# Patient Record
Sex: FEMALE | Race: WHITE | Employment: OTHER | URBAN - METROPOLITAN AREA
[De-identification: names, ages, dates, MRNs, and addresses within clinical notes are randomized per-mention and may not be internally consistent; named-entity substitution may affect disease eponyms.]

---

## 2017-04-07 ENCOUNTER — HOSPITAL ENCOUNTER (EMERGENCY)
Age: 67
Discharge: HOME | End: 2017-04-07
Attending: EMERGENCY MEDICINE
Payer: COMMERCIAL

## 2017-04-07 VITALS
TEMPERATURE: 97.2 F | RESPIRATION RATE: 20 BRPM | WEIGHT: 188 LBS | BODY MASS INDEX: 30.22 KG/M2 | HEART RATE: 92 BPM | HEIGHT: 66 IN | OXYGEN SATURATION: 95 %

## 2017-04-07 DIAGNOSIS — Z76.0 ENCOUNTER FOR MEDICATION REFILL: ICD-10-CM

## 2017-04-07 DIAGNOSIS — F41.1 ANXIETY STATE: Primary | ICD-10-CM

## 2017-04-07 PROCEDURE — 6370000000 HC RX 637 (ALT 250 FOR IP): Performed by: EMERGENCY MEDICINE

## 2017-04-07 PROCEDURE — 99283 EMERGENCY DEPT VISIT LOW MDM: CPT

## 2017-04-07 RX ORDER — LORAZEPAM 1 MG/1
1 TABLET ORAL ONCE
Status: COMPLETED | OUTPATIENT
Start: 2017-04-07 | End: 2017-04-07

## 2017-04-07 RX ORDER — PHENYTOIN SODIUM 100 MG/1
200 CAPSULE, EXTENDED RELEASE ORAL 2 TIMES DAILY
Qty: 60 CAPSULE | Refills: 0 | Status: SHIPPED | OUTPATIENT
Start: 2017-04-07 | End: 2017-08-16

## 2017-04-07 RX ADMIN — LORAZEPAM 1 MG: 1 TABLET ORAL at 20:01

## 2017-04-07 ASSESSMENT — ENCOUNTER SYMPTOMS
RHINORRHEA: 0
BACK PAIN: 0
VOMITING: 0
EYE REDNESS: 0
EYE PAIN: 0
ABDOMINAL PAIN: 0
COUGH: 0
SHORTNESS OF BREATH: 0
NAUSEA: 0

## 2017-04-19 ENCOUNTER — HOSPITAL ENCOUNTER (EMERGENCY)
Age: 67
Discharge: HOME | End: 2017-04-19
Attending: EMERGENCY MEDICINE
Payer: COMMERCIAL

## 2017-04-19 ENCOUNTER — APPOINTMENT (OUTPATIENT)
Dept: CT IMAGING | Age: 67
End: 2017-04-19
Payer: COMMERCIAL

## 2017-04-19 VITALS
HEART RATE: 85 BPM | OXYGEN SATURATION: 97 % | HEIGHT: 66 IN | SYSTOLIC BLOOD PRESSURE: 116 MMHG | RESPIRATION RATE: 18 BRPM | WEIGHT: 185 LBS | DIASTOLIC BLOOD PRESSURE: 65 MMHG | BODY MASS INDEX: 29.73 KG/M2 | TEMPERATURE: 97.7 F

## 2017-04-19 DIAGNOSIS — R19.7 DIARRHEA, UNSPECIFIED TYPE: Primary | ICD-10-CM

## 2017-04-19 LAB
ABSOLUTE EOS #: 0.1 K/UL (ref 0–0.4)
ABSOLUTE LYMPH #: 1.8 K/UL (ref 1–4.8)
ABSOLUTE MONO #: 0.7 K/UL (ref 0.1–1.3)
ALBUMIN SERPL-MCNC: 3.8 G/DL (ref 3.5–5.2)
ALBUMIN/GLOBULIN RATIO: ABNORMAL (ref 1–2.5)
ALP BLD-CCNC: 100 U/L (ref 35–104)
ALT SERPL-CCNC: 12 U/L (ref 5–33)
ANION GAP SERPL CALCULATED.3IONS-SCNC: 12 MMOL/L (ref 9–17)
AST SERPL-CCNC: 17 U/L
BASOPHILS # BLD: 0 % (ref 0–2)
BASOPHILS ABSOLUTE: 0 K/UL (ref 0–0.2)
BILIRUB SERPL-MCNC: 0.15 MG/DL (ref 0.3–1.2)
BUN BLDV-MCNC: 7 MG/DL (ref 8–23)
BUN/CREAT BLD: ABNORMAL (ref 9–20)
CALCIUM SERPL-MCNC: 8.5 MG/DL (ref 8.6–10.4)
CHLORIDE BLD-SCNC: 102 MMOL/L (ref 98–107)
CO2: 25 MMOL/L (ref 20–31)
CREAT SERPL-MCNC: 0.75 MG/DL (ref 0.5–0.9)
DIFFERENTIAL TYPE: ABNORMAL
EOSINOPHILS RELATIVE PERCENT: 2 % (ref 0–4)
GFR AFRICAN AMERICAN: >60 ML/MIN
GFR NON-AFRICAN AMERICAN: >60 ML/MIN
GFR SERPL CREATININE-BSD FRML MDRD: ABNORMAL ML/MIN/{1.73_M2}
GFR SERPL CREATININE-BSD FRML MDRD: ABNORMAL ML/MIN/{1.73_M2}
GLUCOSE BLD-MCNC: 95 MG/DL (ref 70–99)
HCT VFR BLD CALC: 37.4 % (ref 36–46)
HEMOGLOBIN: 12.2 G/DL (ref 12–16)
LACTIC ACID: 1.8 MMOL/L (ref 0.5–2.2)
LIPASE: 29 U/L (ref 13–60)
LYMPHOCYTES # BLD: 18 % (ref 24–44)
MCH RBC QN AUTO: 31.3 PG (ref 26–34)
MCHC RBC AUTO-ENTMCNC: 32.7 G/DL (ref 31–37)
MCV RBC AUTO: 95.7 FL (ref 80–100)
MONOCYTES # BLD: 7 % (ref 1–7)
PDW BLD-RTO: 14.6 % (ref 11.5–14.9)
PLATELET # BLD: 261 K/UL (ref 150–450)
PLATELET ESTIMATE: ABNORMAL
PMV BLD AUTO: 7.2 FL (ref 6–12)
POTASSIUM SERPL-SCNC: 4.1 MMOL/L (ref 3.7–5.3)
RBC # BLD: 3.91 M/UL (ref 4–5.2)
RBC # BLD: ABNORMAL 10*6/UL
SEG NEUTROPHILS: 73 % (ref 36–66)
SEGMENTED NEUTROPHILS ABSOLUTE COUNT: 6.9 K/UL (ref 1.3–9.1)
SODIUM BLD-SCNC: 139 MMOL/L (ref 135–144)
TOTAL PROTEIN: 7 G/DL (ref 6.4–8.3)
WBC # BLD: 9.5 K/UL (ref 3.5–11)
WBC # BLD: ABNORMAL 10*3/UL

## 2017-04-19 PROCEDURE — 99284 EMERGENCY DEPT VISIT MOD MDM: CPT

## 2017-04-19 PROCEDURE — 74177 CT ABD & PELVIS W/CONTRAST: CPT

## 2017-04-19 PROCEDURE — 6360000004 HC RX CONTRAST MEDICATION: Performed by: EMERGENCY MEDICINE

## 2017-04-19 PROCEDURE — 36415 COLL VENOUS BLD VENIPUNCTURE: CPT

## 2017-04-19 PROCEDURE — 80053 COMPREHEN METABOLIC PANEL: CPT

## 2017-04-19 PROCEDURE — 83690 ASSAY OF LIPASE: CPT

## 2017-04-19 PROCEDURE — 83605 ASSAY OF LACTIC ACID: CPT

## 2017-04-19 PROCEDURE — 2580000003 HC RX 258: Performed by: EMERGENCY MEDICINE

## 2017-04-19 PROCEDURE — 85025 COMPLETE CBC W/AUTO DIFF WBC: CPT

## 2017-04-19 RX ORDER — SODIUM CHLORIDE 0.9 % (FLUSH) 0.9 %
10 SYRINGE (ML) INJECTION PRN
Status: DISCONTINUED | OUTPATIENT
Start: 2017-04-19 | End: 2017-04-19 | Stop reason: HOSPADM

## 2017-04-19 RX ORDER — 0.9 % SODIUM CHLORIDE 0.9 %
100 INTRAVENOUS SOLUTION INTRAVENOUS ONCE
Status: COMPLETED | OUTPATIENT
Start: 2017-04-19 | End: 2017-04-19

## 2017-04-19 RX ADMIN — Medication 10 ML: at 19:15

## 2017-04-19 RX ADMIN — SODIUM CHLORIDE 100 ML: 9 INJECTION, SOLUTION INTRAVENOUS at 19:15

## 2017-04-19 RX ADMIN — IOVERSOL 130 ML: 741 INJECTION INTRA-ARTERIAL; INTRAVENOUS at 19:15

## 2017-04-19 ASSESSMENT — ENCOUNTER SYMPTOMS
EYE DISCHARGE: 0
RHINORRHEA: 0
TROUBLE SWALLOWING: 0
COUGH: 0
SORE THROAT: 0
BLOOD IN STOOL: 0
VOMITING: 0
SINUS PRESSURE: 0
BACK PAIN: 0
CONSTIPATION: 0
SHORTNESS OF BREATH: 0
COLOR CHANGE: 0
EYE REDNESS: 0
CHEST TIGHTNESS: 0
DIARRHEA: 1
NAUSEA: 0
WHEEZING: 0
FACIAL SWELLING: 0
ABDOMINAL PAIN: 1
EYE PAIN: 0

## 2017-04-19 ASSESSMENT — PAIN SCALES - GENERAL: PAINLEVEL_OUTOF10: 2

## 2017-04-26 ENCOUNTER — OFFICE VISIT (OUTPATIENT)
Dept: GASTROENTEROLOGY | Age: 67
End: 2017-04-26
Payer: COMMERCIAL

## 2017-04-26 VITALS
RESPIRATION RATE: 14 BRPM | DIASTOLIC BLOOD PRESSURE: 67 MMHG | TEMPERATURE: 97 F | BODY MASS INDEX: 28.45 KG/M2 | HEART RATE: 78 BPM | SYSTOLIC BLOOD PRESSURE: 127 MMHG | OXYGEN SATURATION: 97 % | WEIGHT: 177 LBS | HEIGHT: 66 IN

## 2017-04-26 DIAGNOSIS — K62.3 RECTAL PROLAPSE: ICD-10-CM

## 2017-04-26 DIAGNOSIS — R15.9 FECAL INCONTINENCE: Primary | ICD-10-CM

## 2017-04-26 PROCEDURE — 99204 OFFICE O/P NEW MOD 45 MIN: CPT | Performed by: INTERNAL MEDICINE

## 2017-04-26 RX ORDER — TRAMADOL HYDROCHLORIDE 50 MG/1
50 TABLET ORAL EVERY 6 HOURS PRN
COMMUNITY
End: 2017-08-16

## 2017-04-26 ASSESSMENT — ENCOUNTER SYMPTOMS
ALLERGIC/IMMUNOLOGIC NEGATIVE: 1
ABDOMINAL DISTENTION: 1
VOMITING: 0
NAUSEA: 0
ABDOMINAL PAIN: 1
BLOOD IN STOOL: 1
RECTAL PAIN: 0
EYES NEGATIVE: 1
ANAL BLEEDING: 1
CONSTIPATION: 0
DIARRHEA: 1
RESPIRATORY NEGATIVE: 1

## 2017-05-03 ENCOUNTER — HOSPITAL ENCOUNTER (OUTPATIENT)
Age: 67
Setting detail: OUTPATIENT SURGERY
Discharge: HOME | End: 2017-05-03
Attending: INTERNAL MEDICINE | Admitting: INTERNAL MEDICINE
Payer: COMMERCIAL

## 2017-05-03 VITALS
OXYGEN SATURATION: 98 % | DIASTOLIC BLOOD PRESSURE: 68 MMHG | TEMPERATURE: 97.5 F | BODY MASS INDEX: 28.45 KG/M2 | HEART RATE: 58 BPM | RESPIRATION RATE: 16 BRPM | WEIGHT: 177 LBS | SYSTOLIC BLOOD PRESSURE: 146 MMHG | HEIGHT: 66 IN

## 2017-05-03 PROCEDURE — 2580000003 HC RX 258: Performed by: INTERNAL MEDICINE

## 2017-05-03 PROCEDURE — 6360000002 HC RX W HCPCS: Performed by: INTERNAL MEDICINE

## 2017-05-03 PROCEDURE — 99152 MOD SED SAME PHYS/QHP 5/>YRS: CPT | Performed by: INTERNAL MEDICINE

## 2017-05-03 PROCEDURE — 99153 MOD SED SAME PHYS/QHP EA: CPT | Performed by: INTERNAL MEDICINE

## 2017-05-03 PROCEDURE — 7100000011 HC PHASE II RECOVERY - ADDTL 15 MIN: Performed by: INTERNAL MEDICINE

## 2017-05-03 PROCEDURE — 3609010400 HC COLONOSCOPY POLYPECTOMY HOT BIOPSY: Performed by: INTERNAL MEDICINE

## 2017-05-03 PROCEDURE — 7100000010 HC PHASE II RECOVERY - FIRST 15 MIN: Performed by: INTERNAL MEDICINE

## 2017-05-03 PROCEDURE — 88305 TISSUE EXAM BY PATHOLOGIST: CPT

## 2017-05-03 RX ORDER — SODIUM CHLORIDE, SODIUM LACTATE, POTASSIUM CHLORIDE, CALCIUM CHLORIDE 600; 310; 30; 20 MG/100ML; MG/100ML; MG/100ML; MG/100ML
INJECTION, SOLUTION INTRAVENOUS CONTINUOUS
Status: DISCONTINUED | OUTPATIENT
Start: 2017-05-03 | End: 2017-05-03 | Stop reason: HOSPADM

## 2017-05-03 RX ORDER — MIDAZOLAM HYDROCHLORIDE 1 MG/ML
INJECTION INTRAMUSCULAR; INTRAVENOUS PRN
Status: DISCONTINUED | OUTPATIENT
Start: 2017-05-03 | End: 2017-05-03 | Stop reason: HOSPADM

## 2017-05-03 RX ORDER — MEPERIDINE HYDROCHLORIDE 50 MG/ML
INJECTION INTRAMUSCULAR; INTRAVENOUS; SUBCUTANEOUS PRN
Status: DISCONTINUED | OUTPATIENT
Start: 2017-05-03 | End: 2017-05-03 | Stop reason: HOSPADM

## 2017-05-03 RX ADMIN — SODIUM CHLORIDE, POTASSIUM CHLORIDE, SODIUM LACTATE AND CALCIUM CHLORIDE: 600; 310; 30; 20 INJECTION, SOLUTION INTRAVENOUS at 08:26

## 2017-05-03 ASSESSMENT — PAIN SCALES - GENERAL: PAINLEVEL_OUTOF10: 0

## 2017-05-03 ASSESSMENT — PAIN - FUNCTIONAL ASSESSMENT: PAIN_FUNCTIONAL_ASSESSMENT: 0-10

## 2017-05-04 LAB — SURGICAL PATHOLOGY REPORT: NORMAL

## 2017-05-08 DIAGNOSIS — K62.3 RECTAL PROLAPSE: Primary | ICD-10-CM

## 2017-06-07 ENCOUNTER — HOSPITAL ENCOUNTER (OUTPATIENT)
Age: 67
Setting detail: SPECIMEN
Discharge: HOME | End: 2017-06-07
Payer: COMMERCIAL

## 2017-06-07 ENCOUNTER — HOSPITAL ENCOUNTER (EMERGENCY)
Age: 67
Discharge: HOME | End: 2017-06-07
Attending: EMERGENCY MEDICINE
Payer: COMMERCIAL

## 2017-06-07 VITALS
TEMPERATURE: 98 F | SYSTOLIC BLOOD PRESSURE: 118 MMHG | OXYGEN SATURATION: 98 % | RESPIRATION RATE: 18 BRPM | WEIGHT: 177 LBS | DIASTOLIC BLOOD PRESSURE: 78 MMHG | BODY MASS INDEX: 28.45 KG/M2 | HEART RATE: 70 BPM | HEIGHT: 66 IN

## 2017-06-07 DIAGNOSIS — K62.5 RECTAL BLEED: Primary | ICD-10-CM

## 2017-06-07 LAB
ABSOLUTE EOS #: 0.1 K/UL (ref 0–0.4)
ABSOLUTE LYMPH #: 1.4 K/UL (ref 1–4.8)
ABSOLUTE MONO #: 0.4 K/UL (ref 0.1–1.3)
BASOPHILS # BLD: 0 %
BASOPHILS ABSOLUTE: 0 K/UL (ref 0–0.2)
DIFFERENTIAL TYPE: NORMAL
EOSINOPHILS RELATIVE PERCENT: 2 %
HCT VFR BLD CALC: 39.8 % (ref 36–46)
HEMOGLOBIN: 12.7 G/DL (ref 12–16)
LYMPHOCYTES # BLD: 18 %
MCH RBC QN AUTO: 30.4 PG (ref 26–34)
MCHC RBC AUTO-ENTMCNC: 31.9 G/DL (ref 31–37)
MCV RBC AUTO: 95.4 FL (ref 80–100)
MONOCYTES # BLD: 6 %
PDW BLD-RTO: 13.9 % (ref 11.5–14.9)
PLATELET # BLD: 236 K/UL (ref 150–450)
PLATELET ESTIMATE: NORMAL
PMV BLD AUTO: 7.4 FL (ref 6–12)
RBC # BLD: 4.17 M/UL (ref 4–5.2)
RBC # BLD: NORMAL 10*6/UL
SEG NEUTROPHILS: 74 %
SEGMENTED NEUTROPHILS ABSOLUTE COUNT: 6 K/UL (ref 1.3–9.1)
WBC # BLD: 8 K/UL (ref 3.5–11)
WBC # BLD: NORMAL 10*3/UL

## 2017-06-07 PROCEDURE — 36415 COLL VENOUS BLD VENIPUNCTURE: CPT

## 2017-06-07 PROCEDURE — 99284 EMERGENCY DEPT VISIT MOD MDM: CPT

## 2017-06-07 PROCEDURE — 85025 COMPLETE CBC W/AUTO DIFF WBC: CPT

## 2017-06-07 ASSESSMENT — ENCOUNTER SYMPTOMS
EYE PAIN: 0
EYE REDNESS: 0
SHORTNESS OF BREATH: 0
NAUSEA: 0
ANAL BLEEDING: 1
COUGH: 0
VOMITING: 0
RHINORRHEA: 0
BACK PAIN: 0
ABDOMINAL PAIN: 0

## 2017-06-07 ASSESSMENT — PAIN SCALES - GENERAL: PAINLEVEL_OUTOF10: 8

## 2017-06-07 ASSESSMENT — PAIN DESCRIPTION - FREQUENCY: FREQUENCY: INTERMITTENT

## 2017-06-07 ASSESSMENT — PAIN DESCRIPTION - DESCRIPTORS: DESCRIPTORS: ACHING

## 2017-06-07 ASSESSMENT — PAIN DESCRIPTION - PAIN TYPE: TYPE: ACUTE PAIN

## 2017-06-07 ASSESSMENT — PAIN DESCRIPTION - LOCATION: LOCATION: ABDOMEN

## 2017-07-27 DIAGNOSIS — R15.9 FECAL INCONTINENCE: ICD-10-CM

## 2017-07-27 DIAGNOSIS — K62.3 RECTAL PROLAPSE: ICD-10-CM

## 2017-08-16 ENCOUNTER — OFFICE VISIT (OUTPATIENT)
Dept: GASTROENTEROLOGY | Age: 67
End: 2017-08-16
Payer: COMMERCIAL

## 2017-08-16 VITALS
HEART RATE: 78 BPM | WEIGHT: 182 LBS | BODY MASS INDEX: 29.25 KG/M2 | SYSTOLIC BLOOD PRESSURE: 144 MMHG | TEMPERATURE: 97.1 F | OXYGEN SATURATION: 93 % | DIASTOLIC BLOOD PRESSURE: 64 MMHG | RESPIRATION RATE: 14 BRPM | HEIGHT: 66 IN

## 2017-08-16 DIAGNOSIS — K63.5 HYPERPLASTIC POLYP OF INTESTINE: Primary | ICD-10-CM

## 2017-08-16 DIAGNOSIS — R15.9 INCONTINENCE OF FECES, UNSPECIFIED FECAL INCONTINENCE TYPE: ICD-10-CM

## 2017-08-16 DIAGNOSIS — K62.3 RECTAL PROLAPSE: ICD-10-CM

## 2017-08-16 PROCEDURE — 99214 OFFICE O/P EST MOD 30 MIN: CPT | Performed by: INTERNAL MEDICINE

## 2017-08-16 RX ORDER — LEVETIRACETAM 750 MG/1
750 TABLET ORAL 2 TIMES DAILY
COMMUNITY
End: 2021-07-07

## 2017-08-16 ASSESSMENT — ENCOUNTER SYMPTOMS
ABDOMINAL DISTENTION: 0
RESPIRATORY NEGATIVE: 1
BLOOD IN STOOL: 0
ABDOMINAL PAIN: 0
NAUSEA: 0
DIARRHEA: 0
VOMITING: 0
EYES NEGATIVE: 1
ANAL BLEEDING: 0
GASTROINTESTINAL NEGATIVE: 1
RECTAL PAIN: 0
CONSTIPATION: 0
ALLERGIC/IMMUNOLOGIC NEGATIVE: 1

## 2020-08-04 ENCOUNTER — HOSPITAL ENCOUNTER (EMERGENCY)
Age: 70
Discharge: HOME | End: 2020-08-04
Attending: EMERGENCY MEDICINE
Payer: COMMERCIAL

## 2020-08-04 VITALS
HEART RATE: 82 BPM | BODY MASS INDEX: 33.27 KG/M2 | HEIGHT: 66 IN | TEMPERATURE: 97.8 F | SYSTOLIC BLOOD PRESSURE: 172 MMHG | OXYGEN SATURATION: 98 % | RESPIRATION RATE: 18 BRPM | WEIGHT: 207 LBS | DIASTOLIC BLOOD PRESSURE: 102 MMHG

## 2020-08-04 PROCEDURE — 99283 EMERGENCY DEPT VISIT LOW MDM: CPT

## 2020-08-04 ASSESSMENT — ENCOUNTER SYMPTOMS
COUGH: 0
DIARRHEA: 0
ABDOMINAL PAIN: 0
VOMITING: 0
EYE PAIN: 0
SORE THROAT: 0
NAUSEA: 0
SHORTNESS OF BREATH: 0

## 2020-08-04 NOTE — ED PROVIDER NOTES
[facility]     Emergency Department     Faculty Attestation    I performed a history and physical examination of the patient and discussed management with the resident. I have reviewed and agree with the residents findings including all diagnostic interpretations, and treatment plans as written at the time of my review. Any areas of disagreement are noted on the chart. I was personally present for the key portions of any procedures. I have documented in the chart those procedures where I was not present during the key portions. For Physician Assistant/ Nurse Practitioner cases/documentation I have personally evaluated this patient and have completed at least one if not all key elements of the E/M (history, physical exam, and MDM). Additional findings are as noted. This patient was evaluated in the Emergency Department for symptoms described in the history of present illness. The patient was evaluated in the context of the global COVID-19 pandemic, which necessitated consideration that the patient might be at risk for infection with the SARS-CoV-2 virus that causes COVID-19. Institutional protocols and algorithms that pertain to the evaluation of patients at risk for COVID-19 are in a state of rapid change based on information released by regulatory bodies including the CDC and federal and state organizations. These policies and algorithms were followed during the patient's care in the ED. Primary Care Physician: ANDRE Rodriguez - CNP    History: This is a 68 y.o. female who presents to the Emergency Department with complaint of hypertension. Patient was at her neurologist appointment today when the office noted the patient have an elevated blood pressure. She has no history of previous hypertension. She denies any headache visual disturbances chest pain or shortness of breath.   Patient is asymptomatic with her elevated blood pressure. Physical:   height is 5' 6\" (1.676 m) and weight is 207 lb (93.9 kg). Her oral temperature is 97.8 °F (36.6 °C). Her blood pressure is 172/102 (abnormal) and her pulse is 82. Her respiration is 18 and oxygen saturation is 98%. Awake alert nontoxic-appearing no acute distress moving all extremities. Impression: Asymptomatic hypertension    Plan: I will attempt to discuss with the primary care physician if they wish to initiate any blood pressure medications at that time or await for follow-up to begin hypertensive management. (Please note that portions of this note were completed with a voice recognition program.  Efforts were made to edit the dictations but occasionally words are mis-transcribed.)    Belinda You.  Bran Paris MD, [facility]  Attending Emergency Medicine Physician        Aubrey Kumar MD  08/04/20 [id number]

## 2020-08-04 NOTE — ED NOTES
Pt arrived to ED after being seen by her PCP and reported to have HTN;  Pt lives at home with her sister who takes care of her;  Pt reports hx of seizures; Pt denies hx of HTN;  Pt A&Ox4; RR even/unlabored; NAD noted;   Pt denies pain; but states she has had a stressful day;  Call light within reach; will cont to monitor     Gautam Trejo RN  08/04/20 0175

## 2020-08-04 NOTE — ED PROVIDER NOTES
[facility] ED  Emergency Department Encounter  EmergencyMedicine Resident     Pt Name:Chio Hardin  MRN: [id number]  [place] 1950  Date of evaluation: 8/4/20  PCP:  ANDRE Beckwith CNP    CHIEF COMPLAINT       Chief Complaint   Patient presents with    Hypertension       HISTORY OF PRESENT ILLNESS  (Location/Symptom, Timing/Onset, Context/Setting, Quality, Duration, Modifying Factors, Severity.)      Don Bustos is a 68 y.o. female who presents with concerns for hypertension. Patient has a history of epilepsy and was just at the neurologist office where she had a hypertensive reading of 200/111. The neurologist saw the patient and then instructed the sister to bring her into the emergency department for evaluation and treatment for her uncontrolled hypertension. Patient is not on anything for antihypertensives. The only medication she is on is the Trileptal for her seizures. She reports no headaches. No changes in her vision or hearing. No lightheadedness, dizziness or syncope. No numbness, tingling, weakness. No nausea or vomiting. No chest pain, shortness of breath, cough. No abdominal pain. Normal urination and defecation. She is not on any blood thinners. No history of trauma or fall. Patient is developmentally [place] delayed and does live with sister whom takes care of her and does administer her medications. PAST MEDICAL / SURGICAL / SOCIAL / FAMILY HISTORY      has a past medical history of Bowel obstruction ([address] COPD (chronic obstructive pulmonary disease) ([address] Fecal incontinence, Gallstones, Hyperlipidemia, Hyperplastic polyp of intestine, Hypertension, Psychiatric problem, and Seizures ([address] has a past surgical history that includes Hysterectomy; Colonoscopy (05/03/2017); and pr colsc flx w/removal lesion by hot bx forceps (N/A, 5/3/2017).     Social History     Socioeconomic History    Marital status: Single     Spouse name: Not on file    Number of children: Not on file    Years of education: Not on file    Highest education level: Not on file   Occupational History    Not on file   Social Needs    Financial resource strain: Not on file    Food insecurity     Worry: Not on file     Inability: Not on file    Transportation needs     Medical: Not on file     Non-medical: Not on file   Tobacco Use    Smoking status: Former Smoker     Packs/day: 1.00     Years: 41.00     Pack years: 41.00     Types: Cigarettes    Smokeless tobacco: Never Used    Tobacco comment: smokes cigars   Substance and Sexual Activity    Alcohol use: No    Drug use: No    Sexual activity: Not on file   Lifestyle    Physical activity     Days per week: Not on file     Minutes per session: Not on file    Stress: Not on file   Relationships    Social connections     Talks on phone: Not on file     Gets together: Not on file     Attends Rastafarian service: Not on file     Active member of club or organization: Not on file     Attends meetings of clubs or organizations: Not on file     Relationship status: Not on file    Intimate partner violence     Fear of current or ex partner: Not on file     Emotionally abused: Not on file     Physically abused: Not on file     Forced sexual activity: Not on file   Other Topics Concern    Not on file   Social History Narrative    Not on file       History reviewed. No pertinent family history. Allergies:  Pcn [penicillins]    Home Medications:  Prior to Admission medications    Medication Sig Start Date End Date Taking? Authorizing Provider   levETIRAcetam (KEPPRA) 750 MG tablet Take 750 mg by mouth 2 times daily    Historical Provider, MD       REVIEW OF SYSTEMS    (2-9 systems for level 4, 10 or more for level 5)      Review of Systems   Constitutional: Negative for activity change, appetite change and fever. HENT: Negative for congestion and sore throat. Eyes: Negative for pain and visual disturbance.    Respiratory: Negative for cough and shortness of breath. Cardiovascular: Negative for chest pain and leg swelling. Gastrointestinal: Negative for abdominal pain, diarrhea, nausea and vomiting. Endocrine: Negative for polyphagia and polyuria. Genitourinary: Negative for dysuria and frequency. Musculoskeletal: Negative for arthralgias and myalgias. Skin: Negative for rash and wound. Allergic/Immunologic: Negative for environmental allergies and food allergies. Neurological: Negative for syncope and weakness. Hematological: Negative for adenopathy. Does not bruise/bleed easily. Psychiatric/Behavioral: Negative for confusion. The patient is not nervous/anxious. PHYSICAL EXAM   (up to 7 for level 4, 8 or more for level 5)      INITIAL VITALS:   BP (!) 172/102   Pulse 82   Temp 97.8 °F (36.6 °C) (Oral)   Resp 18   Ht 5' 6\" (1.676 m)   Wt 207 lb (93.9 kg)   SpO2 98%   BMI 33.41 kg/m²     Physical Exam  Constitutional:       General: She is not in acute distress. Appearance: She is well-developed. HENT:      Head: Normocephalic and atraumatic. Eyes:      Conjunctiva/sclera: Conjunctivae normal.      Pupils: Pupils are equal, round, and reactive to light. Neck:      Musculoskeletal: Normal range of motion and neck supple. Cardiovascular:      Rate and Rhythm: Normal rate and regular rhythm. Heart sounds: Normal heart sounds. No murmur. No friction rub. No gallop. Pulmonary:      Effort: Pulmonary effort is normal. No respiratory distress. Breath sounds: Rhonchi (mild) present. No wheezing or rales. Abdominal:      General: Bowel sounds are normal. There is no distension. Palpations: Abdomen is soft. Tenderness: There is no abdominal tenderness. There is no right CVA tenderness, left CVA tenderness, guarding or rebound. Musculoskeletal: Normal range of motion. General: No tenderness. Skin:     General: Skin is warm and dry. Findings: No rash. Neurological:      Mental Status: She is alert and oriented to person, place, and time. GCS: GCS eye subscore is 4. GCS verbal subscore is 5. GCS motor subscore is 6. Cranial Nerves: Cranial nerves are intact. Sensory: Sensation is intact. Motor: Motor function is intact. Comments: Neuro exam intact   Psychiatric:         Behavior: Behavior normal.         Thought Content: Thought content does not include homicidal or suicidal ideation. Thought content does not include homicidal or suicidal plan. Cognition and Memory: Cognition is impaired. Comments: Mentally delayed         DIFFERENTIAL  DIAGNOSIS     PLAN (LABS / IMAGING / EKG):  Orders Placed This Encounter   Procedures    Inpatient consult to Primary Care Provider       MEDICATIONS ORDERED:  No orders of the defined types were placed in this encounter. DIAGNOSTIC RESULTS / EMERGENCY DEPARTMENT COURSE / MDM     LABS:  No results found for this visit on 08/04/20. RADIOLOGY:  None    EKG  None    All EKG's are interpreted by the Emergency Department Physician who either signs or Co-signs this chart in the absence of a cardiologist.    EMERGENCY DEPARTMENT COURSE:  Patient seen and evaluated. She is sitting on the edge of the bed comfortably, in no acute distress. Nontoxic-appearing. Patient is obese, is hypertensive at 172/102. She is in regular rate and rhythm, lungs with minimal rhonchi otherwise clear bilaterally. No reproducible chest pain, no abdominal pain she is neurovascularly intact. Patient is some mentally delayed she has fecal incontinence but does answer questions appropriately. No other findings on examination. No active symptoms secondary to her hypertension, discussed the examination findings and plan with patient and sister at the bedside. No indication for further lab work-up or imaging or medications at this time with no symptoms.   Patient should follow-up with her primary care physician within the next couple days to have her vital signs reassessed and likely be started on a antihypertensive at that time. Both parties voiced understanding and are in agreement with the plan. Stable and ready for discharge home. Did attempt to page the PCP with no response. PROCEDURES:  None    CONSULTS:  IP CONSULT TO PRIMARY CARE PROVIDER    CRITICAL CARE:  None    FINAL IMPRESSION      1. Essential hypertension          DISPOSITION / PLAN     DISPOSITION Decision To Discharge 08/04/2020 04:16:19 PM      PATIENT REFERRED TO:  ANDRE Valentine - CNP  [address] [facility]  [phone]    Call today  To schedule follow-up with your PCP and get started on blood pressure control medication.       DISCHARGE MEDICATIONS:  New Prescriptions    No medications on file       Compa Gutierrez MD  Emergency Medicine Resident    (Please note that portions of thisnote were completed with a voice recognition program.  Efforts were made to edit the dictations but occasionally words are mis-transcribed.)       Compa Gutierrez MD  Resident  08/04/20 0333

## 2021-07-07 ENCOUNTER — HOSPITAL ENCOUNTER (INPATIENT)
Age: 71
LOS: 7 days | Discharge: HOME | DRG: 644 | End: 2021-07-14
Attending: EMERGENCY MEDICINE | Admitting: INTERNAL MEDICINE
Payer: COMMERCIAL

## 2021-07-07 ENCOUNTER — APPOINTMENT (OUTPATIENT)
Dept: CT IMAGING | Age: 71
DRG: 644 | End: 2021-07-07
Payer: COMMERCIAL

## 2021-07-07 DIAGNOSIS — R10.84 GENERALIZED ABDOMINAL PAIN: ICD-10-CM

## 2021-07-07 DIAGNOSIS — F41.9 ANXIETY: ICD-10-CM

## 2021-07-07 DIAGNOSIS — E87.1 HYPONATREMIA: Primary | ICD-10-CM

## 2021-07-07 DIAGNOSIS — E22.2 SIADH (SYNDROME OF INAPPROPRIATE ADH PRODUCTION) (HCC): ICD-10-CM

## 2021-07-07 LAB
ABSOLUTE EOS #: 0 K/UL (ref 0–0.4)
ABSOLUTE IMMATURE GRANULOCYTE: ABNORMAL K/UL (ref 0–0.3)
ABSOLUTE LYMPH #: 1 K/UL (ref 1–4.8)
ABSOLUTE MONO #: 0.6 K/UL (ref 0.1–1.3)
ALBUMIN SERPL-MCNC: 4.4 G/DL (ref 3.5–5.2)
ALBUMIN/GLOBULIN RATIO: ABNORMAL (ref 1–2.5)
ALP BLD-CCNC: 76 U/L (ref 35–104)
ALT SERPL-CCNC: 9 U/L (ref 5–33)
ANION GAP SERPL CALCULATED.3IONS-SCNC: 10 MMOL/L (ref 9–17)
AST SERPL-CCNC: 15 U/L
BASOPHILS # BLD: 1 % (ref 0–2)
BASOPHILS ABSOLUTE: 0.1 K/UL (ref 0–0.2)
BILIRUB SERPL-MCNC: 0.41 MG/DL (ref 0.3–1.2)
BUN BLDV-MCNC: 15 MG/DL (ref 8–23)
BUN/CREAT BLD: ABNORMAL (ref 9–20)
CALCIUM SERPL-MCNC: 9 MG/DL (ref 8.6–10.4)
CHLORIDE BLD-SCNC: 86 MMOL/L (ref 98–107)
CO2: 23 MMOL/L (ref 20–31)
CREAT SERPL-MCNC: 0.78 MG/DL (ref 0.5–0.9)
DIFFERENTIAL TYPE: ABNORMAL
EOSINOPHILS RELATIVE PERCENT: 0 % (ref 0–4)
GFR AFRICAN AMERICAN: >60 ML/MIN
GFR NON-AFRICAN AMERICAN: >60 ML/MIN
GFR SERPL CREATININE-BSD FRML MDRD: ABNORMAL ML/MIN/{1.73_M2}
GFR SERPL CREATININE-BSD FRML MDRD: ABNORMAL ML/MIN/{1.73_M2}
GLUCOSE BLD-MCNC: 107 MG/DL (ref 70–99)
HCT VFR BLD CALC: 37.8 % (ref 36–46)
HEMOGLOBIN: 12.8 G/DL (ref 12–16)
IMMATURE GRANULOCYTES: ABNORMAL %
LIPASE: 20 U/L (ref 13–60)
LYMPHOCYTES # BLD: 12 % (ref 24–44)
MCH RBC QN AUTO: 30.2 PG (ref 26–34)
MCHC RBC AUTO-ENTMCNC: 34 G/DL (ref 31–37)
MCV RBC AUTO: 88.9 FL (ref 80–100)
MONOCYTES # BLD: 7 % (ref 1–7)
NRBC AUTOMATED: ABNORMAL PER 100 WBC
PDW BLD-RTO: 13.2 % (ref 11.5–14.9)
PLATELET # BLD: 258 K/UL (ref 150–450)
PLATELET ESTIMATE: ABNORMAL
PMV BLD AUTO: 6.3 FL (ref 6–12)
POTASSIUM SERPL-SCNC: 4 MMOL/L (ref 3.7–5.3)
RBC # BLD: 4.25 M/UL (ref 4–5.2)
RBC # BLD: ABNORMAL 10*6/UL
SEG NEUTROPHILS: 80 % (ref 36–66)
SEGMENTED NEUTROPHILS ABSOLUTE COUNT: 6.7 K/UL (ref 1.3–9.1)
SODIUM BLD-SCNC: 119 MMOL/L (ref 135–144)
TOTAL PROTEIN: 7.3 G/DL (ref 6.4–8.3)
WBC # BLD: 8.4 K/UL (ref 3.5–11)
WBC # BLD: ABNORMAL 10*3/UL

## 2021-07-07 PROCEDURE — 83935 ASSAY OF URINE OSMOLALITY: CPT

## 2021-07-07 PROCEDURE — 2060000000 HC ICU INTERMEDIATE R&B

## 2021-07-07 PROCEDURE — 99283 EMERGENCY DEPT VISIT LOW MDM: CPT

## 2021-07-07 PROCEDURE — 6360000002 HC RX W HCPCS: Performed by: NURSE PRACTITIONER

## 2021-07-07 PROCEDURE — 2580000003 HC RX 258: Performed by: STUDENT IN AN ORGANIZED HEALTH CARE EDUCATION/TRAINING PROGRAM

## 2021-07-07 PROCEDURE — 83690 ASSAY OF LIPASE: CPT

## 2021-07-07 PROCEDURE — 99223 1ST HOSP IP/OBS HIGH 75: CPT | Performed by: INTERNAL MEDICINE

## 2021-07-07 PROCEDURE — 6370000000 HC RX 637 (ALT 250 FOR IP): Performed by: NURSE PRACTITIONER

## 2021-07-07 PROCEDURE — 2580000003 HC RX 258: Performed by: NURSE PRACTITIONER

## 2021-07-07 PROCEDURE — 6360000002 HC RX W HCPCS: Performed by: EMERGENCY MEDICINE

## 2021-07-07 PROCEDURE — 81001 URINALYSIS AUTO W/SCOPE: CPT

## 2021-07-07 PROCEDURE — 83930 ASSAY OF BLOOD OSMOLALITY: CPT

## 2021-07-07 PROCEDURE — 84295 ASSAY OF SERUM SODIUM: CPT

## 2021-07-07 PROCEDURE — 36415 COLL VENOUS BLD VENIPUNCTURE: CPT

## 2021-07-07 PROCEDURE — 6370000000 HC RX 637 (ALT 250 FOR IP): Performed by: STUDENT IN AN ORGANIZED HEALTH CARE EDUCATION/TRAINING PROGRAM

## 2021-07-07 PROCEDURE — 6360000004 HC RX CONTRAST MEDICATION: Performed by: STUDENT IN AN ORGANIZED HEALTH CARE EDUCATION/TRAINING PROGRAM

## 2021-07-07 PROCEDURE — 2500000003 HC RX 250 WO HCPCS: Performed by: NURSE PRACTITIONER

## 2021-07-07 PROCEDURE — 74177 CT ABD & PELVIS W/CONTRAST: CPT

## 2021-07-07 PROCEDURE — 80053 COMPREHEN METABOLIC PANEL: CPT

## 2021-07-07 PROCEDURE — 85025 COMPLETE CBC W/AUTO DIFF WBC: CPT

## 2021-07-07 RX ORDER — POTASSIUM CHLORIDE 7.45 MG/ML
10 INJECTION INTRAVENOUS PRN
Status: DISCONTINUED | OUTPATIENT
Start: 2021-07-07 | End: 2021-07-14 | Stop reason: HOSPADM

## 2021-07-07 RX ORDER — POTASSIUM CHLORIDE 20 MEQ/1
40 TABLET, EXTENDED RELEASE ORAL PRN
Status: DISCONTINUED | OUTPATIENT
Start: 2021-07-07 | End: 2021-07-14 | Stop reason: HOSPADM

## 2021-07-07 RX ORDER — ACETAMINOPHEN 650 MG/1
650 SUPPOSITORY RECTAL EVERY 6 HOURS PRN
Status: DISCONTINUED | OUTPATIENT
Start: 2021-07-07 | End: 2021-07-14 | Stop reason: HOSPADM

## 2021-07-07 RX ORDER — OXCARBAZEPINE 300 MG/1
300 TABLET, FILM COATED ORAL 2 TIMES DAILY
COMMUNITY

## 2021-07-07 RX ORDER — SODIUM CHLORIDE 0.9 % (FLUSH) 0.9 %
5-40 SYRINGE (ML) INJECTION EVERY 12 HOURS SCHEDULED
Status: DISCONTINUED | OUTPATIENT
Start: 2021-07-07 | End: 2021-07-14 | Stop reason: HOSPADM

## 2021-07-07 RX ORDER — MORPHINE SULFATE 2 MG/ML
2 INJECTION, SOLUTION INTRAMUSCULAR; INTRAVENOUS EVERY 4 HOURS PRN
Status: DISCONTINUED | OUTPATIENT
Start: 2021-07-07 | End: 2021-07-14 | Stop reason: HOSPADM

## 2021-07-07 RX ORDER — ACETAMINOPHEN 325 MG/1
650 TABLET ORAL EVERY 6 HOURS PRN
Status: DISCONTINUED | OUTPATIENT
Start: 2021-07-07 | End: 2021-07-14 | Stop reason: HOSPADM

## 2021-07-07 RX ORDER — 0.9 % SODIUM CHLORIDE 0.9 %
80 INTRAVENOUS SOLUTION INTRAVENOUS ONCE
Status: COMPLETED | OUTPATIENT
Start: 2021-07-07 | End: 2021-07-07

## 2021-07-07 RX ORDER — ACETAMINOPHEN 500 MG
1000 TABLET ORAL ONCE
Status: COMPLETED | OUTPATIENT
Start: 2021-07-07 | End: 2021-07-07

## 2021-07-07 RX ORDER — MAGNESIUM SULFATE 1 G/100ML
1000 INJECTION INTRAVENOUS PRN
Status: DISCONTINUED | OUTPATIENT
Start: 2021-07-07 | End: 2021-07-14 | Stop reason: HOSPADM

## 2021-07-07 RX ORDER — SODIUM CHLORIDE 0.9 % (FLUSH) 0.9 %
10 SYRINGE (ML) INJECTION PRN
Status: DISCONTINUED | OUTPATIENT
Start: 2021-07-07 | End: 2021-07-12

## 2021-07-07 RX ORDER — LIDOCAINE 4 G/G
1 PATCH TOPICAL DAILY
Status: DISCONTINUED | OUTPATIENT
Start: 2021-07-07 | End: 2021-07-07

## 2021-07-07 RX ORDER — SODIUM CHLORIDE 0.9 % (FLUSH) 0.9 %
10 SYRINGE (ML) INJECTION PRN
Status: DISCONTINUED | OUTPATIENT
Start: 2021-07-07 | End: 2021-07-14 | Stop reason: HOSPADM

## 2021-07-07 RX ORDER — CLONIDINE HYDROCHLORIDE 0.1 MG/1
0.1 TABLET ORAL ONCE
Status: COMPLETED | OUTPATIENT
Start: 2021-07-08 | End: 2021-07-07

## 2021-07-07 RX ORDER — SODIUM CHLORIDE 9 MG/ML
25 INJECTION, SOLUTION INTRAVENOUS PRN
Status: DISCONTINUED | OUTPATIENT
Start: 2021-07-07 | End: 2021-07-14 | Stop reason: HOSPADM

## 2021-07-07 RX ORDER — POLYETHYLENE GLYCOL 3350 17 G/17G
17 POWDER, FOR SOLUTION ORAL DAILY PRN
Status: DISCONTINUED | OUTPATIENT
Start: 2021-07-07 | End: 2021-07-14 | Stop reason: HOSPADM

## 2021-07-07 RX ORDER — MORPHINE SULFATE 4 MG/ML
4 INJECTION, SOLUTION INTRAMUSCULAR; INTRAVENOUS EVERY 4 HOURS PRN
Status: DISCONTINUED | OUTPATIENT
Start: 2021-07-07 | End: 2021-07-14 | Stop reason: HOSPADM

## 2021-07-07 RX ORDER — OXCARBAZEPINE 300 MG/1
300 TABLET, FILM COATED ORAL 2 TIMES DAILY
Status: DISCONTINUED | OUTPATIENT
Start: 2021-07-08 | End: 2021-07-14 | Stop reason: HOSPADM

## 2021-07-07 RX ORDER — SODIUM CHLORIDE 9 MG/ML
INJECTION, SOLUTION INTRAVENOUS CONTINUOUS
Status: DISCONTINUED | OUTPATIENT
Start: 2021-07-07 | End: 2021-07-09

## 2021-07-07 RX ORDER — ONDANSETRON 2 MG/ML
4 INJECTION INTRAMUSCULAR; INTRAVENOUS EVERY 6 HOURS PRN
Status: DISCONTINUED | OUTPATIENT
Start: 2021-07-07 | End: 2021-07-14 | Stop reason: HOSPADM

## 2021-07-07 RX ADMIN — SODIUM CHLORIDE: 9 INJECTION, SOLUTION INTRAVENOUS at 23:24

## 2021-07-07 RX ADMIN — SODIUM CHLORIDE 80 ML: 9 INJECTION, SOLUTION INTRAVENOUS at 20:04

## 2021-07-07 RX ADMIN — CLONIDINE HYDROCHLORIDE 0.1 MG: 0.1 TABLET ORAL at 23:34

## 2021-07-07 RX ADMIN — SODIUM CHLORIDE, PRESERVATIVE FREE 10 ML: 5 INJECTION INTRAVENOUS at 20:04

## 2021-07-07 RX ADMIN — FAMOTIDINE 20 MG: 10 INJECTION, SOLUTION INTRAVENOUS at 23:23

## 2021-07-07 RX ADMIN — MORPHINE SULFATE 4 MG: 4 INJECTION, SOLUTION INTRAMUSCULAR; INTRAVENOUS at 23:24

## 2021-07-07 RX ADMIN — ACETAMINOPHEN 1000 MG: 500 TABLET ORAL at 19:07

## 2021-07-07 RX ADMIN — HYDROMORPHONE HYDROCHLORIDE 1 MG: 1 INJECTION, SOLUTION INTRAMUSCULAR; INTRAVENOUS; SUBCUTANEOUS at 21:56

## 2021-07-07 RX ADMIN — ENOXAPARIN SODIUM 40 MG: 40 INJECTION SUBCUTANEOUS at 23:23

## 2021-07-07 RX ADMIN — IOPAMIDOL 75 ML: 755 INJECTION, SOLUTION INTRAVENOUS at 20:03

## 2021-07-07 ASSESSMENT — ENCOUNTER SYMPTOMS
ABDOMINAL PAIN: 1
VOMITING: 0
COUGH: 0
DIARRHEA: 0
BACK PAIN: 0
SHORTNESS OF BREATH: 0
SORE THROAT: 0

## 2021-07-07 ASSESSMENT — PAIN SCALES - GENERAL
PAINLEVEL_OUTOF10: 9
PAINLEVEL_OUTOF10: 10
PAINLEVEL_OUTOF10: 3
PAINLEVEL_OUTOF10: 9

## 2021-07-07 NOTE — ED NOTES
Fidel ROSENBAUM aware of Na of 119. No further orders at this time.      Brianna Valenzuela RN  07/07/21 [id number]

## 2021-07-07 NOTE — ED TRIAGE NOTES
Mode of arrival (squad #, walk in, police, etc) : wheelchair        Chief complaint(s): abd pain        Arrival Note (brief scenario, treatment PTA, etc). : Pt with abdominal pain x1 month. Pt denies NVD fever. Pt has a hx of 2 bowel obstructions. Pt denies chest pain or SOB. Pt denies urinary problems. C= \"Have you ever felt that you should Cut down on your drinking? \"  No  A= \"Have people Annoyed you by criticizing your drinking? \"  No  G= \"Have you ever felt bad or Guilty about your drinking? \"  No  E= \"Have you ever had a drink as an Eye-opener first thing in the morning to steady your nerves or to help a hangover? \"  No      Deferred []      Reason for deferring: N/A    *If yes to two or more: probable alcohol abuse. *

## 2021-07-07 NOTE — ED PROVIDER NOTES
past surgical history that includes Hysterectomy; Colonoscopy (05/03/2017); and pr colsc flx w/removal lesion by hot bx forceps (N/A, 5/3/2017). Social History     Socioeconomic History    Marital status: Single     Spouse name: Not on file    Number of children: Not on file    Years of education: Not on file    Highest education level: Not on file   Occupational History    Not on file   Tobacco Use    Smoking status: Former Smoker     Packs/day: 1.00     Years: 41.00     Pack years: 41.00     Types: Cigarettes    Smokeless tobacco: Never Used    Tobacco comment: smokes cigars   Substance and Sexual Activity    Alcohol use: No    Drug use: No    Sexual activity: Not on file   Other Topics Concern    Not on file   Social History Narrative    Not on file     Social Determinants of Health     Financial Resource Strain:     Difficulty of Paying Living Expenses:    Food Insecurity:     Worried About Running Out of Food in the Last Year:     Ran Out of Food in the Last Year:    Transportation Needs:     Lack of Transportation (Medical):  Lack of Transportation (Non-Medical):    Physical Activity:     Days of Exercise per Week:     Minutes of Exercise per Session:    Stress:     Feeling of Stress :    Social Connections:     Frequency of Communication with Friends and Family:     Frequency of Social Gatherings with Friends and Family:     Attends Denominational Services:     Active Member of Clubs or Organizations:     Attends Club or Organization Meetings:     Marital Status:    Intimate Partner Violence:     Fear of Current or Ex-Partner:     Emotionally Abused:     Physically Abused:     Sexually Abused:        History reviewed. No pertinent family history. Allergies:  Pcn [penicillins]    Home Medications:  Prior to Admission medications    Medication Sig Start Date End Date Taking?  Authorizing Provider   levETIRAcetam (KEPPRA) 750 MG tablet Take 750 mg by mouth 2 times daily Historical Provider, MD       REVIEW OF SYSTEMS    (2-9 systems for level 4, 10 or more for level 5)      Review of Systems   Constitutional: Negative for chills and fever. HENT: Negative for sore throat. Eyes: Negative for visual disturbance. Respiratory: Negative for cough and shortness of breath. Cardiovascular: Negative for chest pain and palpitations. Gastrointestinal: Positive for abdominal pain. Negative for diarrhea and vomiting. Endocrine: Negative for polyuria. Genitourinary: Negative for dysuria and hematuria. Musculoskeletal: Negative for back pain. Skin: Negative for rash. Allergic/Immunologic: Negative for food allergies. Neurological: Negative for light-headedness and headaches. Psychiatric/Behavioral: Negative for confusion. PHYSICAL EXAM   (up to 7 for level 4, 8 or more for level 5)      INITIAL VITALS:   /84   Pulse 70   Temp 97.6 °F (36.4 °C) (Temporal)   Resp 18   Ht 5' 6\" (1.676 m)   Wt 210 lb (95.3 kg)   SpO2 95%   BMI 33.89 kg/m²     Physical Exam  Constitutional:       General: She is not in acute distress. Appearance: She is well-developed. She is obese. HENT:      Head: Normocephalic. Mouth/Throat:      Mouth: Mucous membranes are moist.   Eyes:      Extraocular Movements: Extraocular movements intact. Pupils: Pupils are equal, round, and reactive to light. Cardiovascular:      Rate and Rhythm: Normal rate and regular rhythm. Heart sounds: Normal heart sounds. Pulmonary:      Effort: Pulmonary effort is normal.      Breath sounds: Normal breath sounds. Abdominal:      General: Bowel sounds are normal.      Palpations: Abdomen is soft. Tenderness: There is generalized abdominal tenderness. There is no right CVA tenderness or left CVA tenderness. Skin:     General: Skin is warm. Capillary Refill: Capillary refill takes less than 2 seconds. Neurological:      General: No focal deficit present.       Mental Status: She is alert and oriented to person, place, and time.    Psychiatric:         Mood and Affect: Mood normal.       DIFFERENTIAL  DIAGNOSIS     PLAN (Sharda Woods / Anderson Miller / EKG):  Orders Placed This Encounter   Procedures    CT ABDOMEN PELVIS W IV CONTRAST Additional Contrast? None    CBC Auto Differential    Comprehensive Metabolic Panel w/ Reflex to MG    Lipase    URINALYSIS WITH MICROSCOPIC    Osmolality    OSMOLALITY, URINE    Telemetry monitoring - continuous duration    Inpatient consult to Internal Medicine    Inpatient consult to Internal Medicine    PATIENT STATUS (FROM ED OR OR/PROCEDURAL) Inpatient       MEDICATIONS ORDERED:  Orders Placed This Encounter   Medications    DISCONTD: lidocaine 4 % external patch 1 patch    acetaminophen (TYLENOL) tablet 1,000 mg    iopamidol (ISOVUE-370) 76 % injection 75 mL    sodium chloride flush 0.9 % injection 10 mL    0.9 % sodium chloride bolus    HYDROmorphone (DILAUDID) injection 1 mg       DDX: Abdominal work-up with CBC, CMP, lipase, UA and CT Abdo pelvis to rule out intra-abdominal pathology especially SBO given history, vital signs stable    DIAGNOSTIC RESULTS / EMERGENCY DEPARTMENT COURSE / MDM   LAB RESULTS:  Results for orders placed or performed during the hospital encounter of 07/07/21   CBC Auto Differential   Result Value Ref Range    WBC 8.4 3.5 - 11.0 k/uL    RBC 4.25 4.0 - 5.2 m/uL    Hemoglobin 12.8 12.0 - 16.0 g/dL    Hematocrit 37.8 36 - 46 %    MCV 88.9 80 - 100 fL    MCH 30.2 26 - 34 pg    MCHC 34.0 31 - 37 g/dL    RDW 13.2 11.5 - 14.9 %    Platelets 619 441 - 336 k/uL    MPV 6.3 6.0 - 12.0 fL    NRBC Automated NOT REPORTED per 100 WBC    Differential Type NOT REPORTED     Seg Neutrophils 80 (H) 36 - 66 %    Lymphocytes 12 (L) 24 - 44 %    Monocytes 7 1 - 7 %    Eosinophils % 0 0 - 4 %    Basophils 1 0 - 2 %    Immature Granulocytes NOT REPORTED 0 %    Segs Absolute 6.70 1.3 - 9.1 k/uL    Absolute Lymph # 1.00 1.0 - 4.8 k/uL Absolute Mono # 0.60 0.1 - 1.3 k/uL    Absolute Eos # 0.00 0.0 - 0.4 k/uL    Basophils Absolute 0.10 0.0 - 0.2 k/uL    Absolute Immature Granulocyte NOT REPORTED 0.00 - 0.30 k/uL    WBC Morphology NOT REPORTED     RBC Morphology NOT REPORTED     Platelet Estimate NOT REPORTED    Comprehensive Metabolic Panel w/ Reflex to MG   Result Value Ref Range    Glucose 107 (H) 70 - 99 mg/dL    BUN 15 8 - 23 mg/dL    CREATININE 0.78 0.50 - 0.90 mg/dL    Bun/Cre Ratio NOT REPORTED 9 - 20    Calcium 9.0 8.6 - 10.4 mg/dL    Sodium 119 (LL) 135 - 144 mmol/L    Potassium 4.0 3.7 - 5.3 mmol/L    Chloride 86 (L) 98 - 107 mmol/L    CO2 23 20 - 31 mmol/L    Anion Gap 10 9 - 17 mmol/L    Alkaline Phosphatase 76 35 - 104 U/L    ALT 9 5 - 33 U/L    AST 15 <32 U/L    Total Bilirubin 0.41 0.3 - 1.2 mg/dL    Total Protein 7.3 6.4 - 8.3 g/dL    Albumin 4.4 3.5 - 5.2 g/dL    Albumin/Globulin Ratio NOT REPORTED 1.0 - 2.5    GFR Non-African American >60 >60 mL/min    GFR African American >60 >60 mL/min    GFR Comment          GFR Staging NOT REPORTED    Lipase   Result Value Ref Range    Lipase 20 13 - 60 U/L       IMPRESSION: 66-year-old lady presents to the emergency department with 4-week history of abdominal pain with multiple history of SBO. Physical exam grossly unremarkable, abdomen soft and minimally tender generalized, vital signs stable. CT Abdo pelvis showing no acute pathology. Sodium 119 and was previously normal at baseline. Urine and serum osmolality collected. Patient admitted to internal medicine for further work-up of hyponatremia. RADIOLOGY:  See radiology report    EMERGENCY DEPARTMENT COURSE:  ED Course as of Jul 07 2207 Wed Jul 07, 2021 1932 Sodium(!!): 119 [EM]   2040 CT  No acute finding in the abdomen or pelvis.     Cholelithiasis with porcelain gallbladder. No pericholecystic fluid or fat  stranding to suggest acute cholecystitis.     Interval partial sigmoidectomy.   Few diverticular main proximal to the  sigmoid anastomosis with no evidence of diverticulitis.     Unchanged/remote T12 and L5 compression fractures. [EM]      ED Course User Index  [EM] Emiliano Linn MD      PROCEDURES:  None    CONSULTS:  IP CONSULT TO INTERNAL MEDICINE  IP CONSULT TO INTERNAL MEDICINE  IP CONSULT TO NEPHROLOGY    FINAL IMPRESSION      1. Generalized abdominal pain          DISPOSITION / PLAN     DISPOSITION        PATIENT REFERRED TO:  No follow-up provider specified.     DISCHARGE MEDICATIONS:  New Prescriptions    No medications on file       Emiliano Linn MD  Emergency Medicine Resident    (Please note that portions of thisnote were completed with a voice recognition program.  Efforts were made to edit the dictations but occasionally words are mis-transcribed.)       Emiliano Linn MD  Resident  07/07/21 [id number] no

## 2021-07-08 ENCOUNTER — APPOINTMENT (OUTPATIENT)
Dept: CT IMAGING | Age: 71
DRG: 644 | End: 2021-07-08
Payer: COMMERCIAL

## 2021-07-08 PROBLEM — R10.9 CHRONIC ABDOMINAL PAIN: Status: ACTIVE | Noted: 2021-07-08

## 2021-07-08 PROBLEM — Z87.19 HISTORY OF SMALL BOWEL OBSTRUCTION: Status: ACTIVE | Noted: 2021-07-08

## 2021-07-08 PROBLEM — G89.29 CHRONIC ABDOMINAL PAIN: Status: ACTIVE | Noted: 2021-07-08

## 2021-07-08 LAB
-: ABNORMAL
AMORPHOUS: ABNORMAL
ANION GAP SERPL CALCULATED.3IONS-SCNC: 10 MMOL/L (ref 9–17)
BACTERIA: ABNORMAL
BILIRUBIN URINE: NEGATIVE
BUN BLDV-MCNC: 12 MG/DL (ref 8–23)
BUN/CREAT BLD: ABNORMAL (ref 9–20)
CALCIUM SERPL-MCNC: 8.4 MG/DL (ref 8.6–10.4)
CASTS UA: ABNORMAL /LPF
CHLORIDE BLD-SCNC: 91 MMOL/L (ref 98–107)
CHLORIDE, UR: 29 MMOL/L
CO2: 21 MMOL/L (ref 20–31)
COLOR: YELLOW
COMMENT UA: ABNORMAL
CREAT SERPL-MCNC: 0.64 MG/DL (ref 0.5–0.9)
CRYSTALS, UA: ABNORMAL /HPF
EPITHELIAL CELLS UA: ABNORMAL /HPF
GFR AFRICAN AMERICAN: >60 ML/MIN
GFR NON-AFRICAN AMERICAN: >60 ML/MIN
GFR SERPL CREATININE-BSD FRML MDRD: ABNORMAL ML/MIN/{1.73_M2}
GFR SERPL CREATININE-BSD FRML MDRD: ABNORMAL ML/MIN/{1.73_M2}
GLUCOSE BLD-MCNC: 97 MG/DL (ref 70–99)
GLUCOSE URINE: NEGATIVE
HCT VFR BLD CALC: 34.9 % (ref 36–46)
HEMOGLOBIN: 11.7 G/DL (ref 12–16)
KETONES, URINE: NEGATIVE
LEUKOCYTE ESTERASE, URINE: NEGATIVE
MAGNESIUM: 2.1 MG/DL (ref 1.6–2.6)
MCH RBC QN AUTO: 30.4 PG (ref 26–34)
MCHC RBC AUTO-ENTMCNC: 33.4 G/DL (ref 31–37)
MCV RBC AUTO: 91.1 FL (ref 80–100)
MUCUS: ABNORMAL
MYOGLOBIN: 60 NG/ML (ref 25–58)
MYOGLOBIN: 72 NG/ML (ref 25–58)
NITRITE, URINE: NEGATIVE
NRBC AUTOMATED: ABNORMAL PER 100 WBC
OSMOLALITY URINE: 494 MOSM/KG (ref 80–1300)
OSMOLALITY URINE: 536 MOSM/KG (ref 80–1300)
OTHER OBSERVATIONS UA: ABNORMAL
PDW BLD-RTO: 13.2 % (ref 11.5–14.9)
PH UA: 6.5 (ref 5–8)
PLATELET # BLD: 215 K/UL (ref 150–450)
PMV BLD AUTO: 6.4 FL (ref 6–12)
POTASSIUM SERPL-SCNC: 3.6 MMOL/L (ref 3.7–5.3)
PROTEIN UA: NEGATIVE
RBC # BLD: 3.83 M/UL (ref 4–5.2)
RBC UA: ABNORMAL /HPF
RENAL EPITHELIAL, UA: ABNORMAL /HPF
SERUM OSMOLALITY: 273 MOSM/KG (ref 275–295)
SODIUM BLD-SCNC: 118 MMOL/L (ref 135–144)
SODIUM BLD-SCNC: 121 MMOL/L (ref 135–144)
SODIUM BLD-SCNC: 122 MMOL/L (ref 135–144)
SODIUM BLD-SCNC: 126 MMOL/L (ref 135–144)
SODIUM BLD-SCNC: 127 MMOL/L (ref 135–144)
SODIUM BLD-SCNC: 128 MMOL/L (ref 135–144)
SODIUM,UR: 38 MMOL/L
SPECIFIC GRAVITY UA: 1.05 (ref 1–1.03)
TRICHOMONAS: ABNORMAL
TROPONIN INTERP: ABNORMAL
TROPONIN INTERP: ABNORMAL
TROPONIN T: ABNORMAL NG/ML
TROPONIN T: ABNORMAL NG/ML
TROPONIN, HIGH SENSITIVITY: 23 NG/L (ref 0–14)
TROPONIN, HIGH SENSITIVITY: 23 NG/L (ref 0–14)
TSH SERPL DL<=0.05 MIU/L-ACNC: 1.02 MIU/L (ref 0.3–5)
TURBIDITY: CLEAR
URIC ACID: 5 MG/DL (ref 2.4–5.7)
URINE HGB: NEGATIVE
UROBILINOGEN, URINE: NORMAL
WBC # BLD: 5.7 K/UL (ref 3.5–11)
WBC UA: ABNORMAL /HPF
YEAST: ABNORMAL

## 2021-07-08 PROCEDURE — 83935 ASSAY OF URINE OSMOLALITY: CPT

## 2021-07-08 PROCEDURE — 2580000003 HC RX 258: Performed by: NURSE PRACTITIONER

## 2021-07-08 PROCEDURE — 84443 ASSAY THYROID STIM HORMONE: CPT

## 2021-07-08 PROCEDURE — 2580000003 HC RX 258: Performed by: INTERNAL MEDICINE

## 2021-07-08 PROCEDURE — 84484 ASSAY OF TROPONIN QUANT: CPT

## 2021-07-08 PROCEDURE — 2500000003 HC RX 250 WO HCPCS: Performed by: NURSE PRACTITIONER

## 2021-07-08 PROCEDURE — 36415 COLL VENOUS BLD VENIPUNCTURE: CPT

## 2021-07-08 PROCEDURE — 71260 CT THORAX DX C+: CPT

## 2021-07-08 PROCEDURE — 84300 ASSAY OF URINE SODIUM: CPT

## 2021-07-08 PROCEDURE — 6370000000 HC RX 637 (ALT 250 FOR IP): Performed by: NURSE PRACTITIONER

## 2021-07-08 PROCEDURE — 6360000004 HC RX CONTRAST MEDICATION: Performed by: INTERNAL MEDICINE

## 2021-07-08 PROCEDURE — 80048 BASIC METABOLIC PNL TOTAL CA: CPT

## 2021-07-08 PROCEDURE — 83874 ASSAY OF MYOGLOBIN: CPT

## 2021-07-08 PROCEDURE — 85027 COMPLETE CBC AUTOMATED: CPT

## 2021-07-08 PROCEDURE — 82436 ASSAY OF URINE CHLORIDE: CPT

## 2021-07-08 PROCEDURE — 84550 ASSAY OF BLOOD/URIC ACID: CPT

## 2021-07-08 PROCEDURE — 2060000000 HC ICU INTERMEDIATE R&B

## 2021-07-08 PROCEDURE — 84295 ASSAY OF SERUM SODIUM: CPT

## 2021-07-08 PROCEDURE — 83735 ASSAY OF MAGNESIUM: CPT

## 2021-07-08 PROCEDURE — 93005 ELECTROCARDIOGRAM TRACING: CPT | Performed by: INTERNAL MEDICINE

## 2021-07-08 PROCEDURE — 6360000002 HC RX W HCPCS: Performed by: NURSE PRACTITIONER

## 2021-07-08 RX ORDER — SODIUM CHLORIDE 0.9 % (FLUSH) 0.9 %
10 SYRINGE (ML) INJECTION PRN
Status: DISCONTINUED | OUTPATIENT
Start: 2021-07-08 | End: 2021-07-12

## 2021-07-08 RX ORDER — 0.9 % SODIUM CHLORIDE 0.9 %
80 INTRAVENOUS SOLUTION INTRAVENOUS ONCE
Status: COMPLETED | OUTPATIENT
Start: 2021-07-08 | End: 2021-07-08

## 2021-07-08 RX ADMIN — MORPHINE SULFATE 4 MG: 4 INJECTION, SOLUTION INTRAMUSCULAR; INTRAVENOUS at 20:19

## 2021-07-08 RX ADMIN — SODIUM CHLORIDE, PRESERVATIVE FREE 10 ML: 5 INJECTION INTRAVENOUS at 09:06

## 2021-07-08 RX ADMIN — ENOXAPARIN SODIUM 40 MG: 40 INJECTION SUBCUTANEOUS at 09:06

## 2021-07-08 RX ADMIN — MORPHINE SULFATE 4 MG: 4 INJECTION, SOLUTION INTRAMUSCULAR; INTRAVENOUS at 11:36

## 2021-07-08 RX ADMIN — MORPHINE SULFATE 2 MG: 2 INJECTION, SOLUTION INTRAMUSCULAR; INTRAVENOUS at 15:51

## 2021-07-08 RX ADMIN — SODIUM CHLORIDE: 9 INJECTION, SOLUTION INTRAVENOUS at 14:25

## 2021-07-08 RX ADMIN — SODIUM CHLORIDE 80 ML: 9 INJECTION, SOLUTION INTRAVENOUS at 11:19

## 2021-07-08 RX ADMIN — FAMOTIDINE 20 MG: 10 INJECTION, SOLUTION INTRAVENOUS at 20:18

## 2021-07-08 RX ADMIN — OXCARBAZEPINE 300 MG: 300 TABLET, FILM COATED ORAL at 20:19

## 2021-07-08 RX ADMIN — SODIUM CHLORIDE, PRESERVATIVE FREE 10 ML: 5 INJECTION INTRAVENOUS at 11:19

## 2021-07-08 RX ADMIN — FAMOTIDINE 20 MG: 10 INJECTION, SOLUTION INTRAVENOUS at 09:06

## 2021-07-08 RX ADMIN — IOVERSOL 75 ML: 741 INJECTION INTRA-ARTERIAL; INTRAVENOUS at 11:19

## 2021-07-08 ASSESSMENT — ENCOUNTER SYMPTOMS
RHINORRHEA: 0
SHORTNESS OF BREATH: 0
WHEEZING: 0
NAUSEA: 1
DIARRHEA: 0
TROUBLE SWALLOWING: 0
VOMITING: 0
ABDOMINAL DISTENTION: 0
COUGH: 0
ALLERGIC/IMMUNOLOGIC NEGATIVE: 1
COLOR CHANGE: 0
BACK PAIN: 0
ABDOMINAL PAIN: 1
PHOTOPHOBIA: 0
CONSTIPATION: 0

## 2021-07-08 ASSESSMENT — PAIN SCALES - GENERAL
PAINLEVEL_OUTOF10: 8
PAINLEVEL_OUTOF10: 9
PAINLEVEL_OUTOF10: 5
PAINLEVEL_OUTOF10: 4
PAINLEVEL_OUTOF10: 4
PAINLEVEL_OUTOF10: 9
PAINLEVEL_OUTOF10: 5
PAINLEVEL_OUTOF10: 9
PAINLEVEL_OUTOF10: 4
PAINLEVEL_OUTOF10: 9
PAINLEVEL_OUTOF10: 5

## 2021-07-08 NOTE — DISCHARGE INSTR - COC
Continuity of Care Form    Patient Name: Jefferson Dunbar   :  1950  MRN:  [id number]    Admit date:  2021  Discharge date:  ***    Code Status Order: Prior   Advance Directives:      Admitting Physician:  No admitting provider for patient encounter. PCP: ANDRE Gerard - CNP    Discharging Nurse: [facility] Unit/Room#:   Discharging Unit Phone Number: ***    Emergency Contact:   Extended Emergency Contact Information  Primary Emergency Contact: Francine Rueda   [address] Phone: [phone]  Relation: Brother/Sister  Secondary Emergency Contact: Kelechi Mccurdy [address] Phone: [phone]  Relation: Legal Guardian    Past Surgical History:  Past Surgical History:   Procedure Laterality Date    COLONOSCOPY  2017    HYPERPLASTIC POLYP    HYSTERECTOMY      MA COLSC FLX W/REMOVAL LESION BY HOT BX FORCEPS N/A 5/3/2017    COLONOSCOPY POLYPECTOMY HOT BIOPSY, HOT SNARE POLYPECTOMY performed by Marcelino Jj MD at [address]        Immunization History: There is no immunization history on file for this patient. Active Problems:  Patient Active Problem List   Diagnosis Code    History of gallstones Z87.19    COPD (chronic obstructive pulmonary disease) ([facility] [address] J44.9    Peptic ulcer K27.9    Seizures (HCC) R56.9    HTN (hypertension) I10    Gall bladder stones K80.20    URI (upper respiratory infection) J06.9    Gall stones K80.20    Fall due to wet surface W01. 0XXA    Fecal incontinence R15.9    Rectal prolapse K62.3    Hyperplastic polyp of intestine K63.5    Incontinence of feces R15.9    Hyponatremia E87.1       Isolation/Infection:   Isolation            No Isolation          Patient Infection Status       Infection Onset Added Last Indicated Last Indicated By Review Planned Expiration Resolved Resolved By    MDRO (multi-drug resistant organism)  06/24/15 06/24/15 Jett Dave RN        E.  Coli - urine 2016              Nurse Assessment:  Last Vital Signs: /84   Pulse 70   Temp 97.6 °F (36.4 °C) (Temporal)   Resp 18   Ht 5' 6\" (1.676 m)   Wt 210 lb (95.3 kg)   SpO2 95%   BMI 33.89 kg/m²     Last documented pain score (0-10 scale): Pain Level: 9  Last Weight:   Wt Readings from Last 1 Encounters:   21 210 lb (95.3 kg)     Mental Status:  {IP PT MENTAL STATUS:11976}    IV Access:  { GEOVANNI IV ACCESS:806637073}    Nursing Mobility/ADLs:  Walking   {P DME NEOX:767522190}  Transfer  {P DME UBCV:526996563}  Bathing  {P DME BZXT:459447611}  Dressing  {CHP DME GKCZ:901339673}  Toileting  {CHP DME TBXF:405645987}  Feeding  {P DME IKHJ:535040411}  Med Admin  {[facility] DME EPEJ:595133400}  Med Delivery   { GEOVANNI MED Delivery:153491520}    Wound Care Documentation and Therapy:        Elimination:  Continence: Bowel: {YES / AK:56788}  Bladder: {YES / JT:58675}  Urinary Catheter: {Urinary Catheter:816942994}   Colostomy/Ileostomy/Ileal Conduit: {YES / VD:99579}       Date of Last BM: ***  No intake or output data in the 24 hours ending 21 2204  No intake/output data recorded.     Safety Concerns:     [employer] Safety Concerns:164023496}    Impairments/Disabilities:      [employer] Impairments/Disabilities:248792743}    Nutrition Therapy:  Current Nutrition Therapy:   [employer] Diet List:672516287}    Routes of Feeding: {P DME Other Feedings:057472251}  Liquids: {Slp liquid thickness:32100}  Daily Fluid Restriction: {CHP DME Yes amt example:347439161}  Last Modified Barium Swallow with Video (Video Swallowing Test): {Done Not Done UNQ}    Treatments at the Time of Hospital Discharge:   Respiratory Treatments: ***  Oxygen Therapy:  {Therapy; copd oxygen:12136}  Ventilator:    { CC Vent GAZI:652052406}    Rehab Therapies: {THERAPEUTIC INTERVENTION:2819137506}  Weight Bearing Status/Restrictions: [address] [facility] Weight [facility]}  Other Medical Equipment (for information only, NOT a DME order): {EQUIPMENT:782358619}  Other Treatments: ***    Patient's personal belongings (please select all that are sent with patient):  {CHP DME Belongings:744746843}    RN SIGNATURE:  {Esignature:437115379}    CASE MANAGEMENT/SOCIAL WORK SECTION    Inpatient Status Date: ***    Readmission Risk Assessment Score:  Readmission Risk              Risk of Unplanned Readmission:  0           Discharging to Facility/ Agency   Name:   Address:  Phone:  Fax:    Dialysis Facility (if applicable)   Name:  Address:  Dialysis Schedule:  Phone:  Fax:    / signature: {Esignature:171008762}    PHYSICIAN SECTION    Prognosis: {Prognosis:9568297273}    Condition at Discharge: [facility] Patient Condition:586780883}    Rehab Potential (if transferring to Rehab): {Prognosis:8508347906}    Recommended Labs or Other Treatments After Discharge: ***    Physician Certification: I certify the above information and transfer of Dez Mayo  is necessary for the continuing treatment of the diagnosis listed and that she requires {Admit to Appropriate Level of Care:49938} for {GREATER/LESS:243225771} 30 days.      Update Admission H&P: {CHP DME Changes in SUMJY:336087634}    PHYSICIAN SIGNATURE:  Electronically signed by Sindy Torres MD on 7/14/21 at 10:55 AM EDT

## 2021-07-08 NOTE — CONSULTS
Department of Internal Medicine  Nephrology Rick Wheatley MD   Consult Note      SUBJECTIVE: This is a 65 y.o. female with a significant past medical history of Bipolar disorder, hyperlipidemia, chronic obstructive pulmonary disease and systemic hypertension, who presented to the emergency department yesterday with complaints of Acute on chronic abdominal pain of 4 weeks duration. There was no associated fever, chills, nausea, vomiting, constipation or diarrhea. She did not have any seizures. Laboratory studies were remarkable for serum sodium 119 mmol/L and hence nephrology consultation. Home medications Trileptal and Keppra. She is currently receiving IV fluid 0.9 normal saline at 75 mL/h. Pcn [penicillins]    Past Medical History:   Diagnosis Date    Bowel obstruction ([facility] [address]     ?  COPD (chronic obstructive pulmonary disease) (HCC)     Fecal incontinence     Gallstones     Hyperlipidemia     Hyperplastic polyp of intestine     Hypertension     Psychiatric problem     depression    Seizures (HCC)      Scheduled Meds:   sodium chloride flush  5-40 mL Intravenous 2 times per day    enoxaparin  40 mg Subcutaneous Daily    famotidine (PEPCID) injection  20 mg Intravenous BID    OXcarbazepine  300 mg Oral BID     Continuous Infusions:   sodium chloride      sodium chloride 75 mL/hr at 07/07/21 2324     PRN Meds:.sodium chloride flush, sodium chloride flush, sodium chloride, potassium chloride **OR** potassium alternative oral replacement **OR** potassium chloride, magnesium sulfate, polyethylene glycol, acetaminophen **OR** acetaminophen, ondansetron, morphine **OR** morphine    History reviewed. No pertinent family history.      Social History     Socioeconomic History    Marital status: Single     Spouse name: None    Number of children: None    Years of education: None    Highest education level: None   Occupational History    None   Tobacco Use    Smoking status: Former Smoker Packs/day: 1.00     Years: 41.00     Pack years: 41.00     Types: Cigarettes    Smokeless tobacco: Never Used    Tobacco comment: smokes cigars   Substance and Sexual Activity    Alcohol use: No    Drug use: No    Sexual activity: None   Other Topics Concern    None   Social History Narrative    None     Social Determinants of Health     Financial Resource Strain:     Difficulty of Paying Living Expenses:    Food Insecurity:     Worried About Running Out of Food in the Last Year:     Ran Out of Food in the Last Year:    Transportation Needs:     Lack of Transportation (Medical):  Lack of Transportation (Non-Medical):    Physical Activity:     Days of Exercise per Week:     Minutes of Exercise per Session:    Stress:     Feeling of Stress :    Social Connections:     Frequency of Communication with Friends and Family:     Frequency of Social Gatherings with Friends and Family:     Attends [employer] Services:     Active Member of Clubs or Organizations:     Attends Club or Organization Meetings:     Marital Status:    Intimate Partner Violence:     Fear of Current or Ex-Partner:     Emotionally Abused:     Physically Abused:     Sexually Abused:      Review of systems: CNS - no headache or dizziness; Cardiac - no chest pain; Respiratory - no shortness of breath; Gastrointestinal - Abdominal pain but no nausea, vomiting or diarrhea; Musculoskeletal - general body aches; Skin/Integument - no rashes. Physical Exam:    VITALS:  BP (!) 175/75   Pulse 51   Temp 97.4 °F (36.3 °C) (Oral)   Resp 16   Ht 5' 6\" (1.676 m)   Wt 218 lb 4.1 oz (99 kg)   SpO2 98%   BMI 35.23 kg/m²   24HR INTAKE/OUTPUT:    Intake/Output Summary (Last 24 hours) at 7/8/2021 1030  Last data filed at 7/8/2021 1001  Gross per 24 hour   Intake --   Output 300 ml   Net -300 ml     Constitutional: alert, appears stated age and cooperative    Skin: No rash but evidence of hirsutism.     Head: Normocephalic, without obvious abnormality, atraumatic     Cardiovascular/Edema: regular rate and rhythm, S1, S2 normal, no murmur, click, rub or gallop    Respiratory: Lungs: clear to auscultation bilaterally    Abdomen: soft, non-tender; bowel sounds normal; no masses,  no organomegaly    Back: symmetric, no curvature. ROM normal. No CVA tenderness. Extremities: extremities normal, atraumatic, no cyanosis or edema    Neuro:  Involuntary movements especially of the muscles of mastication consistent with tardive dyskinesia    CBC:   Recent Labs     07/07/21 1900 07/08/21  0520   WBC 8.4 5.7   HGB 12.8 11.7*    215     BMP:    Recent Labs     07/07/21  1900 07/08/21  0006 07/08/21  0520   * 118* 122*   K 4.0  --  3.6*   CL 86*  --  91*   CO2 23  --  21   BUN 15  --  12   CREATININE 0.78  --  0.64   GLUCOSE 107*  --  97     Lab Results   Component Value Date    NITRU NEGATIVE 07/07/2021    COLORU YELLOW 07/07/2021    PHUR 6.5 07/07/2021    WBCUA 0 TO 2 07/07/2021    RBCUA 0 TO 2 07/07/2021    MUCUS NOT REPORTED 07/07/2021    TRICHOMONAS NOT REPORTED 07/07/2021    YEAST NOT REPORTED 07/07/2021    BACTERIA FEW 07/07/2021    SPECGRAV 1.049 07/07/2021    LEUKOCYTESUR NEGATIVE 07/07/2021    UROBILINOGEN Normal 07/07/2021    BILIRUBINUR NEGATIVE 07/07/2021    BILIRUBINUR NEGATIVE 05/04/2012    GLUCOSEU NEGATIVE 07/07/2021    GLUCOSEU NEGATIVE 05/04/2012    KETUA NEGATIVE 07/07/2021    AMORPHOUS NOT REPORTED 07/07/2021     IMPRESSION/RECOMMENDATIONS:      1. Acute severe hyponatremia - Patient is clinically euvolemic and hyponatremia in this patient is consistent with SIADH. This may be secondary to Oxcarbazepine[Trileptal]. Plan: Continue IV fluid 0.9 normal saline at 75 mL/h. Check serum and urine osmolality. TSH and uric acid level. Okay to continue Trileptal for now. Random urine sodium and chloride.   Check serum sodium level every 4 hours with target rate of correction of sodium not to exceed 0.5 mmol/L/h [maximum 0.8 mmol in the first 24 hours]. 2.  Systemic hypertension - blood pressure control is slightly suboptimal.  Spironolactone 25 mg p.o. daily. Hydralazine 25 mg p.o. twice daily. 3.  Hypokalemia - may be secondary to poor intake. Correction of hypokalemia will help correction of hyponatremia. Prognosis is guarded. Thank you very much for the courtesy of this consultation.     Wil Grady MD FACP  Attending Nephrologist  7/8/2021 10:27 AM

## 2021-07-08 NOTE — FLOWSHEET NOTE
07/08/21 0047   Provider Notification   Reason for Communication Review case   Provider Name Dr. Trudy Mohan   Provider Notification Physician   Method of Communication Call   Response See orders   Notification Time [phone]   RN notified Dr. Trudy Mohan regarding patient sodium going from 119 to 118. Physician ordered normal saline going 75 and Q6 draws for sodium. Physician wants notified if sodium goes above 125. See new orders.

## 2021-07-08 NOTE — PLAN OF CARE
Problem: Safety:  Goal: Free from accidental physical injury  Description: Free from accidental physical injury  Outcome: Ongoing  Note: Patient free from accidental physical injury throughout this shift     Problem: Pain:  Goal: Patient's pain/discomfort is manageable  Description: Patient's pain/discomfort is manageable  Outcome: Ongoing  Note: Patient given pain medication as ordered. Problem: Skin Integrity:  Goal: Skin integrity will stabilize  Description: Skin integrity will stabilize  Outcome: Ongoing  Note: No new alterations in skin integrity.

## 2021-07-08 NOTE — H&P
[facility]     HISTORY AND PHYSICAL EXAMINATION            Date:   7/8/2021  Patientname:  Jefferson Dunbar  Date of admission:  7/7/2021  6:15 PM  MRN:   [id number]  Account:  [de-identified]  YOB: 1950  PCP:    ANDRE Gerard CNP  Room:   2096/2096-01  Code Status:    Full Code    CHIEF COMPLAINT     Chief Complaint   Patient presents with    Abdominal Pain     x1 month       HISTORY OF PRESENT ILLNESS  (Character, Onset, Location, Duration,  Exacerbating/RelievingFactors, Radiation,   Associated Symptoms, Severity )      The patient is a 65 y.o.  female, with a history of small bowel obstruction, COPD, gallstones, hyperlipidemia, hypertension, and seizures. Patient presents with abdominal pain and nausea. Patient states last bowel movement was yesterday. She has been able to eat. She denies chest pain, cough, shortness of breath, dysuria, fever or chills. HPI   1) Location/Symptom generalized abdominal pain, nausea  2) Timing/Onset: Ongoing for 4 weeks, worsened past several days  3) Context/Setting: History of SBO  4) Quality: Sharp  5) Duration: continuous   6) Modifying Factors: No aggravating or alleviating factors  7) Severity: moderate     PAST MEDICAL HISTORY   Patient  has a past medical history of Bowel obstruction ([address] COPD (chronic obstructive pulmonary disease) ([address] Fecal incontinence, Gallstones, Hyperlipidemia, Hyperplastic polyp of intestine, Hypertension, Psychiatric problem, and Seizures ([address] PAST SURGICAL HISTORY    Patient  has a past surgical history that includes Hysterectomy; Colonoscopy (05/03/2017); and pr colsc flx w/removal lesion by hot bx forceps (N/A, 5/3/2017). FAMILY HISTORY    Patient family history is not on file. SOCIAL HISTORY    Patient  reports that she has quit smoking. Her smoking use included cigarettes. She has a 41.00 pack-year smoking history.  She has never used smokeless tobacco. She reports that she does not drink alcohol and does not use drugs. HOME MEDICATIONS        Prior to Admission medications    Medication Sig Start Date End Date Taking? Authorizing Provider   OXcarbazepine (TRILEPTAL) 300 MG tablet Take 300 mg by mouth 2 times daily   Yes Historical Provider, MD       ALLERGIES      Pcn [penicillins]    REVIEW OF SYSTEMS     Review of Systems   Constitutional: Positive for appetite change. Negative for activity change, chills, fatigue and fever. HENT: Negative for congestion, rhinorrhea and trouble swallowing. Eyes: Negative for photophobia and visual disturbance. Respiratory: Negative for cough, shortness of breath and wheezing. Cardiovascular: Negative for chest pain and leg swelling. Gastrointestinal: Positive for abdominal pain and nausea. Negative for abdominal distention, constipation, diarrhea and vomiting. Endocrine: Negative for polydipsia, polyphagia and polyuria. Genitourinary: Negative for difficulty urinating, dysuria, flank pain and hematuria. Musculoskeletal: Positive for gait problem. Negative for arthralgias, back pain and myalgias. Skin: Negative for color change and pallor. Allergic/Immunologic: Negative. Neurological: Negative for dizziness, weakness, light-headedness and headaches. Hematological: Negative. Psychiatric/Behavioral: Negative for agitation, behavioral problems, confusion and hallucinations. The patient is not nervous/anxious. PHYSICAL EXAM      BP (!) 175/66   Pulse 65   Temp 97.4 °F (36.3 °C) (Oral)   Resp 16   Ht 5' 6\" (1.676 m)   Wt 210 lb (95.3 kg)   SpO2 96%   BMI 33.89 kg/m²  Body mass index is 33.89 kg/m². Physical Exam  Constitutional:       General: She is not in acute distress. Appearance: Normal appearance. She is well-developed and well-groomed. She is obese. HENT:      Head: Normocephalic.       Right Ear: External ear normal.      Left Ear: External ear normal.      Nose: Nose normal. Mouth/Throat:      Mouth: Mucous membranes are moist.   Eyes:      Extraocular Movements: Extraocular movements intact. Conjunctiva/sclera: Conjunctivae normal.      Pupils: Pupils are equal, round, and reactive to light. Cardiovascular:      Rate and Rhythm: Normal rate and regular rhythm. Pulses: Normal pulses. Heart sounds: Normal heart sounds. Pulmonary:      Effort: Pulmonary effort is normal.      Breath sounds: Normal breath sounds. Abdominal:      General: Bowel sounds are normal.      Palpations: Abdomen is soft. Tenderness: There is no abdominal tenderness. Comments: Abdomen is soft and nontender to palpation. Bowel sounds active. Musculoskeletal:         General: Normal range of motion. Cervical back: Normal range of motion and neck supple. Right lower leg: No edema. Left lower leg: No edema. Skin:     General: Skin is warm and dry. Capillary Refill: Capillary refill takes less than 2 seconds. Neurological:      Mental Status: She is alert and oriented to person, place, and time. Psychiatric:         Mood and Affect: Mood normal.         Behavior: Behavior normal.         Thought Content: Thought content normal.         Judgment: Judgment normal.       DIAGNOSTICS      EKG:    Labs:  CBC:   Recent Labs     07/07/21  1900   WBC 8.4   HGB 12.8        BMP:    Recent Labs     07/07/21  1900 07/08/21  0006   * 118*   K 4.0  --    CL 86*  --    CO2 23  --    BUN 15  --    CREATININE 0.78  --    GLUCOSE 107*  --      S. Calcium:  Recent Labs     07/07/21  1900   CALCIUM 9.0     S. Ionized Calcium:No results for input(s): IONCA in the last 72 hours. S. Magnesium:No results for input(s): MG in the last 72 hours. S. Phosphorus:No results for input(s): PHOS in the last 72 hours. S. Glucose:No results for input(s): POCGLU in the last 72 hours.   Glycosylated hemoglobin A1C:   Lab Results   Component Value Date    LABA1C 4.8 10/16/2015 Hepatic:   Recent Labs     07/07/21  1900   AST 15   ALT 9   ALKPHOS 76     CARDIAC ENZY: No results for input(s): CKTOTAL, CKMB, CKMBINDEX, TROPHS, MYOGLOBIN in the last 72 hours. INR: No results for input(s): INR in the last 72 hours. BNP: No results for input(s): PROBNP in the last 72 hours. ABGs: No results for input(s): PH, PCO2, PO2, HCO3, O2SAT in the last 72 hours. Lipids: No results for input(s): CHOL, TRIG, HDL, LDLCALC in the last 72 hours. Invalid input(s): LDL  Pancreatic functions:  Recent Labs     07/07/21  1900   LIPASE 20     S. LacticAcid: No results for input(s): LACTA in the last 72 hours. Thyroid functions:   Lab Results   Component Value Date    TSH 2.05 10/31/2013      U/A:No results for input(s): NITRITE, COLORU, WBCUA, RBCUA, MUCUS, BACTERIA, CLARITYU, SPECGRAV, LEUKOCYTESUR, BLOODU, GLUCOSEU, AMORPHOUS in the last 72 hours. Invalid input(s): Supriya Martinez    Imaging/Diagonstics:     CT ABDOMEN PELVIS W IV CONTRAST Additional Contrast? None    Result Date: 7/7/2021  EXAMINATION: CT OF THE ABDOMEN AND PELVIS WITH CONTRAST 7/7/2021 7:51 pm TECHNIQUE: CT of the abdomen and pelvis was performed with the administration of intravenous contrast. Multiplanar reformatted images are provided for review. Dose modulation, iterative reconstruction, and/or weight based adjustment of the mA/kV was utilized to reduce the radiation dose to as low as reasonably achievable. COMPARISON: 04/19/2017 HISTORY: ORDERING SYSTEM PROVIDED HISTORY: Abdo pain, hx SBO TECHNOLOGIST PROVIDED HISTORY: Abdo pain, hx SBO Decision Support Exception - unselect if not a suspected or confirmed emergency medical condition->Emergency Medical Condition (MA) Reason for Exam: pt c/o abd pain x 1 month, pt unable to raise arms above head for CT exam. FINDINGS: Lower Chest: Heart size is normal.  Three-vessel calcific coronary artery disease. Small hiatal hernia. Lung bases are clear.  Organs: Cholelithiasis with porcelain gallbladder. No pericholecystic fluid fat stranding. No change from prior study. The liver, spleen, pancreas and kidneys are normal.  Small nodular contour of the bilateral adrenal glands, unchanged. GI/Bowel: Interval partial sigmoidectomy. A few diverticula remain proximal to the sigmoid anastomosis. No evidence of diverticulitis. The appendix is normal.  No bowel obstruction. Pelvis: Urinary bladder is unremarkable. The uterus is surgically absent. Peritoneum/Retroperitoneum: There is no adenopathy, free air or free fluid. There is a retroaortic left renal vein. Calcific aorto iliac atherosclerotic disease with no evidence of an aneurysm. Bones/Soft Tissues: Unchanged/remote compression fractures of T12 and L5. No acute bone finding. No acute finding in the abdomen or pelvis. Cholelithiasis with porcelain gallbladder. No pericholecystic fluid or fat stranding to suggest acute cholecystitis. Interval partial sigmoidectomy. Few diverticular main proximal to the sigmoid anastomosis with no evidence of diverticulitis. Unchanged/remote T12 and L5 compression fractures. ASSESSMENT  and  PLAN     Principal Problem:    Hyponatremia  Active Problems:    History of gallstones    Peptic ulcer    Seizures ([address]    History of small bowel obstruction    Chronic abdominal pain  Resolved Problems:    * No resolved hospital problems. *    Plan:    Hyponatremia  -Initial sodium 119  -Check NA Q6hrs  -Urine studies pending  -Patient is currently on oxcarbazepine 300 mg twice daily for seizures. Will hold medication.  -Patient initially started on normal [email or web address]  -Consult nephrology for further management/recommendations    Chronic abdominal pain  -CT abdomen pelvis shows no acute findings. Cholelithiasis with porcelain gallbladder. No pericholecystic fluid or fat stranding to suggest acute cholecystitis.   -Morphine and Zofran for symptom management    Seizures  -Patient denies recent seizure activity  -States she has been taking her oxcarbazepine 300 mg bid  -Seizure and fall precautions    GI prophylaxis-Pepcid 20 mg IV twice daily  DVT prophylaxis-Lovenox 40 mg subcu daily    Consultations:     IP CONSULT TO INTERNAL MEDICINE  IP CONSULT TO INTERNAL MEDICINE  IP CONSULT TO NEPHROLOGY      ANDRE Gillis - CNP   7/8/2021  12:58 AM    Jolynn Pepe  [address] Phone [phone]     Attending Physician Statement  I have discussed the care of Vianca Matthew and I have examined the patient myselft and taken ros and hpi , including pertinent history and exam findings,  with the resident. I have reviewed the key elements of all parts of the encounter with the resident. I agree with the assessment, plan and orders as documented by the resident.   68-year-old morbidly obese lady class II BMI 28 lives with a sister admitted with abdominal pain chest pain CT scan showed porcelain gallbladder but nil acute  Routine labs revealed severe hyponatremia 118 likely secondary to decreased oral intake check for urine plasma osmolality urine sodium gentle hydration nephrology input requested  We will have the staff call the family patient lives with sister and has Parkinson's disease poor historian  Chest pain atypical in nature 4 months will order CTA rule out PE or dissection will review with results    Electronically signed by Toy Morales MD

## 2021-07-08 NOTE — ED PROVIDER NOTES
[address]  eMERGENCY dEPARTMENT eNCOUnter   Attending Attestation     Pt Name: Nicki Alvarez  MRN: [id number]  [place] 1950  Date of evaluation: 7/8/21    History, EXAM, MDM:    Nicki Alvarez is a 65 y.o. female who presents with Abdominal Pain (x1 month)  Presenting with c/o abdominal pain for the last month. On exam pt VSS. BP elevated. Generalized Ttp. CT scan shows no obstruction. No sign of cholecystitis. Laboratory studies show an acute hyponatremia. Appears to be a euovolemic hyponatremia. Pt does have a seizure disorder but has not had seizures and mental status currently appropriate. We'll admit to hospital for management of this hyponatremia and nephrology consult. Vitals:   Vitals:    07/07/21 2315 07/08/21 0015 07/08/21 0345 07/08/21 0730   BP: (!) 188/77 (!) 175/66  (!) 175/75   Pulse: 62 65  51   Resp: 18 16     Temp: 97.5 °F (36.4 °C) 97.4 °F (36.3 °C)  97.4 °F (36.3 °C)   TempSrc: Oral Oral  Oral   SpO2: 97% 96%  98%   Weight:   218 lb 4.1 oz (99 kg)    Height:         I performed a history and physical examination of the patient and discussed management with the resident. I reviewed the residents note and agree with the documented findings and plan of care. Any areas of disagreement are noted on the chart. I was personally present for the key portions of any procedures. I have documented in the chart those procedures where I was not present during the key portions. I have personally reviewed all images and agree with the resident's interpretation. I have reviewed the emergency nurses triage note. I agree with the chief complaint, past medical history, past surgical history, allergies, medications, social and family history as documented unless otherwise noted below. Documentation of the HPI, Physical Exam and Medical Decision Making performed by medical students or scribes is based on my personal performance of the HPI, PE and MDM.  For Phys Assistant/ Nurse Practitioner cases/documentation I have had a face to face evaluation of this patient and have completed at least one if not all key elements of the E/M (history, physical exam, and MDM). Additional findings are as noted.     Stefanie Blair MD  Attending Emergency  Physician                Stefanie Blair MD  07/08/21 9329

## 2021-07-08 NOTE — FLOWSHEET NOTE
Chaplains remain available for spiritual or emotional support as needed.       07/08/21 1337   Encounter Summary   Services provided to: Patient   Referral/Consult From: [facility] Family members   Continue Visiting   (7/8/2021)   Complexity of Encounter Moderate   Length of Encounter 15 minutes   Spiritual Assessment Completed Yes   Routine   Type Initial   Assessment Coping   Intervention Active listening;Nurtured hope;[place]   Outcome Expressed gratitude

## 2021-07-08 NOTE — CARE COORDINATION
CASE MANAGEMENT NOTE:    Admission Date:  7/7/2021 Ivanna Sage is a 65 y.o.  female    Admitted for : Hyponatremia [E87.1]    Met with:  Patient and spoke with Mahnaz Clarke her Legal guardian. Patient gave verbal consent to call him. PCP:  [id number]:  Pretty Mao      Is patient alert and oriented at time of discussion:  Yes    Current Residence/ Living Arrangements:  Dependent on family care and lives with sister opal gomez. Spoke with her legal guardian Mahnaz Clarke  And he advised opal is her care taker and patient is not able to live on her own. She has some mental illness, but does not see a phycologist  physician, but sees neurologist Dr Jay Olvera at [facility]. She apparently lived in a facility and opal  Had called [facility] and got her out of a facility ( he would not give writer name ) and stated patient was found to have a prolapsed retum and needed emergent surgery. This was not recent. IF patient needs to go to a facility, Legal Guardian must be notified. Otherwise sister Rut Monroy is her  Care taker. Current Services PTA:  No    Does patient go to outpatient dialysis: No  If yes, location and chair time:     Is patient agreeable to VNS: patient says No, will follow with sister opal    Freedom of choice provided:  Not yet    List of [address] provided: will need to talk with sister Rut Monroy    VNS chosen:  No decision yet    DME:  walker    Home Oxygen: No    Nebulizer: No    CPAP/BIPAP: No    Supplier: N/A    Potential Assistance Needed: Yes will need pt/ot     SNF needed: NA    Freedom of choice and list provided: NA    Pharm[facility]:  [facility] [address]       Does Patient want to use MEDS to BEDS? No    Is patient currently receiving oral anticoagulation therapy? No    Is the Patient an [facility] with Readmission Risk Score greater than 14%? No  If yes, pt needs a follow up appointment made within 7 days.     Family Members/Caregivers that pt would like involved in their care:    Yes    If yes, list name here:  Myron Cardoso    Transportation Provider:  Family             Discharge Plan:  7/8/21 - Tia Colvin Bere - Patient is from home with sister Tressa Bethea who is her care giver. Admitted for , now 89,Has hx of seizures and mental health issues. Unable to live on her own. .  If she needs rehab, must contact her legal guardian. DME: [facility] Jackson, will need to check with Sister about a VNS., Plan is to return home with no needs. Will follow closely.  .//pf                 Electronically signed by: Stacey Deal RN on 7/8/2021 at 2:03 PM

## 2021-07-08 NOTE — FLOWSHEET NOTE
07/08/21 0028   Provider Notification   Reason for Communication Review case   Provider Name Carmen Shelton   Provider Notification Advance Practice Clinician (CNS/NP/CNM/CRNA/PA)   Method of Communication Face to face   Response No new orders   Notification Time 0028   RN updated Carmen Shelton that patient retake blood pressure after medication was 175/66.  no new orders at this time

## 2021-07-09 LAB
ANION GAP SERPL CALCULATED.3IONS-SCNC: 9 MMOL/L (ref 9–17)
BUN BLDV-MCNC: 13 MG/DL (ref 8–23)
BUN/CREAT BLD: ABNORMAL (ref 9–20)
CALCIUM SERPL-MCNC: 8.2 MG/DL (ref 8.6–10.4)
CHLORIDE BLD-SCNC: 100 MMOL/L (ref 98–107)
CO2: 22 MMOL/L (ref 20–31)
CREAT SERPL-MCNC: 0.74 MG/DL (ref 0.5–0.9)
GFR AFRICAN AMERICAN: >60 ML/MIN
GFR NON-AFRICAN AMERICAN: >60 ML/MIN
GFR SERPL CREATININE-BSD FRML MDRD: ABNORMAL ML/MIN/{1.73_M2}
GFR SERPL CREATININE-BSD FRML MDRD: ABNORMAL ML/MIN/{1.73_M2}
GLUCOSE BLD-MCNC: 88 MG/DL (ref 70–99)
HCT VFR BLD CALC: 33.6 % (ref 36–46)
HEMOGLOBIN: 11.4 G/DL (ref 12–16)
MAGNESIUM: 2 MG/DL (ref 1.6–2.6)
MCH RBC QN AUTO: 30.4 PG (ref 26–34)
MCHC RBC AUTO-ENTMCNC: 33.9 G/DL (ref 31–37)
MCV RBC AUTO: 89.5 FL (ref 80–100)
NRBC AUTOMATED: ABNORMAL PER 100 WBC
PDW BLD-RTO: 13.4 % (ref 11.5–14.9)
PLATELET # BLD: 196 K/UL (ref 150–450)
PMV BLD AUTO: 6.7 FL (ref 6–12)
POTASSIUM SERPL-SCNC: 4.1 MMOL/L (ref 3.7–5.3)
RBC # BLD: 3.75 M/UL (ref 4–5.2)
SODIUM BLD-SCNC: 127 MMOL/L (ref 135–144)
SODIUM BLD-SCNC: 128 MMOL/L (ref 135–144)
SODIUM BLD-SCNC: 131 MMOL/L (ref 135–144)
SODIUM BLD-SCNC: 131 MMOL/L (ref 135–144)
WBC # BLD: 6.1 K/UL (ref 3.5–11)

## 2021-07-09 PROCEDURE — 99232 SBSQ HOSP IP/OBS MODERATE 35: CPT | Performed by: INTERNAL MEDICINE

## 2021-07-09 PROCEDURE — 6370000000 HC RX 637 (ALT 250 FOR IP): Performed by: NURSE PRACTITIONER

## 2021-07-09 PROCEDURE — 6360000002 HC RX W HCPCS: Performed by: NURSE PRACTITIONER

## 2021-07-09 PROCEDURE — 84295 ASSAY OF SERUM SODIUM: CPT

## 2021-07-09 PROCEDURE — 6370000000 HC RX 637 (ALT 250 FOR IP): Performed by: INTERNAL MEDICINE

## 2021-07-09 PROCEDURE — 2500000003 HC RX 250 WO HCPCS: Performed by: NURSE PRACTITIONER

## 2021-07-09 PROCEDURE — 80048 BASIC METABOLIC PNL TOTAL CA: CPT

## 2021-07-09 PROCEDURE — 85027 COMPLETE CBC AUTOMATED: CPT

## 2021-07-09 PROCEDURE — 36415 COLL VENOUS BLD VENIPUNCTURE: CPT

## 2021-07-09 PROCEDURE — 2580000003 HC RX 258: Performed by: NURSE PRACTITIONER

## 2021-07-09 PROCEDURE — 2580000003 HC RX 258: Performed by: INTERNAL MEDICINE

## 2021-07-09 PROCEDURE — 83735 ASSAY OF MAGNESIUM: CPT

## 2021-07-09 PROCEDURE — 2060000000 HC ICU INTERMEDIATE R&B

## 2021-07-09 RX ORDER — HYDRALAZINE HYDROCHLORIDE 25 MG/1
25 TABLET, FILM COATED ORAL EVERY 8 HOURS SCHEDULED
Status: DISCONTINUED | OUTPATIENT
Start: 2021-07-09 | End: 2021-07-13

## 2021-07-09 RX ORDER — SODIUM CHLORIDE 450 MG/100ML
INJECTION, SOLUTION INTRAVENOUS CONTINUOUS
Status: DISCONTINUED | OUTPATIENT
Start: 2021-07-09 | End: 2021-07-10 | Stop reason: ALTCHOICE

## 2021-07-09 RX ADMIN — ACETAMINOPHEN 650 MG: 325 TABLET ORAL at 03:27

## 2021-07-09 RX ADMIN — MORPHINE SULFATE 2 MG: 2 INJECTION, SOLUTION INTRAMUSCULAR; INTRAVENOUS at 08:30

## 2021-07-09 RX ADMIN — ACETAMINOPHEN 650 MG: 325 TABLET ORAL at 11:43

## 2021-07-09 RX ADMIN — HYDRALAZINE HYDROCHLORIDE 25 MG: 25 TABLET, FILM COATED ORAL at 12:36

## 2021-07-09 RX ADMIN — OXCARBAZEPINE 300 MG: 300 TABLET, FILM COATED ORAL at 08:31

## 2021-07-09 RX ADMIN — FAMOTIDINE 20 MG: 10 INJECTION, SOLUTION INTRAVENOUS at 20:04

## 2021-07-09 RX ADMIN — FAMOTIDINE 20 MG: 10 INJECTION, SOLUTION INTRAVENOUS at 08:31

## 2021-07-09 RX ADMIN — MORPHINE SULFATE 4 MG: 4 INJECTION, SOLUTION INTRAMUSCULAR; INTRAVENOUS at 02:17

## 2021-07-09 RX ADMIN — MORPHINE SULFATE 4 MG: 4 INJECTION, SOLUTION INTRAMUSCULAR; INTRAVENOUS at 21:51

## 2021-07-09 RX ADMIN — ENOXAPARIN SODIUM 40 MG: 40 INJECTION SUBCUTANEOUS at 08:31

## 2021-07-09 RX ADMIN — HYDRALAZINE HYDROCHLORIDE 25 MG: 25 TABLET, FILM COATED ORAL at 20:04

## 2021-07-09 RX ADMIN — OXCARBAZEPINE 300 MG: 300 TABLET, FILM COATED ORAL at 20:04

## 2021-07-09 RX ADMIN — SODIUM CHLORIDE: 4.5 INJECTION, SOLUTION INTRAVENOUS at 08:58

## 2021-07-09 RX ADMIN — SODIUM CHLORIDE, PRESERVATIVE FREE 10 ML: 5 INJECTION INTRAVENOUS at 20:04

## 2021-07-09 RX ADMIN — SODIUM CHLORIDE: 9 INJECTION, SOLUTION INTRAVENOUS at 02:17

## 2021-07-09 RX ADMIN — MORPHINE SULFATE 2 MG: 2 INJECTION, SOLUTION INTRAMUSCULAR; INTRAVENOUS at 11:43

## 2021-07-09 RX ADMIN — MORPHINE SULFATE 2 MG: 2 INJECTION, SOLUTION INTRAMUSCULAR; INTRAVENOUS at 16:00

## 2021-07-09 ASSESSMENT — PAIN DESCRIPTION - ORIENTATION
ORIENTATION: LOWER
ORIENTATION: LOWER

## 2021-07-09 ASSESSMENT — PAIN DESCRIPTION - LOCATION
LOCATION: ABDOMEN
LOCATION: BACK

## 2021-07-09 ASSESSMENT — PAIN SCALES - GENERAL
PAINLEVEL_OUTOF10: 2
PAINLEVEL_OUTOF10: 7
PAINLEVEL_OUTOF10: 8
PAINLEVEL_OUTOF10: 9
PAINLEVEL_OUTOF10: 9
PAINLEVEL_OUTOF10: 7
PAINLEVEL_OUTOF10: 3
PAINLEVEL_OUTOF10: 7
PAINLEVEL_OUTOF10: 9

## 2021-07-09 ASSESSMENT — PAIN DESCRIPTION - PAIN TYPE: TYPE: ACUTE PAIN

## 2021-07-09 NOTE — PROGRESS NOTES
appears stated age and cooperative    Skin: No rash but evidence of hirsutism. Head: Normocephalic, without obvious abnormality, atraumatic     Cardiovascular/Edema: regular rate and rhythm, S1, S2 normal, no murmur, click, rub or gallop    Respiratory: Lungs: clear to auscultation bilaterally    Abdomen: soft, non-tender; bowel sounds normal; no masses,  no organomegaly    Back: symmetric, no curvature. ROM normal. No CVA tenderness. Extremities: extremities normal, atraumatic, no cyanosis or edema    Neuro:  Involuntary movements especially of the muscles of mastication consistent with tardive dyskinesia    CBC:   Recent Labs     07/07/21  1900 07/08/21  0520 07/09/21 0522   WBC 8.4 5.7 6.1   HGB 12.8 11.7* 11.4*    215 196     BMP:    Recent Labs     07/07/21  1900 07/08/21  0006 07/08/21  0520 07/08/21  1019 07/08/21  2138 07/09/21  0143 07/09/21  0522   *   < > 122*   < > 128* 128* 131*   K 4.0  --  3.6*  --   --   --  4.1   CL 86*  --  91*  --   --   --  100   CO2 23  --  21  --   --   --  22   BUN 15  --  12  --   --   --  13   CREATININE 0.78  --  0.64  --   --   --  0.74   GLUCOSE 107*  --  97  --   --   --  88    < > = values in this interval not displayed. Lab Results   Component Value Date    NITRU NEGATIVE 07/07/2021    COLORU YELLOW 07/07/2021    PHUR 6.5 07/07/2021    WBCUA 0 TO 2 07/07/2021    RBCUA 0 TO 2 07/07/2021    MUCUS NOT REPORTED 07/07/2021    TRICHOMONAS NOT REPORTED 07/07/2021    YEAST NOT REPORTED 07/07/2021    BACTERIA FEW 07/07/2021    SPECGRAV 1.049 07/07/2021    LEUKOCYTESUR NEGATIVE 07/07/2021    UROBILINOGEN Normal 07/07/2021    BILIRUBINUR NEGATIVE 07/07/2021    BILIRUBINUR NEGATIVE 05/04/2012    GLUCOSEU NEGATIVE 07/07/2021    GLUCOSEU NEGATIVE 05/04/2012    KETUA NEGATIVE 07/07/2021    AMORPHOUS NOT REPORTED 07/07/2021     IMPRESSION/RECOMMENDATIONS:      1.   Acute severe hyponatremia - Patient is clinically euvolemic and hyponatremia in this patient is consistent with SIADH. This may be secondary to Oxcarbazepine[Trileptal]. Serum sodium is correcting at an acceptable rate, increasing by 12 mmol/L over. Of 36 hours. Plan: Change IV fluid to 0.45 normal saline at 50 mL/h. Check serum sodium level every 8 hours with target rate of correction of sodium not to exceed 0.5 mmol/L/h [maximum 0.8 mmol in the first 24 hours]. 2.  Systemic hypertension - Blood pressure control remains suboptimal.  Increase hydralazine to 25 mg p.o. 3 times daily. 3.  Hypokalemia - may be secondary to poor intake. Resolved. Prognosis is guarded.     Bernabe Welch MD FACP  Attending Nephrologist  7/9/2021 12:02 PM

## 2021-07-09 NOTE — PROGRESS NOTES
[facility] Internal Medicine    Progress Note     7/9/2021    12:17 PM    Name:   Ivanna Sage  MRN:     [id number]     Acct:      [de-identified]   Room:   [facility] [place] [address] Day:  2  Admit Date:  7/7/2021  6:15 PM    PCP:   ANDRE Juarez CNP  Code Status:  Full Code    Subjective:     C/C:   Chief Complaint   Patient presents with    Abdominal Pain     x1 month     Principal Problem:    Hyponatremia  Active Problems:    History of gallstones    Peptic ulcer    Seizures ([address]    History of small bowel obstruction    Chronic abdominal pain  Resolved Problems:    * No resolved hospital problems.  *        Significant last 24 hr data reviewed ;   Vitals:    07/08/21 1530 07/08/21 1936 07/09/21 0043 07/09/21 0745   BP: (!) 139/118 138/71 (!) 150/65 (!) 187/74   Pulse: 61 72 55 53   Resp:  20 18 18   Temp: 97.8 °F (36.6 °C) 98.1 °F (36.7 °C) 98.2 °F (36.8 °C) 98.1 °F (36.7 °C)   TempSrc: Oral Oral Oral Oral   SpO2: 96% 98% 98% 98%   Weight:       Height:          Recent Results (from the past 24 hour(s))   URIC ACID    Collection Time: 07/08/21  1:18 PM   Result Value Ref Range    Uric Acid 5.0 2.4 - 5.7 mg/dL   TSH WITH REFLEX    Collection Time: 07/08/21  1:18 PM   Result Value Ref Range    TSH 1.02 0.30 - 5.00 mIU/L   Sodium    Collection Time: 07/08/21  1:18 PM   Result Value Ref Range    Sodium 127 (L) 135 - 144 mmol/L   OSMOLALITY, URINE    Collection Time: 07/08/21  1:52 PM   Result Value Ref Range    Osmolality, Ur 536 80 - 1300 mOsm/kg   CHLORIDE, URINE, RANDOM    Collection Time: 07/08/21  1:52 PM   Result Value Ref Range    Chloride, Ur 29 mmol/L   SODIUM, URINE, RANDOM    Collection Time: 07/08/21  1:52 PM   Result Value Ref Range    Sodium,Ur 38 mmol/L   NA (Sodium)    Collection Time: 07/08/21  4:53 PM   Result Value Ref Range    Sodium 126 (L) 135 - 144 mmol/L   TROP/MYOGLOBIN    Collection Time: 07/08/21  4:53 PM   Result Value Ref Range    Troponin, High Sensitivity 23 (H) 0 - 14 ng/L    Troponin T NOT REPORTED <0.03 ng/mL    Troponin Interp NOT REPORTED     Myoglobin 60 (H) 25 - 58 ng/mL   NA (Sodium)    Collection Time: 07/08/21  9:38 PM   Result Value Ref Range    Sodium 128 (L) 135 - 144 mmol/L   NA (Sodium)    Collection Time: 07/09/21  1:43 AM   Result Value Ref Range    Sodium 128 (L) 135 - 144 mmol/L   Basic Metabolic Panel w/ Reflex to MG    Collection Time: 07/09/21  5:22 AM   Result Value Ref Range    Glucose 88 70 - 99 mg/dL    BUN 13 8 - 23 mg/dL    CREATININE 0.74 0.50 - 0.90 mg/dL    Bun/Cre Ratio NOT REPORTED 9 - 20    Calcium 8.2 (L) 8.6 - 10.4 mg/dL    Sodium 131 (L) 135 - 144 mmol/L    Potassium 4.1 3.7 - 5.3 mmol/L    Chloride 100 98 - 107 mmol/L    CO2 22 20 - 31 mmol/L    Anion Gap 9 9 - 17 mmol/L    GFR Non-African American >60 >60 mL/min    GFR African American >60 >60 mL/min    GFR Comment          GFR Staging NOT REPORTED    CBC    Collection Time: 07/09/21  5:22 AM   Result Value Ref Range    WBC 6.1 3.5 - 11.0 k/uL    RBC 3.75 (L) 4.0 - 5.2 m/uL    Hemoglobin 11.4 (L) 12.0 - 16.0 g/dL    Hematocrit 33.6 (L) 36 - 46 %    MCV 89.5 80 - 100 fL    MCH 30.4 26 - 34 pg    MCHC 33.9 31 - 37 g/dL    RDW 13.4 11.5 - 14.9 %    Platelets 900 825 - 620 k/uL    MPV 6.7 6.0 - 12.0 fL    NRBC Automated NOT REPORTED per 100 WBC   Magnesium    Collection Time: 07/09/21  5:22 AM   Result Value Ref Range    Magnesium 2.0 1.6 - 2.6 mg/dL     No results for input(s): POCGLU in the last 72 hours. CT ABDOMEN PELVIS W IV CONTRAST Additional Contrast? None    Result Date: 7/7/2021  EXAMINATION: CT OF THE ABDOMEN AND PELVIS WITH CONTRAST 7/7/2021 7:51 pm TECHNIQUE: CT of the abdomen and pelvis was performed with the administration of intravenous contrast. Multiplanar reformatted images are provided for review.  Dose modulation, iterative reconstruction, and/or weight based adjustment of the mA/kV was utilized to reduce the radiation dose to as low as reasonably achievable. COMPARISON: 04/19/2017 HISTORY: ORDERING SYSTEM PROVIDED HISTORY: Abdo pain, hx SBO TECHNOLOGIST PROVIDED HISTORY: Abdo pain, hx SBO Decision Support Exception - unselect if not a suspected or confirmed emergency medical condition->Emergency Medical Condition (MA) Reason for Exam: pt c/o abd pain x 1 month, pt unable to raise arms above head for CT exam. FINDINGS: Lower Chest: Heart size is normal.  Three-vessel calcific coronary artery disease. Small hiatal hernia. Lung bases are clear. Organs: Cholelithiasis with porcelain gallbladder. No pericholecystic fluid fat stranding. No change from prior study. The liver, spleen, pancreas and kidneys are normal.  Small nodular contour of the bilateral adrenal glands, unchanged. GI/Bowel: Interval partial sigmoidectomy. A few diverticula remain proximal to the sigmoid anastomosis. No evidence of diverticulitis. The appendix is normal.  No bowel obstruction. Pelvis: Urinary bladder is unremarkable. The uterus is surgically absent. Peritoneum/Retroperitoneum: There is no adenopathy, free air or free fluid. There is a retroaortic left renal vein. Calcific aorto iliac atherosclerotic disease with no evidence of an aneurysm. Bones/Soft Tissues: Unchanged/remote compression fractures of T12 and L5. No acute bone finding. No acute finding in the abdomen or pelvis. Cholelithiasis with porcelain gallbladder. No pericholecystic fluid or fat stranding to suggest acute cholecystitis. Interval partial sigmoidectomy. Few diverticular main proximal to the sigmoid anastomosis with no evidence of diverticulitis. Unchanged/remote T12 and L5 compression fractures. CT CHEST PULMONARY EMBOLISM W CONTRAST    Result Date: 7/8/2021  EXAMINATION: CTA OF THE CHEST 7/8/2021 11:13 am TECHNIQUE: CTA of the chest was performed after the administration of intravenous contrast.  Multiplanar reformatted images are provided for review. MIP images are provided for review. Dose modulation, iterative reconstruction, and/or weight based adjustment of the mA/kV was utilized to reduce the radiation dose to as low as reasonably achievable. COMPARISON: CT abdomen and pelvis yesterday. HISTORY: ORDERING SYSTEM PROVIDED HISTORY: chest pain rule out aaa ,pe TECHNOLOGIST PROVIDED HISTORY: chest pain rule out aaa ,pe Reason for Exam: chest pain rule out aaa ,pe Acuity: Unknown Type of Exam: Unknown Additional signs and symptoms: pt states chest/stomach pain Relevant Medical/Surgical History: no surgeries to area FINDINGS: Pulmonary Arteries: Pulmonary arteries are adequately opacified for evaluation. No evidence of intraluminal filling defect to suggest pulmonary embolism. Main pulmonary artery is normal in caliber. Mediastinum: No evidence of mediastinal lymphadenopathy. The heart and pericardium demonstrate no acute abnormality. There is no acute abnormality of the thoracic aorta. Lungs/pleura: No acute airspace disease identified. Mosaic attenuation of the lung parenchyma, likely due to regional air trapping. Sequela of old granulomatous disease. No effusion. The central airway is patent. Upper Abdomen: Cholelithiasis. Small hiatal hernia. Soft Tissues/Bones: No acute findings. Chronic T12 compression deformity. 1.  No evidence of pulmonary embolism or acute pulmonary abnormality. 2.  No thoracic aortic aneurysm or acute aortic abnormality appreciated. 3.  Cholelithiasis.              HPI:     Overnight no fever chills no nausea vomiting tolerating diet denies any chest pain abdominal pain patient is refusing to go back to live with her sister wants a place to be found for her    Review of Systems:     Constitutional:  negative for chills, fevers, sweats  Respiratory:  negative for cough, dyspnea on exertion, hemoptysis, shortness of breath, wheezing  Cardiovascular:  negative for chest pain, chest pressure/discomfort, lower extremity edema, palpitations  Gastrointestinal: negative for abdominal pain, constipation, diarrhea, nausea, vomiting  Neurological:  negative for dizziness, headache  Data:     Past Medical History:  no change     Social History:  no change    Family History: @no change    Vitals:      I/O (24Hr): Intake/Output Summary (Last 24 hours) at 7/9/2021 1217  Last data filed at 7/9/2021 0631  Gross per 24 hour   Intake --   Output 650 ml   Net -650 ml       Labs:    URINE ANALYSIS: No results found for: LABURIN     CBC:  Lab Results   Component Value Date    WBC 6.1 07/09/2021    HGB 11.4 07/09/2021     07/09/2021     05/04/2012        BMP:    Lab Results   Component Value Date     07/09/2021    K 4.1 07/09/2021     07/09/2021    CO2 22 07/09/2021    BUN 13 07/09/2021    CREATININE 0.74 07/09/2021    GLUCOSE 88 07/09/2021    GLUCOSE 96 05/04/2012      LIVER PROFILE:  Lab Results   Component Value Date    ALT 9 07/07/2021    AST 15 07/07/2021    PROT 7.3 07/07/2021    BILITOT 0.41 07/07/2021    BILIDIR <0.08 10/16/2015    LABALBU 4.4 07/07/2021    LABALBU 3.9 05/04/2012               Radiology:  Medications:      Allergies:      Current Meds:   Scheduled Meds:    hydrALAZINE  25 mg Oral 3 times per day    sodium chloride flush  5-40 mL Intravenous 2 times per day    enoxaparin  40 mg Subcutaneous Daily    famotidine (PEPCID) injection  20 mg Intravenous BID    OXcarbazepine  300 mg Oral BID     Continuous Infusions:    sodium chloride 50 mL/hr at 07/09/21 0858    sodium chloride       PRN Meds: sodium chloride flush, sodium chloride flush, sodium chloride flush, sodium chloride, potassium chloride **OR** potassium alternative oral replacement **OR** potassium chloride, magnesium sulfate, polyethylene glycol, acetaminophen **OR** acetaminophen, ondansetron, morphine **OR** morphine      Physical Examination:        BP (!) 187/74   Pulse 53   Temp 98.1 °F (36.7 °C) (Oral)   Resp 18   Ht 5' 6\" (1.676 m)   Wt 218 lb 4.1 oz (99 kg) SpO2 98%   BMI 35.23 kg/m²   Temp (24hrs), Av.1 °F (36.7 °C), Min:97.8 °F (36.6 °C), Max:98.2 °F (36.8 °C)    No results for input(s): POCGLU in the last 72 hours. Intake/Output Summary (Last 24 hours) at 2021 1217  Last data filed at 2021 0631  Gross per 24 hour   Intake --   Output 650 ml   Net -650 ml       General Appearance:  alert, well appearing, and in no acute distress  Mental status:   Head:  normocephalic, atraumatic. Eye: no icterus, redness, pupils equal and reactive, extraocular eye movements intact, conjunctiva clear  Ear: normal external ear, no discharge, hearing intact  Nose:  no drainage noted  Mouth: mucous membranes moist  No teeth and mouth  Neck: supple, no carotid bruits, thyroid not palpable  Lungs: Bilateral equal air entry, clear to ausculation, no wheezing, rales or rhonchi, normal effort  Cardiovascular: normal rate, regular rhythm, no murmur, gallop, rub.   Abdomen: Soft, nontender, nondistended, normal bowel sounds, no hepatomegaly or splenomegaly  Neurologic: There are no new focal motor or sensory deficits,  Frequent tongues and lip smacking likely part of Parkinson's disease  Skin: No gross lesions, rashes, bruising or bleeding on exposed skin area  Extremities:  peripheral pulses palpable, no pedal edema or calf pain with palpation              Assessment:        Primary Problem  Hyponatremia    Active Hospital Problems    Diagnosis Date Noted    History of small bowel obstruction [Z87.19] 2021    Chronic abdominal pain [R10.9, G89.29] 2021    Hyponatremia [E87.1] 2021    Seizures ([address] [R56.9] 2011    History of gallstones [Z87.19] 2011    Peptic ulcer [K27.9] 2011     Plan:        64-year-old morbidly obese lady class II BMI 28 lives with a sister admitted with abdominal pain chest pain CT scan showed porcelain gallbladder but nil acute  Routine labs revealed severe hyponatremia 118 likely secondary to decreased oral intake check for urine plasma osmolality urine sodium gentle hydration nephrology input requested  We will have the staff call the family patient lives with sister and has Parkinson's disease poor historian  Chest pain atypical in nature 4 months will order CTA rule out PE or dissection will review with results             July 9  Hyponatremia is improving\  Abdominal chest pain resolved CTA chest no dissection no PE noted  Porcelain gallbladder high risk for gallbladder malignancy needs outpatient surgical evaluation  Parkinson's disease and deconditioning requiring extended care facility      Osmin Lockett MD  7/9/2021  12:17 PM

## 2021-07-09 NOTE — PLAN OF CARE
Problem: Falls - Risk of:  Goal: Will remain free from falls  Description: Will remain free from falls  Outcome: Ongoing  Goal: Absence of physical injury  Description: Absence of physical injury  Outcome: Ongoing     Problem: Infection:  Goal: Will remain free from infection  Description: Will remain free from infection  Outcome: Ongoing     Problem: Safety:  Goal: Free from accidental physical injury  Description: Free from accidental physical injury  Outcome: Ongoing  Goal: Free from intentional harm  Description: Free from intentional harm  Outcome: Ongoing     Problem: Daily Care:  Goal: Daily care needs are met  Description: Daily care needs are met  Outcome: Ongoing     Problem: Pain:  Goal: Patient's pain/discomfort is manageable  Description: Patient's pain/discomfort is manageable  Outcome: Ongoing  Goal: Pain level will decrease  Description: Pain level will decrease  Outcome: Ongoing  Goal: Control of acute pain  Description: Control of acute pain  Outcome: Ongoing  Goal: Control of chronic pain  Description: Control of chronic pain  Outcome: Ongoing     Problem: Skin Integrity:  Goal: Skin integrity will stabilize  Description: Skin integrity will stabilize  Outcome: Ongoing     Problem: Discharge Planning:  Goal: Patients continuum of care needs are met  Description: Patients continuum of care needs are met  Outcome: Ongoing     Problem: Skin Integrity:  Goal: Will show no infection signs and symptoms  Description: Will show no infection signs and symptoms  Outcome: Ongoing  Goal: Absence of new skin breakdown  Description: Absence of new skin breakdown  Outcome: Ongoing

## 2021-07-10 LAB
ANION GAP SERPL CALCULATED.3IONS-SCNC: 10 MMOL/L (ref 9–17)
BUN BLDV-MCNC: 10 MG/DL (ref 8–23)
BUN/CREAT BLD: ABNORMAL (ref 9–20)
CALCIUM SERPL-MCNC: 9 MG/DL (ref 8.6–10.4)
CHLORIDE BLD-SCNC: 98 MMOL/L (ref 98–107)
CO2: 23 MMOL/L (ref 20–31)
CREAT SERPL-MCNC: 0.7 MG/DL (ref 0.5–0.9)
EKG ATRIAL RATE: 54 BPM
EKG P AXIS: 85 DEGREES
EKG P-R INTERVAL: 214 MS
EKG Q-T INTERVAL: 476 MS
EKG QRS DURATION: 86 MS
EKG QTC CALCULATION (BAZETT): 451 MS
EKG R AXIS: 64 DEGREES
EKG T AXIS: 74 DEGREES
EKG VENTRICULAR RATE: 54 BPM
GFR AFRICAN AMERICAN: >60 ML/MIN
GFR NON-AFRICAN AMERICAN: >60 ML/MIN
GFR SERPL CREATININE-BSD FRML MDRD: ABNORMAL ML/MIN/{1.73_M2}
GFR SERPL CREATININE-BSD FRML MDRD: ABNORMAL ML/MIN/{1.73_M2}
GLUCOSE BLD-MCNC: 100 MG/DL (ref 70–99)
HCT VFR BLD CALC: 39 % (ref 36–46)
HEMOGLOBIN: 13.1 G/DL (ref 12–16)
MAGNESIUM: 1.9 MG/DL (ref 1.6–2.6)
MCH RBC QN AUTO: 30.1 PG (ref 26–34)
MCHC RBC AUTO-ENTMCNC: 33.5 G/DL (ref 31–37)
MCV RBC AUTO: 89.8 FL (ref 80–100)
NRBC AUTOMATED: NORMAL PER 100 WBC
PDW BLD-RTO: 13.4 % (ref 11.5–14.9)
PLATELET # BLD: 240 K/UL (ref 150–450)
PMV BLD AUTO: 6.6 FL (ref 6–12)
POTASSIUM SERPL-SCNC: 4.2 MMOL/L (ref 3.7–5.3)
RBC # BLD: 4.34 M/UL (ref 4–5.2)
SODIUM BLD-SCNC: 130 MMOL/L (ref 135–144)
SODIUM BLD-SCNC: 131 MMOL/L (ref 135–144)
WBC # BLD: 6.8 K/UL (ref 3.5–11)

## 2021-07-10 PROCEDURE — 6370000000 HC RX 637 (ALT 250 FOR IP): Performed by: NURSE PRACTITIONER

## 2021-07-10 PROCEDURE — 1200000000 HC SEMI PRIVATE

## 2021-07-10 PROCEDURE — 6360000002 HC RX W HCPCS: Performed by: NURSE PRACTITIONER

## 2021-07-10 PROCEDURE — 99232 SBSQ HOSP IP/OBS MODERATE 35: CPT | Performed by: INTERNAL MEDICINE

## 2021-07-10 PROCEDURE — 2580000003 HC RX 258: Performed by: NURSE PRACTITIONER

## 2021-07-10 PROCEDURE — 97162 PT EVAL MOD COMPLEX 30 MIN: CPT

## 2021-07-10 PROCEDURE — 6370000000 HC RX 637 (ALT 250 FOR IP): Performed by: INTERNAL MEDICINE

## 2021-07-10 PROCEDURE — 83735 ASSAY OF MAGNESIUM: CPT

## 2021-07-10 PROCEDURE — 97530 THERAPEUTIC ACTIVITIES: CPT

## 2021-07-10 PROCEDURE — 2500000003 HC RX 250 WO HCPCS: Performed by: NURSE PRACTITIONER

## 2021-07-10 PROCEDURE — 93010 ELECTROCARDIOGRAM REPORT: CPT | Performed by: INTERNAL MEDICINE

## 2021-07-10 PROCEDURE — 36415 COLL VENOUS BLD VENIPUNCTURE: CPT

## 2021-07-10 PROCEDURE — 85027 COMPLETE CBC AUTOMATED: CPT

## 2021-07-10 PROCEDURE — 84295 ASSAY OF SERUM SODIUM: CPT

## 2021-07-10 PROCEDURE — 2580000003 HC RX 258: Performed by: INTERNAL MEDICINE

## 2021-07-10 PROCEDURE — 80048 BASIC METABOLIC PNL TOTAL CA: CPT

## 2021-07-10 RX ORDER — HYDRALAZINE HYDROCHLORIDE 20 MG/ML
20 INJECTION INTRAMUSCULAR; INTRAVENOUS ONCE
Status: COMPLETED | OUTPATIENT
Start: 2021-07-10 | End: 2021-07-10

## 2021-07-10 RX ADMIN — HYDRALAZINE HYDROCHLORIDE 25 MG: 25 TABLET, FILM COATED ORAL at 22:46

## 2021-07-10 RX ADMIN — FAMOTIDINE 20 MG: 10 INJECTION, SOLUTION INTRAVENOUS at 19:50

## 2021-07-10 RX ADMIN — ONDANSETRON 4 MG: 2 INJECTION INTRAMUSCULAR; INTRAVENOUS at 10:00

## 2021-07-10 RX ADMIN — MORPHINE SULFATE 4 MG: 4 INJECTION, SOLUTION INTRAMUSCULAR; INTRAVENOUS at 02:29

## 2021-07-10 RX ADMIN — HYDRALAZINE HYDROCHLORIDE 20 MG: 20 INJECTION INTRAMUSCULAR; INTRAVENOUS at 01:01

## 2021-07-10 RX ADMIN — SODIUM CHLORIDE, PRESERVATIVE FREE 10 ML: 5 INJECTION INTRAVENOUS at 09:37

## 2021-07-10 RX ADMIN — ENOXAPARIN SODIUM 30 MG: 30 INJECTION SUBCUTANEOUS at 19:50

## 2021-07-10 RX ADMIN — FAMOTIDINE 20 MG: 10 INJECTION, SOLUTION INTRAVENOUS at 10:00

## 2021-07-10 RX ADMIN — HYDRALAZINE HYDROCHLORIDE 25 MG: 25 TABLET, FILM COATED ORAL at 14:29

## 2021-07-10 RX ADMIN — MORPHINE SULFATE 2 MG: 2 INJECTION, SOLUTION INTRAMUSCULAR; INTRAVENOUS at 14:28

## 2021-07-10 RX ADMIN — OXCARBAZEPINE 300 MG: 300 TABLET, FILM COATED ORAL at 22:54

## 2021-07-10 RX ADMIN — SODIUM CHLORIDE, PRESERVATIVE FREE 10 ML: 5 INJECTION INTRAVENOUS at 22:55

## 2021-07-10 RX ADMIN — HYDRALAZINE HYDROCHLORIDE 25 MG: 25 TABLET, FILM COATED ORAL at 06:03

## 2021-07-10 RX ADMIN — MORPHINE SULFATE 2 MG: 2 INJECTION, SOLUTION INTRAMUSCULAR; INTRAVENOUS at 06:20

## 2021-07-10 RX ADMIN — ENOXAPARIN SODIUM 40 MG: 40 INJECTION SUBCUTANEOUS at 10:00

## 2021-07-10 RX ADMIN — OXCARBAZEPINE 300 MG: 300 TABLET, FILM COATED ORAL at 10:00

## 2021-07-10 RX ADMIN — SODIUM CHLORIDE: 4.5 INJECTION, SOLUTION INTRAVENOUS at 00:26

## 2021-07-10 ASSESSMENT — PAIN SCALES - GENERAL
PAINLEVEL_OUTOF10: 6
PAINLEVEL_OUTOF10: 0
PAINLEVEL_OUTOF10: 7
PAINLEVEL_OUTOF10: 5
PAINLEVEL_OUTOF10: 4

## 2021-07-10 ASSESSMENT — PAIN DESCRIPTION - PAIN TYPE: TYPE: ACUTE PAIN

## 2021-07-10 ASSESSMENT — PAIN DESCRIPTION - ORIENTATION: ORIENTATION: LOWER

## 2021-07-10 ASSESSMENT — PAIN DESCRIPTION - LOCATION: LOCATION: ABDOMEN;BACK

## 2021-07-10 NOTE — PROGRESS NOTES
[facility] Internal Medicine    Progress Note     7/10/2021    9:10 AM    Name:   Suzy Gifford  MRN:     [id number]     Acct:      [de-identified]   Room:   [id number] Day:  3  Admit Date:  7/7/2021  6:15 PM    PCP:   ANDRE Laws CNP  Code Status:  Full Code    Subjective:     C/C:   Chief Complaint   Patient presents with    Abdominal Pain     x1 month     Principal Problem:    Hyponatremia  Active Problems:    History of gallstones    Peptic ulcer    Seizures ([address]    History of small bowel obstruction    Chronic abdominal pain  Resolved Problems:    * No resolved hospital problems.  *        Significant last 24 hr data reviewed ;   Vitals:    07/10/21 0100 07/10/21 0106 07/10/21 0112 07/10/21 0500   BP: (!) 220/80 (!) 193/67 (!) 184/71    Pulse: 58 61 72    Resp:       Temp:       TempSrc:       SpO2:       Weight:    225 lb 1.4 oz (102.1 kg)   Height:          Recent Results (from the past 24 hour(s))   NA (Sodium)    Collection Time: 07/09/21  1:47 PM   Result Value Ref Range    Sodium 131 (L) 135 - 144 mmol/L   NA (Sodium)    Collection Time: 07/09/21  9:52 PM   Result Value Ref Range    Sodium 127 (L) 135 - 144 mmol/L   Basic Metabolic Panel w/ Reflex to MG    Collection Time: 07/10/21  5:58 AM   Result Value Ref Range    Glucose 100 (H) 70 - 99 mg/dL    BUN 10 8 - 23 mg/dL    CREATININE 0.70 0.50 - 0.90 mg/dL    Bun/Cre Ratio NOT REPORTED 9 - 20    Calcium 9.0 8.6 - 10.4 mg/dL    Sodium 131 (L) 135 - 144 mmol/L    Potassium 4.2 3.7 - 5.3 mmol/L    Chloride 98 98 - 107 mmol/L    CO2 23 20 - 31 mmol/L    Anion Gap 10 9 - 17 mmol/L    GFR Non-African American >60 >60 mL/min    GFR African American >60 >60 mL/min    GFR Comment          GFR Staging NOT REPORTED    CBC    Collection Time: 07/10/21  5:58 AM   Result Value Ref Range    WBC 6.8 3.5 - 11.0 k/uL    RBC 4.34 4.0 - 5.2 m/uL    Hemoglobin 13.1 12.0 - 16.0 g/dL    Hematocrit 39.0 36 - 46 %    MCV 89.8 80 - 100 fL    MCH 30.1 26 - 34 pg    MCHC 33.5 31 - 37 g/dL    RDW 13.4 11.5 - 14.9 %    Platelets 931 816 - 131 k/uL    MPV 6.6 6.0 - 12.0 fL    NRBC Automated NOT REPORTED per 100 WBC   Magnesium    Collection Time: 07/10/21  5:58 AM   Result Value Ref Range    Magnesium 1.9 1.6 - 2.6 mg/dL     No results for input(s): POCGLU in the last 72 hours. CT ABDOMEN PELVIS W IV CONTRAST Additional Contrast? None    Result Date: 7/7/2021  EXAMINATION: CT OF THE ABDOMEN AND PELVIS WITH CONTRAST 7/7/2021 7:51 pm TECHNIQUE: CT of the abdomen and pelvis was performed with the administration of intravenous contrast. Multiplanar reformatted images are provided for review. Dose modulation, iterative reconstruction, and/or weight based adjustment of the mA/kV was utilized to reduce the radiation dose to as low as reasonably achievable. COMPARISON: 04/19/2017 HISTORY: ORDERING SYSTEM PROVIDED HISTORY: Abdo pain, hx SBO TECHNOLOGIST PROVIDED HISTORY: Abdo pain, hx SBO Decision Support Exception - unselect if not a suspected or confirmed emergency medical condition->Emergency Medical Condition (MA) Reason for Exam: pt c/o abd pain x 1 month, pt unable to raise arms above head for CT exam. FINDINGS: Lower Chest: Heart size is normal.  Three-vessel calcific coronary artery disease. Small hiatal hernia. Lung bases are clear. Organs: Cholelithiasis with porcelain gallbladder. No pericholecystic fluid fat stranding. No change from prior study. The liver, spleen, pancreas and kidneys are normal.  Small nodular contour of the bilateral adrenal glands, unchanged. GI/Bowel: Interval partial sigmoidectomy. A few diverticula remain proximal to the sigmoid anastomosis. No evidence of diverticulitis. The appendix is normal.  No bowel obstruction. Pelvis: Urinary bladder is unremarkable. The uterus is surgically absent. Peritoneum/Retroperitoneum: There is no adenopathy, free air or free fluid. There is a retroaortic left renal vein. Calcific aorto iliac atherosclerotic disease with no evidence of an aneurysm. Bones/Soft Tissues: Unchanged/remote compression fractures of T12 and L5. No acute bone finding. No acute finding in the abdomen or pelvis. Cholelithiasis with porcelain gallbladder. No pericholecystic fluid or fat stranding to suggest acute cholecystitis. Interval partial sigmoidectomy. Few diverticular main proximal to the sigmoid anastomosis with no evidence of diverticulitis. Unchanged/remote T12 and L5 compression fractures. CT CHEST PULMONARY EMBOLISM W CONTRAST    Result Date: 7/8/2021  EXAMINATION: CTA OF THE CHEST 7/8/2021 11:13 am TECHNIQUE: CTA of the chest was performed after the administration of intravenous contrast.  Multiplanar reformatted images are provided for review. MIP images are provided for review. Dose modulation, iterative reconstruction, and/or weight based adjustment of the mA/kV was utilized to reduce the radiation dose to as low as reasonably achievable. COMPARISON: CT abdomen and pelvis yesterday. HISTORY: ORDERING SYSTEM PROVIDED HISTORY: chest pain rule out aaa ,pe TECHNOLOGIST PROVIDED HISTORY: chest pain rule out aaa ,pe Reason for Exam: chest pain rule out aaa ,pe Acuity: Unknown Type of Exam: Unknown Additional signs and symptoms: pt states chest/stomach pain Relevant Medical/Surgical History: no surgeries to area FINDINGS: Pulmonary Arteries: Pulmonary arteries are adequately opacified for evaluation. No evidence of intraluminal filling defect to suggest pulmonary embolism. Main pulmonary artery is normal in caliber. Mediastinum: No evidence of mediastinal lymphadenopathy. The heart and pericardium demonstrate no acute abnormality. There is no acute abnormality of the thoracic aorta. Lungs/pleura: No acute airspace disease identified. Mosaic attenuation of the lung parenchyma, likely due to regional air trapping. Sequela of old granulomatous disease. No effusion.   The central airway is patent. Upper Abdomen: Cholelithiasis. Small hiatal hernia. Soft Tissues/Bones: No acute findings. Chronic T12 compression deformity. 1.  No evidence of pulmonary embolism or acute pulmonary abnormality. 2.  No thoracic aortic aneurysm or acute aortic abnormality appreciated. 3.  Cholelithiasis. HPI:     Overnight no fever chills no nausea vomiting tolerating diet denies any chest pain abdominal pain patient is refusing to go back to live with her sister wants a place to be found for her    Review of Systems:     Constitutional:  negative for chills, fevers, sweats  Respiratory:  negative for cough, dyspnea on exertion, hemoptysis, shortness of breath, wheezing  Cardiovascular:  negative for chest pain, chest pressure/discomfort, lower extremity edema, palpitations  Gastrointestinal:  negative for abdominal pain, constipation, diarrhea, nausea, vomiting  Neurological:  negative for dizziness, headache  Data:     Past Medical History:  no change     Social History:  no change    Family History: @no change    Vitals:      I/O (24Hr): Intake/Output Summary (Last 24 hours) at 7/10/2021 0910  Last data filed at 7/10/2021 0656  Gross per 24 hour   Intake 800 ml   Output 2300 ml   Net -1500 ml       Labs:    URINE ANALYSIS: No results found for: LABURIN     CBC:  Lab Results   Component Value Date    WBC 6.8 07/10/2021    HGB 13.1 07/10/2021     07/10/2021     05/04/2012        BMP:    Lab Results   Component Value Date     07/10/2021    K 4.2 07/10/2021    CL 98 07/10/2021    CO2 23 07/10/2021    BUN 10 07/10/2021    CREATININE 0.70 07/10/2021    GLUCOSE 100 07/10/2021    GLUCOSE 96 05/04/2012      LIVER PROFILE:  Lab Results   Component Value Date    ALT 9 07/07/2021    AST 15 07/07/2021    PROT 7.3 07/07/2021    BILITOT 0.41 07/07/2021    BILIDIR <0.08 10/16/2015    LABALBU 4.4 07/07/2021    LABALBU 3.9 05/04/2012               Radiology:  Medications:      Allergies: Current Meds:   Scheduled Meds:    hydrALAZINE  25 mg Oral 3 times per day    sodium chloride flush  5-40 mL Intravenous 2 times per day    enoxaparin  40 mg Subcutaneous Daily    famotidine (PEPCID) injection  20 mg Intravenous BID    OXcarbazepine  300 mg Oral BID     Continuous Infusions:    sodium chloride       PRN Meds: sodium chloride flush, sodium chloride flush, sodium chloride flush, sodium chloride, potassium chloride **OR** potassium alternative oral replacement **OR** potassium chloride, magnesium sulfate, polyethylene glycol, acetaminophen **OR** acetaminophen, ondansetron, morphine **OR** morphine      Physical Examination:        BP (!) 184/71   Pulse 72   Temp 97.7 °F (36.5 °C) (Axillary)   Resp 18   Ht 5' 6\" (1.676 m)   Wt 225 lb 1.4 oz (102.1 kg)   SpO2 97%   BMI 36.33 kg/m²   Temp (24hrs), Av.6 °F (36.4 °C), Min:97.3 °F (36.3 °C), Max:97.8 °F (36.6 °C)    No results for input(s): POCGLU in the last 72 hours. Intake/Output Summary (Last 24 hours) at 7/10/2021 0910  Last data filed at 7/10/2021 0656  Gross per 24 hour   Intake 800 ml   Output 2300 ml   Net -1500 ml       General Appearance:  alert, well appearing, and in no acute distress  Mental status:   Head:  normocephalic, atraumatic. Eye: no icterus, redness, pupils equal and reactive, extraocular eye movements intact, conjunctiva clear  Ear: normal external ear, no discharge, hearing intact  Nose:  no drainage noted  Mouth: mucous membranes moist  No teeth and mouth  Neck: supple, no carotid bruits, thyroid not palpable  Lungs: Bilateral equal air entry, clear to ausculation, no wheezing, rales or rhonchi, normal effort  Cardiovascular: normal rate, regular rhythm, no murmur, gallop, rub.   Abdomen: Soft, nontender, nondistended, normal bowel sounds, no hepatomegaly or splenomegaly  Neurologic: There are no new focal motor or sensory deficits,  Frequent tongues and lip smacking likely part of Parkinson's disease  Skin: No gross lesions, rashes, bruising or bleeding on exposed skin area  Extremities:  peripheral pulses palpable, no pedal edema or calf pain with palpation              Assessment:        Primary Problem  Hyponatremia    Active Hospital Problems    Diagnosis Date Noted    History of small bowel obstruction [Z87.19] 07/08/2021    Chronic abdominal pain [R10.9, G89.29] 07/08/2021    Hyponatremia [E87.1] 07/07/2021    Seizures ([address] [R56.9] 12/19/2011    History of gallstones [Z87.19] 12/19/2011    Peptic ulcer [K27.9] 12/19/2011     Plan:        22-year-old morbidly obese lady class II BMI 28 lives with a sister admitted with abdominal pain chest pain CT scan showed porcelain gallbladder but nil acute  Routine labs revealed severe hyponatremia 118 likely secondary to decreased oral intake check for urine plasma osmolality urine sodium gentle hydration nephrology input requested  We will have the staff call the family patient lives with sister and has Parkinson's disease poor historian  Chest pain atypical in nature 4 months will order CTA rule out PE or dissection will review with results             July 10  Hyponatremia is improving  Abdominal chest pain resolved CTA chest no dissection no PE noted  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

## 2021-07-10 NOTE — PLAN OF CARE
Problem: Falls - Risk of:  Goal: Will remain free from falls  Description: Will remain free from falls  7/10/2021 0142 by Ubaldo Romero RN  Outcome: Ongoing  7/9/2021 1725 by Ami Huggins RN  Outcome: Ongoing  Goal: Absence of physical injury  Description: Absence of physical injury  7/10/2021 0142 by Ubaldo Romero RN  Outcome: Ongoing  7/9/2021 1725 by Ami Huggins RN  Outcome: Ongoing     Problem: Infection:  Goal: Will remain free from infection  Description: Will remain free from infection  7/10/2021 0142 by Ubaldo Romero RN  Outcome: Ongoing  7/9/2021 1725 by Ami Huggins RN  Outcome: Ongoing     Problem: Safety:  Goal: Free from accidental physical injury  Description: Free from accidental physical injury  7/10/2021 0142 by Ubaldo Romero RN  Outcome: Ongoing  7/9/2021 1725 by Ami Huggins RN  Outcome: Ongoing  Goal: Free from intentional harm  Description: Free from intentional harm  7/10/2021 0142 by Ubaldo Romero RN  Outcome: Ongoing  7/9/2021 1725 by Ami Huggins RN  Outcome: Ongoing     Problem: Daily Care:  Goal: Daily care needs are met  Description: Daily care needs are met  7/10/2021 0142 by Ubaldo Romero RN  Outcome: Ongoing  7/9/2021 1725 by Ami Huggins RN  Outcome: Not Met This Shift     Problem: Pain:  Goal: Patient's pain/discomfort is manageable  Description: Patient's pain/discomfort is manageable  7/10/2021 0142 by Ubaldo Romero RN  Outcome: Ongoing  7/9/2021 1725 by Ami Huggins RN  Outcome: Not Met This Shift  Goal: Pain level will decrease  Description: Pain level will decrease  7/10/2021 0142 by Ubaldo Romero RN  Outcome: Ongoing  7/9/2021 1725 by Ami Huggins RN  Outcome: Not Met This Shift  Goal: Control of acute pain  Description: Control of acute pain  7/10/2021 0142 by Ubaldo Romero RN  Outcome: Ongoing  7/9/2021 1725 by Ami Huggins RN  Outcome: Not Met This Shift  Goal: Control of chronic pain  Description: Control of chronic pain  7/10/2021 0142 by Kia Hill RN  Outcome: Ongoing  7/9/2021 1725 by Seth Phillips RN  Outcome: Not Met This Shift     Problem: Skin Integrity:  Goal: Skin integrity will stabilize  Description: Skin integrity will stabilize  7/10/2021 0142 by Kia Hill RN  Outcome: Ongoing  7/9/2021 1725 by Seth Phillips RN  Outcome: Not Met This Shift     Problem: Discharge Planning:  Goal: Patients continuum of care needs are met  Description: Patients continuum of care needs are met  7/10/2021 0142 by Kia Hill RN  Outcome: Ongoing  7/9/2021 1725 by Seth Phillips RN  Outcome: Not Met This Shift     Problem: Skin Integrity:  Goal: Will show no infection signs and symptoms  Description: Will show no infection signs and symptoms  7/10/2021 0142 by Kia Hill RN  Outcome: Ongoing  7/9/2021 1725 by Seth Phillips RN  Outcome: Not Met This Shift  Goal: Absence of new skin breakdown  Description: Absence of new skin breakdown  7/10/2021 0142 by Kia Hill RN  Outcome: Ongoing  7/9/2021 1725 by Seth Phillips RN  Outcome: Not Met This Shift

## 2021-07-10 NOTE — PROGRESS NOTES
SW informed of guardians's request to send pt to [facility]. SW sent referral and spoke to [place]. She will review referral. Await input from therapy as pt will need a pre-cert.

## 2021-07-10 NOTE — PROGRESS NOTES
Department of Internal Medicine  Nephrology Ying Sutton MD  Progress Note    Reason for consultation: Management of hyponatremia. Consulting physician: Pamela Kennedy. Don Wisdom MD.    Interval history: Patient complains today of urinary incontinence. Abdominal pain is improved and she does not have nausea or vomiting. Serum sodium is up to 131 mmol/L. History of present illness: This is a 65 y.o. female with a significant past medical history of Bipolar disorder, hyperlipidemia, chronic obstructive pulmonary disease and systemic hypertension, who presented to the emergency department yesterday with complaints of Acute on chronic abdominal pain of 4 weeks duration. There was no associated fever, chills, nausea, vomiting, constipation or diarrhea. She did not have any seizures. Laboratory studies were remarkable for serum sodium 119 mmol/L and hence nephrology consultation. Home medications Trileptal and Keppra.      Scheduled Meds:   hydrALAZINE  25 mg Oral 3 times per day    sodium chloride flush  5-40 mL Intravenous 2 times per day    enoxaparin  40 mg Subcutaneous Daily    famotidine (PEPCID) injection  20 mg Intravenous BID    OXcarbazepine  300 mg Oral BID     Continuous Infusions:   sodium chloride       PRN Meds:.sodium chloride flush, sodium chloride flush, sodium chloride flush, sodium chloride, potassium chloride **OR** potassium alternative oral replacement **OR** potassium chloride, magnesium sulfate, polyethylene glycol, acetaminophen **OR** acetaminophen, ondansetron, morphine **OR** morphine    Physical Exam:    VITALS:  BP (!) 143/51   Pulse 69   Temp 97.8 °F (36.6 °C) (Oral)   Resp 16   Ht 5' 6\" (1.676 m)   Wt 225 lb 1.4 oz (102.1 kg)   SpO2 97%   BMI 36.33 kg/m²   24HR INTAKE/OUTPUT:      Intake/Output Summary (Last 24 hours) at 7/10/2021 1142  Last data filed at 7/10/2021 0656  Gross per 24 hour   Intake 800 ml   Output 2300 ml   Net -1500 ml     Constitutional: alert, appears stated age and cooperative    Skin: No rash but evidence of hirsutism. Head: Normocephalic, without obvious abnormality, atraumatic     Cardiovascular/Edema: regular rate and rhythm, S1, S2 normal, no murmur, click, rub or gallop    Respiratory: Lungs: clear to auscultation bilaterally    Abdomen: soft, non-tender; bowel sounds normal; no masses,  no organomegaly    Back: symmetric, no curvature. ROM normal. No CVA tenderness. Extremities: extremities normal, atraumatic, no cyanosis or edema    Neuro:  Involuntary movements especially of the muscles of mastication consistent with tardive dyskinesia    CBC:   Recent Labs     07/08/21 0520 07/09/21  0522 07/10/21  0558   WBC 5.7 6.1 6.8   HGB 11.7* 11.4* 13.1    196 240     BMP:    Recent Labs     07/08/21 0520 07/08/21  1019 07/09/21  0522 07/09/21  0522 07/09/21  1347 07/09/21  2152 07/10/21  0558   *   < > 131*   < > 131* 127* 131*   K 3.6*  --  4.1  --   --   --  4.2   CL 91*  --  100  --   --   --  98   CO2 21  --  22  --   --   --  23   BUN 12  --  13  --   --   --  10   CREATININE 0.64  --  0.74  --   --   --  0.70   GLUCOSE 97  --  88  --   --   --  100*    < > = values in this interval not displayed. Lab Results   Component Value Date    NITRU NEGATIVE 07/07/2021    COLORU YELLOW 07/07/2021    PHUR 6.5 07/07/2021    WBCUA 0 TO 2 07/07/2021    RBCUA 0 TO 2 07/07/2021    MUCUS NOT REPORTED 07/07/2021    TRICHOMONAS NOT REPORTED 07/07/2021    YEAST NOT REPORTED 07/07/2021    BACTERIA FEW 07/07/2021    SPECGRAV 1.049 07/07/2021    LEUKOCYTESUR NEGATIVE 07/07/2021    UROBILINOGEN Normal 07/07/2021    BILIRUBINUR NEGATIVE 07/07/2021    BILIRUBINUR NEGATIVE 05/04/2012    GLUCOSEU NEGATIVE 07/07/2021    GLUCOSEU NEGATIVE 05/04/2012    KETUA NEGATIVE 07/07/2021    AMORPHOUS NOT REPORTED 07/07/2021     IMPRESSION/RECOMMENDATIONS:      1.   Hyponatremia - Patient is clinically euvolemic and hyponatremia in this patient is consistent with SIADH.  This may be secondary to Oxcarbazepine[Trileptal]. Plan: Discontinue IV fluid. Fluid restriction 1500 mL/day. Check serum sodium level every 8 hours with target rate of correction of sodium not to exceed 0.5 mmol/L/h [maximum 0.8 mmol in the first 24 hours]. 2.  Systemic hypertension - Continue hydralazine 25 mg p.o. 3 times daily. 3.  Urinary incontinence - check bladder scan. Prognosis is guarded.     Wil Grady MD FACP  Attending Nephrologist  7/10/2021 11:42 AM

## 2021-07-10 NOTE — PROGRESS NOTES
Physical Therapy    Facility/Department: [facility] MED SURG  Initial Assessment    NAME: Elbert Prasad  : 1950  MRN: [id number]    Date of Service: 7/10/2021    Discharge Recommendations:  Patient would benefit from continued therapy after discharge        Assessment   Body structures, Functions, Activity limitations: Decreased functional mobility ; Decreased strength;Decreased endurance;Decreased balance  Assessment: Pt shows significant decline in functional status compared to her prior level of functioning. Treatment Diagnosis: impaired strength and mobility  Specific instructions for Next Treatment: ther exs and ther activities as tolerated by pt  Prognosis: Fair  Decision Making: Medium Complexity  History: Charanjit Teague is a 65 y.o. female who presents to ER on 21 with 4-week history of chronic abdominal pain with multiple history of small bowel obstructions. Patient states that she has not been to seen a doctor in the last 4 weeks for the pain as it was initially manageable, however has worsened in the last couple of days  Exam: MMT, ROM, sensation , functional mobility and balance assessment. Clinical Presentation: Pt not very cooperative, constantly talking , needed maximal assistance for bed mobility. Barriers to Learning: motivation  REQUIRES PT FOLLOW UP: Yes  Activity Tolerance  Activity Tolerance: Patient Tolerated treatment well       Patient Diagnosis(es): The encounter diagnosis was Generalized abdominal pain. has a past medical history of Bowel obstruction ([address] COPD (chronic obstructive pulmonary disease) ([address] Fecal incontinence, Gallstones, Hyperlipidemia, Hyperplastic polyp of intestine, Hypertension, Psychiatric problem, and Seizures ([address] has a past surgical history that includes Hysterectomy; Colonoscopy (2017); and pr colsc flx w/removal lesion by hot bx forceps (N/A, 5/3/2017).     Restrictions  Restrictions/Precautions  Restrictions/Precautions: General Precautions, Contact Precautions, Fall Risk, Seizure (up with assistance)  Required Braces or Orthoses?: No  Implants present? :  (Pt denies)  Vision/Hearing  Vision: Within Functional Limits  Hearing: Within functional limits     Subjective  General  Chart Reviewed: Yes  Patient assessed for rehabilitation services?: Yes  Response To Previous Treatment: Not applicable (PT eval done today)  Family / Caregiver Present: No  Referring Practitioner: Kia Rivera M.D. Referral Date : 07/09/21  Diagnosis: hyponatremia  Follows Commands: Within Functional Limits  Subjective  Subjective: Pt stated, \" so so, I have been through hell. \"  Pain Screening  Patient Currently in Pain: Yes  Pain Assessment  Pain Assessment: 0-10  Pain Level: 4  Vital Signs  Pulse: 75  BP: (!) 163/60  BP Location: Right upper arm  Patient Position: Supine  Level of Consciousness: Alert (0)  Patient Currently in Pain: Yes  Pre Treatment Pain Screening  Pain at present: 4  Scale Used: Numeric Score  Intervention List: Patient able to continue with treatment    Orientation  Orientation  Overall Orientation Status: Impaired  Orientation Level: Disoriented to time;Oriented to place;Oriented to situation;Oriented to person  Social/Functional History  Social/Functional History  Lives With:  (sister)  Type of Home: House  Home Layout: One level  Home Access: Stairs to enter without rails  Entrance Stairs - Number of Steps: 3  Bathroom Shower/Tub: Walk-in shower  Bathroom Toilet: Standard  Bathroom Accessibility: Not accessible  Home Equipment: 4 wheeled walker  Receives Help From:  (sister)  ADL Assistance: Needs assistance  Homemaking Assistance:  (sister does all the cooking)  Ambulation Assistance: Independent (with 4 wheeled walker)  Transfer Assistance: Independent (with 4 wheeled walker)  Active : No (sister drives her)  Mode of Transportation: Van  Cognition   Cognition  Overall Cognitive Status: WFL    Objective          AROM RLE (degrees)  RLE AROM: WFL  AROM LLE (degrees)  LLE AROM : WFL  AROM RUE (degrees)  RUE AROM : Exceptions  R Shoulder Flexion 0-180: 0-100  AROM LUE (degrees)  LUE AROM : Exceptions  L Shoulder Flexion 0-180: 0-100  Strength RLE  Strength RLE: WFL  Comment: grossly 4-/5  Strength LLE  Strength LLE: WFL  Comment: grossly 4-/5  Strength RUE  Strength RUE: WFL  Comment: grossly 4/5  Strength LUE  Strength LUE: WFL  Comment: grossly 4/5     Sensation  Overall Sensation Status: WFL  Bed mobility  Bridging: Unable to assess  Rolling: minimal assistance  Supine to sit and sit to supine: maximal assistance  Scooting: Dependent/Total  Transfers  Sit to Stand:  (Pt deferred transfers and gt tng at this time )  Ambulation  Ambulation?: No  Stairs/Curb  Stairs?: No     Balance  Posture: Fair  Sitting - Static: Fair;-  Sitting - Dynamic: Fair;-  Standing - Static:  (pt deferred standing at this time)        Plan   Plan  Times per week: 5-7  Times per day: Daily  Plan weeks: 2  Specific instructions for Next Treatment: ther exs and ther activities as tolerated by pt  Current Treatment Recommendations: Strengthening, Balance Training, Functional Mobility Training  Safety Devices  Type of devices: Bed alarm in place, All fall risk precautions in place, Nurse notified, Left in bed                                   AM-PAC Score     AM-PAC Inpatient Mobility without Stair Climbing Raw Score : 8 (07/10/21 1625)  AM-PAC Inpatient without Stair Climbing T-Scale Score : 30.65 (07/10/21 1625)  Mobility Inpatient CMS 0-100% Score: 80.91 (07/10/21 1625)  Mobility Inpatient without Stair CMS G-Code Modifier : CM (07/10/21 1625)       Goals  Short term goals  Time Frame for Short term goals: 5 sessions  Short term goal 1: Improve strength in both LE to 4+/5  Short term goal 2:  Independent in bed mobility  Short term goal 3: Improve sitting balance to good  Short term goal 4: Assess transfers, standing and gait when pt is more agreeable and set appropriate goals  Patient Goals   Patient goals : not go back to staying with her sister       Therapy Time   Individual Concurrent Group Co-treatment   Time In 1433         Time Out [address], PT

## 2021-07-11 LAB
ANION GAP SERPL CALCULATED.3IONS-SCNC: 9 MMOL/L (ref 9–17)
BUN BLDV-MCNC: 14 MG/DL (ref 8–23)
BUN/CREAT BLD: ABNORMAL (ref 9–20)
CALCIUM SERPL-MCNC: 8.6 MG/DL (ref 8.6–10.4)
CHLORIDE BLD-SCNC: 99 MMOL/L (ref 98–107)
CO2: 24 MMOL/L (ref 20–31)
CREAT SERPL-MCNC: 0.76 MG/DL (ref 0.5–0.9)
GFR AFRICAN AMERICAN: >60 ML/MIN
GFR NON-AFRICAN AMERICAN: >60 ML/MIN
GFR SERPL CREATININE-BSD FRML MDRD: ABNORMAL ML/MIN/{1.73_M2}
GFR SERPL CREATININE-BSD FRML MDRD: ABNORMAL ML/MIN/{1.73_M2}
GLUCOSE BLD-MCNC: 96 MG/DL (ref 70–99)
HCT VFR BLD CALC: 36 % (ref 36–46)
HEMOGLOBIN: 12 G/DL (ref 12–16)
MAGNESIUM: 2 MG/DL (ref 1.6–2.6)
MCH RBC QN AUTO: 30.6 PG (ref 26–34)
MCHC RBC AUTO-ENTMCNC: 33.4 G/DL (ref 31–37)
MCV RBC AUTO: 91.6 FL (ref 80–100)
NRBC AUTOMATED: ABNORMAL PER 100 WBC
PDW BLD-RTO: 13.4 % (ref 11.5–14.9)
PLATELET # BLD: 211 K/UL (ref 150–450)
PMV BLD AUTO: 7.2 FL (ref 6–12)
POTASSIUM SERPL-SCNC: 4.3 MMOL/L (ref 3.7–5.3)
RBC # BLD: 3.92 M/UL (ref 4–5.2)
SODIUM BLD-SCNC: 130 MMOL/L (ref 135–144)
SODIUM BLD-SCNC: 130 MMOL/L (ref 135–144)
SODIUM BLD-SCNC: 132 MMOL/L (ref 135–144)
SODIUM BLD-SCNC: 132 MMOL/L (ref 135–144)
WBC # BLD: 5.4 K/UL (ref 3.5–11)

## 2021-07-11 PROCEDURE — 97166 OT EVAL MOD COMPLEX 45 MIN: CPT

## 2021-07-11 PROCEDURE — 83735 ASSAY OF MAGNESIUM: CPT

## 2021-07-11 PROCEDURE — 84295 ASSAY OF SERUM SODIUM: CPT

## 2021-07-11 PROCEDURE — 6360000002 HC RX W HCPCS: Performed by: NURSE PRACTITIONER

## 2021-07-11 PROCEDURE — 2580000003 HC RX 258: Performed by: INTERNAL MEDICINE

## 2021-07-11 PROCEDURE — 2500000003 HC RX 250 WO HCPCS: Performed by: NURSE PRACTITIONER

## 2021-07-11 PROCEDURE — 97535 SELF CARE MNGMENT TRAINING: CPT

## 2021-07-11 PROCEDURE — 80048 BASIC METABOLIC PNL TOTAL CA: CPT

## 2021-07-11 PROCEDURE — 2580000003 HC RX 258: Performed by: NURSE PRACTITIONER

## 2021-07-11 PROCEDURE — 6370000000 HC RX 637 (ALT 250 FOR IP): Performed by: INTERNAL MEDICINE

## 2021-07-11 PROCEDURE — 6370000000 HC RX 637 (ALT 250 FOR IP): Performed by: NURSE PRACTITIONER

## 2021-07-11 PROCEDURE — 36415 COLL VENOUS BLD VENIPUNCTURE: CPT

## 2021-07-11 PROCEDURE — 1200000000 HC SEMI PRIVATE

## 2021-07-11 PROCEDURE — 85027 COMPLETE CBC AUTOMATED: CPT

## 2021-07-11 PROCEDURE — 99232 SBSQ HOSP IP/OBS MODERATE 35: CPT | Performed by: INTERNAL MEDICINE

## 2021-07-11 RX ADMIN — ACETAMINOPHEN 650 MG: 325 TABLET ORAL at 15:16

## 2021-07-11 RX ADMIN — MORPHINE SULFATE 4 MG: 4 INJECTION, SOLUTION INTRAMUSCULAR; INTRAVENOUS at 08:04

## 2021-07-11 RX ADMIN — MORPHINE SULFATE 2 MG: 2 INJECTION, SOLUTION INTRAMUSCULAR; INTRAVENOUS at 04:26

## 2021-07-11 RX ADMIN — FAMOTIDINE 20 MG: 10 INJECTION, SOLUTION INTRAVENOUS at 22:29

## 2021-07-11 RX ADMIN — ACETAMINOPHEN 650 MG: 325 TABLET ORAL at 08:03

## 2021-07-11 RX ADMIN — FAMOTIDINE 20 MG: 10 INJECTION, SOLUTION INTRAVENOUS at 08:05

## 2021-07-11 RX ADMIN — ENOXAPARIN SODIUM 30 MG: 30 INJECTION SUBCUTANEOUS at 08:03

## 2021-07-11 RX ADMIN — ACETAMINOPHEN 650 MG: 325 TABLET ORAL at 22:29

## 2021-07-11 RX ADMIN — HYDRALAZINE HYDROCHLORIDE 25 MG: 25 TABLET, FILM COATED ORAL at 05:58

## 2021-07-11 RX ADMIN — HYDRALAZINE HYDROCHLORIDE 25 MG: 25 TABLET, FILM COATED ORAL at 22:29

## 2021-07-11 RX ADMIN — OXCARBAZEPINE 300 MG: 300 TABLET, FILM COATED ORAL at 08:08

## 2021-07-11 RX ADMIN — SODIUM CHLORIDE, PRESERVATIVE FREE 10 ML: 5 INJECTION INTRAVENOUS at 19:56

## 2021-07-11 RX ADMIN — HYDRALAZINE HYDROCHLORIDE 25 MG: 25 TABLET, FILM COATED ORAL at 15:16

## 2021-07-11 RX ADMIN — SODIUM CHLORIDE, PRESERVATIVE FREE 10 ML: 5 INJECTION INTRAVENOUS at 08:09

## 2021-07-11 RX ADMIN — OXCARBAZEPINE 300 MG: 300 TABLET, FILM COATED ORAL at 22:30

## 2021-07-11 RX ADMIN — MORPHINE SULFATE 2 MG: 2 INJECTION, SOLUTION INTRAMUSCULAR; INTRAVENOUS at 19:53

## 2021-07-11 RX ADMIN — ONDANSETRON 4 MG: 2 INJECTION INTRAMUSCULAR; INTRAVENOUS at 08:04

## 2021-07-11 RX ADMIN — ENOXAPARIN SODIUM 30 MG: 30 INJECTION SUBCUTANEOUS at 22:29

## 2021-07-11 ASSESSMENT — PAIN DESCRIPTION - LOCATION
LOCATION: BACK
LOCATION: ABDOMEN
LOCATION: ABDOMEN
LOCATION: BACK
LOCATION: ABDOMEN
LOCATION: BACK
LOCATION: ABDOMEN
LOCATION: ABDOMEN

## 2021-07-11 ASSESSMENT — PAIN DESCRIPTION - PAIN TYPE
TYPE: ACUTE PAIN
TYPE: ACUTE PAIN
TYPE: CHRONIC PAIN
TYPE: ACUTE PAIN
TYPE: ACUTE PAIN
TYPE: CHRONIC PAIN
TYPE: ACUTE PAIN
TYPE: CHRONIC PAIN

## 2021-07-11 ASSESSMENT — PAIN DESCRIPTION - ORIENTATION: ORIENTATION: MID;UPPER

## 2021-07-11 ASSESSMENT — PAIN DESCRIPTION - DESCRIPTORS
DESCRIPTORS: ACHING

## 2021-07-11 ASSESSMENT — PAIN SCALES - GENERAL
PAINLEVEL_OUTOF10: 3
PAINLEVEL_OUTOF10: 0
PAINLEVEL_OUTOF10: 7
PAINLEVEL_OUTOF10: 3
PAINLEVEL_OUTOF10: 7
PAINLEVEL_OUTOF10: 0
PAINLEVEL_OUTOF10: 7
PAINLEVEL_OUTOF10: 9
PAINLEVEL_OUTOF10: 10

## 2021-07-11 NOTE — PROGRESS NOTES
[facility]    Occupational Therapy Evaluation  Date: 21  Patient Name: Shira Nicholson       Room: 4862/8564-48  MRN: [id number]  Account: [de-identified]   : 1950  (71 y.o.) Gender: female     Discharge Recommendations:  Further Occupational Therapy is recommended upon facility discharge. OT Equipment Recommendations  Equipment Needed: No  Other: plans to go to facility       Diagnosis: admitted 21 with abdominal pain chest pain CT scan showed porcelain gallbladder but nil acute, Hyponatremia - Patient is clinically euvolemic and hyponatremia in this patient is consistent with SIADH. systemic hypertension          Past Medical History:  has a past medical history of Bowel obstruction ([address] [address] COPD (chronic obstructive pulmonary disease) ([facility] [address] Fecal incontinence, Gallstones, Hyperlipidemia, Hyperplastic polyp of intestine, Hypertension, Psychiatric problem, and Seizures ([facility] [address] Past Surgical History:   has a past surgical history that includes Hysterectomy; Colonoscopy (2017); and pr colsc flx w/removal lesion by hot bx forceps (N/A, 5/3/2017). Restrictions  Restrictions/Precautions: General Precautions, Contact Precautions, Fall Risk, Seizure  Implants present? :  (Pt denies)  Other position/activity restrictions: fluid restriction 1500 mL/day. Required Braces or Orthoses?: No     Vitals  Temp: 98.3 °F (36.8 °C)  Pulse: 95  Resp: 18  BP: (!) 157/53  Height: 5' 6\" (167.6 cm)  Weight: 227 lb 8.2 oz (103.2 kg)  BMI (Calculated): 36.8  Oxygen Therapy  SpO2: 95 %  Pulse Oximeter Device Mode: Intermittent  Pulse Oximeter Device Location: Finger  O2 Device: None (Room air)  O2 Flow Rate (L/min): 0 L/min  Level of Consciousness: Alert (0)    Subjective  Subjective: pt initiates verbalizations- not all are relevant ie. \"They were good to me, I was good to them. \"  when asked whom, states \"People in the town. \"  where she lived.   Comments: pt has involuntary tongue movement with tongue routinely protruding from lips then retracted at steady rythym. Overall Orientation Status: Within Functional Limits  Vision  Vision: Within Functional Limits  Hearing  Hearing: Within functional limits  Social/Functional History  Lives With:  (sister)  Type of Home: House  Home Layout: One level  Home Access: Stairs to enter without rails  Entrance Stairs - Number of Steps: 3  Bathroom Shower/Tub: Walk-in shower  Bathroom Toilet: Standard  Bathroom Accessibility: Not accessible  Home Equipment: 4 wheeled walker  Receives Help From:  (sister)  ADL Assistance: Needs assistance  Homemaking Assistance:  (sister does all the cooking)  Ambulation Assistance: Independent (with 4 wheeled walker)  Transfer Assistance: Independent (with 4 wheeled walker)  Active : No (sister drives her)  Mode of Transportation: [employer]  IADL Comments: uncertain re pt's prior level of function as pt gives different answers to same questions. she states she was indep walking in house, toileting and self care. (pt with very matted hair center back)   sister cooked. Additional Comments: per chart and per pt, pt is unhappy with her sister and is requesting ECF placement. pt has a legal guardian per chart. Objective      Cognition  Overall Cognitive Status: [facility]  Cognition Comment: fair for basic level tasks, seems at baseline,   pt gives different answers to same questions re prior level of function . pt initiates verbalizations- not all are relevant,  needs some redirection to focus       ADL  Feeding: Setup  Grooming: Setup, Minimal assistance (pt with tangled, matted hair center back- would only be able to brush around this.)  UE Bathing: Setup, Minimal assistance  LE Bathing: Maximum assistance  UE Dressing: Setup, Moderate assistance  LE Dressing: Maximum assistance  Toileting: Maximum assistance  Additional Comments: pt supine to sit, required min- mod A to cross to reach BLE for adls.     UE Function           LUE Strength  Gross LUE Strength: Exceptions to [facility]  L Shoulder Flex: 4-/5           LUE AROM (degrees)  LUE AROM : Exceptions  L Shoulder Flexion 0-180: 0-110 A and P        RUE Strength  Gross RUE Strength: Exceptions to [facility]  R Shoulder Flex: 4-/5            RUE AROM (degrees)  RUE AROM : WFL          Fine Motor Skills  Coordination  Movements Are Fluid And Coordinated: Yes                           Mobility  Supine to Sit: Contact guard assistance  Sit to Supine: Minimal assistance       Balance  Sitting Balance: Supervision (lalita 18 min with good balance- static without UE support needed)  Standing Balance: Minimal assistance (min- CGA with BUE support)  Standing Balance  Time: 3 min x 2  Activity: static stand  with BUE support  Comment: pt felt unsafe to shift weight , side step or march in place, nsg was notified re if want to transfer to try rosina weiss. Bed mobility  Bridging: Independent (limited bridge to shift hips)  Supine to Sit: Contact guard assistance  Sit to Supine: Minimal assistance     Transfers  Stand Step Transfers: Unable to assess  Stand Pivot Transfers: Unable to assess  Sit to stand: Moderate assistance (min- mod)  Stand to sit: Minimal assistance      Assessment  Performance deficits / Impairments: Decreased ADL status, Decreased balance, Decreased strength, Decreased endurance, Decreased functional mobility   Prognosis: Good  Decision Making: Medium Complexity  History: admitted 7-7-21 with abdominal pain chest pain CT scan showed porcelain gallbladder but nil acute, Hyponatremia - Patient is clinically euvolemic and hyponatremia in this patient is consistent with SIADH.   systemic hypertension  Exam: 5 performance deficits  Assistance / Modification: mod  REQUIRES OT FOLLOW UP: Yes  Activity Tolerance: Patient Tolerated treatment well                   Goals  Patient Goals   Patient goals : resume ability to walk  Short term goals  Time Frame for Short term goals: 1 week  Short term goal 1: lalita 6-8 min stand with BUE support and SBA for adls  Short term goal 2: min of 2 adl transfers with RW  Short term goal 3: set up UE bathe and dress  Short term goal 4: min LE bathe and dress  Short term goal 5: lalita 10 reps x 4 BUE shoulder AROM with mod resistance for work lalita, strength    [address] in place: Yes  Type of devices: Bed alarm in place, Left in bed, Call light within reach     Plan  Times per week: 4-5  Times per day: Daily  Current Treatment Recommendations: Strengthening, Safety Education & Training, Balance Training, Patient/Caregiver Education & Training, Self-Care / ADL, Functional Mobility Training, Endurance Training  OT Education  OT Education: OT Role, Plan of Care  Patient Education: activity promotion    OT Equipment Recommendations  Equipment Needed: No  Other: plans to go to facility  OT Individual Minutes  Time In: 7842  Time Out: 4573  Minutes: 32    Electronically signed by Angy Mesa OT on 7/11/21 at 4:13 PM EDT

## 2021-07-11 NOTE — CARE COORDINATION
DISCHARGE PLANNING NOTE:    ONGOING DISCHARGE PLANNING NOTE:    Writer reviewed LSW notes, and discharge plan is [facility] [place]. A precert is required and PT/OT is ordered.     Electronically signed by Elizabeth Franco RN on 7/11/2021 at 9:09 AM

## 2021-07-11 NOTE — PROGRESS NOTES
Dr Grayson Corley notified of needing an order to discontinue telemetry,  Pt.  Also refuses telemetry at this time

## 2021-07-11 NOTE — PROGRESS NOTES
[place] [address] Internal Medicine    Progress Note     7/11/2021    12:20 PM    Name:   Suzy Gifford  MRN:     [id number]     Acct:      [de-identified]   Room:   [facility] Day:  4  Admit Date:  7/7/2021  6:15 PM    PCP:   ANDRE Laws CNP  Code Status:  Full Code    Subjective:     C/C:   Chief Complaint   Patient presents with    Abdominal Pain     x1 month     Principal Problem:    Hyponatremia  Active Problems:    History of gallstones    Peptic ulcer    Seizures ([address]    History of small bowel obstruction    Chronic abdominal pain  Resolved Problems:    * No resolved hospital problems.  *        Significant last 24 hr data reviewed ;   Vitals:    07/10/21 2000 07/10/21 2246 07/11/21 0545 07/11/21 0715   BP: (!) 152/55 (!) 145/66 (!) 175/64 132/68   Pulse: 70  63 78   Resp: 20  20 18   Temp: 98.2 °F (36.8 °C)  98.4 °F (36.9 °C) 97.2 °F (36.2 °C)   TempSrc:   Oral Oral   SpO2: 96%  95% 95%   Weight:    227 lb 8.2 oz (103.2 kg)   Height:          Recent Results (from the past 24 hour(s))   NA (Sodium)    Collection Time: 07/10/21  6:24 PM   Result Value Ref Range    Sodium 130 (L) 135 - 144 mmol/L   NA (Sodium)    Collection Time: 07/11/21  2:55 AM   Result Value Ref Range    Sodium 132 (L) 135 - 144 mmol/L   Basic Metabolic Panel w/ Reflex to MG    Collection Time: 07/11/21  5:39 AM   Result Value Ref Range    Glucose 96 70 - 99 mg/dL    BUN 14 8 - 23 mg/dL    CREATININE 0.76 0.50 - 0.90 mg/dL    Bun/Cre Ratio NOT REPORTED 9 - 20    Calcium 8.6 8.6 - 10.4 mg/dL    Sodium 132 (L) 135 - 144 mmol/L    Potassium 4.3 3.7 - 5.3 mmol/L    Chloride 99 98 - 107 mmol/L    CO2 24 20 - 31 mmol/L    Anion Gap 9 9 - 17 mmol/L    GFR Non-African American >60 >60 mL/min    GFR African American >60 >60 mL/min    GFR Comment          GFR Staging NOT REPORTED    CBC    Collection Time: 07/11/21  5:39 AM   Result Value Ref Range    WBC 5.4 3.5 - 11.0 k/uL    RBC 3.92 (L) 4.0 - 5.2 m/uL Hemoglobin 12.0 12.0 - 16.0 g/dL    Hematocrit 36.0 36 - 46 %    MCV 91.6 80 - 100 fL    MCH 30.6 26 - 34 pg    MCHC 33.4 31 - 37 g/dL    RDW 13.4 11.5 - 14.9 %    Platelets 414 890 - 515 k/uL    MPV 7.2 6.0 - 12.0 fL    NRBC Automated NOT REPORTED per 100 WBC   Magnesium    Collection Time: 07/11/21  5:39 AM   Result Value Ref Range    Magnesium 2.0 1.6 - 2.6 mg/dL     No results for input(s): POCGLU in the last 72 hours. CT ABDOMEN PELVIS W IV CONTRAST Additional Contrast? None    Result Date: 7/7/2021  EXAMINATION: CT OF THE ABDOMEN AND PELVIS WITH CONTRAST 7/7/2021 7:51 pm TECHNIQUE: CT of the abdomen and pelvis was performed with the administration of intravenous contrast. Multiplanar reformatted images are provided for review. Dose modulation, iterative reconstruction, and/or weight based adjustment of the mA/kV was utilized to reduce the radiation dose to as low as reasonably achievable. COMPARISON: 04/19/2017 HISTORY: ORDERING SYSTEM PROVIDED HISTORY: Abdo pain, hx SBO TECHNOLOGIST PROVIDED HISTORY: Abdo pain, hx SBO Decision Support Exception - unselect if not a suspected or confirmed emergency medical condition->Emergency Medical Condition (MA) Reason for Exam: pt c/o abd pain x 1 month, pt unable to raise arms above head for CT exam. FINDINGS: Lower Chest: Heart size is normal.  Three-vessel calcific coronary artery disease. Small hiatal hernia. Lung bases are clear. Organs: Cholelithiasis with porcelain gallbladder. No pericholecystic fluid fat stranding. No change from prior study. The liver, spleen, pancreas and kidneys are normal.  Small nodular contour of the bilateral adrenal glands, unchanged. GI/Bowel: Interval partial sigmoidectomy. A few diverticula remain proximal to the sigmoid anastomosis. No evidence of diverticulitis. The appendix is normal.  No bowel obstruction. Pelvis: Urinary bladder is unremarkable. The uterus is surgically absent.  Peritoneum/Retroperitoneum: There is no adenopathy, free air or free fluid. There is a retroaortic left renal vein. Calcific aorto iliac atherosclerotic disease with no evidence of an aneurysm. Bones/Soft Tissues: Unchanged/remote compression fractures of T12 and L5. No acute bone finding. No acute finding in the abdomen or pelvis. Cholelithiasis with porcelain gallbladder. No pericholecystic fluid or fat stranding to suggest acute cholecystitis. Interval partial sigmoidectomy. Few diverticular main proximal to the sigmoid anastomosis with no evidence of diverticulitis. Unchanged/remote T12 and L5 compression fractures. CT CHEST PULMONARY EMBOLISM W CONTRAST    Result Date: 7/8/2021  EXAMINATION: CTA OF THE CHEST 7/8/2021 11:13 am TECHNIQUE: CTA of the chest was performed after the administration of intravenous contrast.  Multiplanar reformatted images are provided for review. MIP images are provided for review. Dose modulation, iterative reconstruction, and/or weight based adjustment of the mA/kV was utilized to reduce the radiation dose to as low as reasonably achievable. COMPARISON: CT abdomen and pelvis yesterday. HISTORY: ORDERING SYSTEM PROVIDED HISTORY: chest pain rule out aaa ,pe TECHNOLOGIST PROVIDED HISTORY: chest pain rule out aaa ,pe Reason for Exam: chest pain rule out aaa ,pe Acuity: Unknown Type of Exam: Unknown Additional signs and symptoms: pt states chest/stomach pain Relevant Medical/Surgical History: no surgeries to area FINDINGS: Pulmonary Arteries: Pulmonary arteries are adequately opacified for evaluation. No evidence of intraluminal filling defect to suggest pulmonary embolism. Main pulmonary artery is normal in caliber. Mediastinum: No evidence of mediastinal lymphadenopathy. The heart and pericardium demonstrate no acute abnormality. There is no acute abnormality of the thoracic aorta. Lungs/pleura: No acute airspace disease identified.   Mosaic attenuation of the lung parenchyma, likely due to regional air trapping. Sequela of old granulomatous disease. No effusion. The central airway is patent. Upper Abdomen: Cholelithiasis. Small hiatal hernia. Soft Tissues/Bones: No acute findings. Chronic T12 compression deformity. 1.  No evidence of pulmonary embolism or acute pulmonary abnormality. 2.  No thoracic aortic aneurysm or acute aortic abnormality appreciated. 3.  Cholelithiasis. HPI:     Overnight no fever chills no nausea vomiting tolerating diet denies any chest pain abdominal pain patient is refusing to go back to live with her sister wants a place to be found for her    Review of Systems:     Constitutional:  negative for chills, fevers, sweats  Respiratory:  negative for cough, dyspnea on exertion, hemoptysis, shortness of breath, wheezing  Cardiovascular:  negative for chest pain, chest pressure/discomfort, lower extremity edema, palpitations  Gastrointestinal:  negative for abdominal pain, constipation, diarrhea, nausea, vomiting  Neurological:  negative for dizziness, headache  Data:     Past Medical History:  no change     Social History:  no change    Family History: @no change    Vitals:      I/O (24Hr):     Intake/Output Summary (Last 24 hours) at 7/11/2021 1220  Last data filed at 7/11/2021 0511  Gross per 24 hour   Intake --   Output 800 ml   Net -800 ml       Labs:    URINE ANALYSIS: No results found for: LABURIN     CBC:  Lab Results   Component Value Date    WBC 5.4 07/11/2021    HGB 12.0 07/11/2021     07/11/2021     05/04/2012        BMP:    Lab Results   Component Value Date     07/11/2021    K 4.3 07/11/2021    CL 99 07/11/2021    CO2 24 07/11/2021    BUN 14 07/11/2021    CREATININE 0.76 07/11/2021    GLUCOSE 96 07/11/2021    GLUCOSE 96 05/04/2012      LIVER PROFILE:  Lab Results   Component Value Date    ALT 9 07/07/2021    AST 15 07/07/2021    PROT 7.3 07/07/2021    BILITOT 0.41 07/07/2021    BILIDIR <0.08 10/16/2015    LABALBU 4.4 07/07/2021    LABALBU 3.9 2012               Radiology:  Medications: Allergies:      Current Meds:   Scheduled Meds:    enoxaparin  30 mg Subcutaneous BID    hydrALAZINE  25 mg Oral 3 times per day    sodium chloride flush  5-40 mL Intravenous 2 times per day    famotidine (PEPCID) injection  20 mg Intravenous BID    OXcarbazepine  300 mg Oral BID     Continuous Infusions:    sodium chloride       PRN Meds: sodium chloride flush, sodium chloride flush, sodium chloride flush, sodium chloride, potassium chloride **OR** potassium alternative oral replacement **OR** potassium chloride, magnesium sulfate, polyethylene glycol, acetaminophen **OR** acetaminophen, ondansetron, morphine **OR** morphine      Physical Examination:        /68   Pulse 78   Temp 97.2 °F (36.2 °C) (Oral)   Resp 18   Ht 5' 6\" (1.676 m)   Wt 227 lb 8.2 oz (103.2 kg)   SpO2 95%   BMI 36.72 kg/m²   Temp (24hrs), Av.8 °F (36.6 °C), Min:97.2 °F (36.2 °C), Max:98.4 °F (36.9 °C)    No results for input(s): POCGLU in the last 72 hours. Intake/Output Summary (Last 24 hours) at 2021 1220  Last data filed at 2021 0511  Gross per 24 hour   Intake --   Output 800 ml   Net -800 ml       General Appearance:  alert, well appearing, and in no acute distress  Mental status:   Head:  normocephalic, atraumatic. Eye: no icterus, redness, pupils equal and reactive, extraocular eye movements intact, conjunctiva clear  Ear: normal external ear, no discharge, hearing intact  Nose:  no drainage noted  Mouth: mucous membranes moist  No teeth and mouth  Neck: supple, no carotid bruits, thyroid not palpable  Lungs: Bilateral equal air entry, clear to ausculation, no wheezing, rales or rhonchi, normal effort  Cardiovascular: normal rate, regular rhythm, no murmur, gallop, rub.   Abdomen: Soft, nontender, nondistended, normal bowel sounds, no hepatomegaly or splenomegaly  Neurologic: There are no new focal motor or sensory deficits,  Frequent tongues and lip smacking likely part of Parkinson's disease  Skin: No gross lesions, rashes, bruising or bleeding on exposed skin area  Extremities:  peripheral pulses palpable, no pedal edema or calf pain with palpation              Assessment:        Primary Problem  Hyponatremia    Active Hospital Problems    Diagnosis Date Noted    History of small bowel obstruction [Z87.19] 07/08/2021    Chronic abdominal pain [R10.9, G89.29] 07/08/2021    Hyponatremia [E87.1] 07/07/2021    Seizures ([address] [R56.9] 12/19/2011    History of gallstones [Z87.19] 12/19/2011    Peptic ulcer [K27.9] 12/19/2011     Plan:        75-year-old morbidly obese lady class II BMI 28 lives with a sister admitted with abdominal pain chest pain CT scan showed porcelain gallbladder but nil acute  Routine labs revealed severe hyponatremia 118 likely secondary to decreased oral intake check for urine plasma osmolality urine sodium gentle hydration nephrology input requested  We will have the staff call the family patient lives with sister and has Parkinson's disease poor historian  Chest pain atypical in nature 4 months will order CTA rule out PE or dissection will review with results             July 11  Hyponatremia is improving  Abdominal chest pain resolved CTA chest no dissection no PE noted  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

## 2021-07-11 NOTE — PROGRESS NOTES
Department of Internal Medicine  Nephrology Daniel Leigh MD  Progress Note    Reason for consultation: Management of hyponatremia. Consulting physician: Shaan Lucas. MD Nina.    Interval history: Patient was seen and examined today and she does not have any new complaints. She is nonoliguric and now has an external catheter in place. History of present illness: This is a 65 y.o. female with a significant past medical history of Bipolar disorder, hyperlipidemia, chronic obstructive pulmonary disease and systemic hypertension, who presented to the emergency department yesterday with complaints of Acute on chronic abdominal pain of 4 weeks duration. There was no associated fever, chills, nausea, vomiting, constipation or diarrhea. She did not have any seizures. Laboratory studies were remarkable for serum sodium 119 mmol/L and hence nephrology consultation. Home medications Trileptal and Keppra.      Scheduled Meds:   enoxaparin  30 mg Subcutaneous BID    hydrALAZINE  25 mg Oral 3 times per day    sodium chloride flush  5-40 mL Intravenous 2 times per day    famotidine (PEPCID) injection  20 mg Intravenous BID    OXcarbazepine  300 mg Oral BID     Continuous Infusions:   sodium chloride       PRN Meds:.sodium chloride flush, sodium chloride flush, sodium chloride flush, sodium chloride, potassium chloride **OR** potassium alternative oral replacement **OR** potassium chloride, magnesium sulfate, polyethylene glycol, acetaminophen **OR** acetaminophen, ondansetron, morphine **OR** morphine    Physical Exam:    VITALS:  /68   Pulse 78   Temp 97.2 °F (36.2 °C) (Oral)   Resp 18   Ht 5' 6\" (1.676 m)   Wt 227 lb 8.2 oz (103.2 kg)   SpO2 95%   BMI 36.72 kg/m²   24HR INTAKE/OUTPUT:      Intake/Output Summary (Last 24 hours) at 7/11/2021 1129  Last data filed at 7/11/2021 0511  Gross per 24 hour   Intake --   Output 800 ml   Net -800 ml     Constitutional: alert, appears stated age and cooperative    Skin: No rash but evidence of hirsutism. Head: Normocephalic, without obvious abnormality, atraumatic     Cardiovascular/Edema: regular rate and rhythm, S1, S2 normal, no murmur, click, rub or gallop    Respiratory: Lungs: clear to auscultation bilaterally    Abdomen: soft, non-tender; bowel sounds normal; no masses,  no organomegaly    Back: symmetric, no curvature. ROM normal. No CVA tenderness. Extremities: extremities normal, atraumatic, no cyanosis or edema    Neuro:  Involuntary movements especially of the muscles of mastication consistent with tardive dyskinesia    CBC:   Recent Labs     07/09/21  0522 07/10/21  0558 07/11/21  0539   WBC 6.1 6.8 5.4   HGB 11.4* 13.1 12.0    240 211     BMP:    Recent Labs     07/09/21 0522 07/09/21  1347 07/10/21  0558 07/10/21  0558 07/10/21  1824 07/11/21  0255 07/11/21  0539   *   < > 131*   < > 130* 132* 132*   K 4.1  --  4.2  --   --   --  4.3     --  98  --   --   --  99   CO2 22  --  23  --   --   --  24   BUN 13  --  10  --   --   --  14   CREATININE 0.74  --  0.70  --   --   --  0.76   GLUCOSE 88  --  100*  --   --   --  96    < > = values in this interval not displayed. Lab Results   Component Value Date    NITRU NEGATIVE 07/07/2021    COLORU YELLOW 07/07/2021    PHUR 6.5 07/07/2021    WBCUA 0 TO 2 07/07/2021    RBCUA 0 TO 2 07/07/2021    MUCUS NOT REPORTED 07/07/2021    TRICHOMONAS NOT REPORTED 07/07/2021    YEAST NOT REPORTED 07/07/2021    BACTERIA FEW 07/07/2021    SPECGRAV 1.049 07/07/2021    LEUKOCYTESUR NEGATIVE 07/07/2021    UROBILINOGEN Normal 07/07/2021    BILIRUBINUR NEGATIVE 07/07/2021    BILIRUBINUR NEGATIVE 05/04/2012    GLUCOSEU NEGATIVE 07/07/2021    GLUCOSEU NEGATIVE 05/04/2012    KETUA NEGATIVE 07/07/2021    AMORPHOUS NOT REPORTED 07/07/2021     IMPRESSION/RECOMMENDATIONS:      1. Hyponatremia - Patient is clinically euvolemic and hyponatremia in this patient is consistent with SIADH.   This may be secondary to Oxcarbazepine[Trileptal]. Serum sodium is stable at 132 mmol/L. Plan: Continue fluid restriction 1500 mL/day. Check serum sodium level every 8 hours with target rate of correction of sodium not to exceed 0.5 mmol/L/h [maximum 8 mmol over 24 hours]. 2.  Systemic hypertension - Continue hydralazine 25 mg p.o. 3 times daily. Prognosis is guarded.     Ariana De León MD FACP  Attending Nephrologist  7/11/2021 11:29 AM

## 2021-07-12 LAB
ANION GAP SERPL CALCULATED.3IONS-SCNC: 8 MMOL/L (ref 9–17)
BUN BLDV-MCNC: 18 MG/DL (ref 8–23)
BUN/CREAT BLD: ABNORMAL (ref 9–20)
CALCIUM SERPL-MCNC: 8.7 MG/DL (ref 8.6–10.4)
CHLORIDE BLD-SCNC: 96 MMOL/L (ref 98–107)
CO2: 24 MMOL/L (ref 20–31)
CREAT SERPL-MCNC: 0.73 MG/DL (ref 0.5–0.9)
GFR AFRICAN AMERICAN: >60 ML/MIN
GFR NON-AFRICAN AMERICAN: >60 ML/MIN
GFR SERPL CREATININE-BSD FRML MDRD: ABNORMAL ML/MIN/{1.73_M2}
GFR SERPL CREATININE-BSD FRML MDRD: ABNORMAL ML/MIN/{1.73_M2}
GLUCOSE BLD-MCNC: 95 MG/DL (ref 70–99)
HCT VFR BLD CALC: 36 % (ref 36–46)
HEMOGLOBIN: 12 G/DL (ref 12–16)
MAGNESIUM: 2.1 MG/DL (ref 1.6–2.6)
MCH RBC QN AUTO: 30.9 PG (ref 26–34)
MCHC RBC AUTO-ENTMCNC: 33.5 G/DL (ref 31–37)
MCV RBC AUTO: 92.4 FL (ref 80–100)
NRBC AUTOMATED: ABNORMAL PER 100 WBC
PDW BLD-RTO: 13.5 % (ref 11.5–14.9)
PLATELET # BLD: 215 K/UL (ref 150–450)
PMV BLD AUTO: 6.4 FL (ref 6–12)
POTASSIUM SERPL-SCNC: 4.3 MMOL/L (ref 3.7–5.3)
RBC # BLD: 3.9 M/UL (ref 4–5.2)
SODIUM BLD-SCNC: 127 MMOL/L (ref 135–144)
SODIUM BLD-SCNC: 128 MMOL/L (ref 135–144)
SODIUM BLD-SCNC: 131 MMOL/L (ref 135–144)
WBC # BLD: 4.3 K/UL (ref 3.5–11)

## 2021-07-12 PROCEDURE — 2500000003 HC RX 250 WO HCPCS: Performed by: NURSE PRACTITIONER

## 2021-07-12 PROCEDURE — 6370000000 HC RX 637 (ALT 250 FOR IP): Performed by: NURSE PRACTITIONER

## 2021-07-12 PROCEDURE — 6370000000 HC RX 637 (ALT 250 FOR IP): Performed by: INTERNAL MEDICINE

## 2021-07-12 PROCEDURE — 83735 ASSAY OF MAGNESIUM: CPT

## 2021-07-12 PROCEDURE — 36415 COLL VENOUS BLD VENIPUNCTURE: CPT

## 2021-07-12 PROCEDURE — 84295 ASSAY OF SERUM SODIUM: CPT

## 2021-07-12 PROCEDURE — 6360000002 HC RX W HCPCS: Performed by: NURSE PRACTITIONER

## 2021-07-12 PROCEDURE — 80048 BASIC METABOLIC PNL TOTAL CA: CPT

## 2021-07-12 PROCEDURE — 2580000003 HC RX 258: Performed by: NURSE PRACTITIONER

## 2021-07-12 PROCEDURE — 99232 SBSQ HOSP IP/OBS MODERATE 35: CPT | Performed by: INTERNAL MEDICINE

## 2021-07-12 PROCEDURE — 85027 COMPLETE CBC AUTOMATED: CPT

## 2021-07-12 PROCEDURE — 1200000000 HC SEMI PRIVATE

## 2021-07-12 RX ADMIN — FAMOTIDINE 20 MG: 10 INJECTION, SOLUTION INTRAVENOUS at 08:31

## 2021-07-12 RX ADMIN — MORPHINE SULFATE 4 MG: 4 INJECTION, SOLUTION INTRAMUSCULAR; INTRAVENOUS at 04:29

## 2021-07-12 RX ADMIN — HYDRALAZINE HYDROCHLORIDE 25 MG: 25 TABLET, FILM COATED ORAL at 19:35

## 2021-07-12 RX ADMIN — HYDRALAZINE HYDROCHLORIDE 25 MG: 25 TABLET, FILM COATED ORAL at 06:34

## 2021-07-12 RX ADMIN — MORPHINE SULFATE 4 MG: 4 INJECTION, SOLUTION INTRAMUSCULAR; INTRAVENOUS at 19:29

## 2021-07-12 RX ADMIN — ENOXAPARIN SODIUM 30 MG: 30 INJECTION SUBCUTANEOUS at 19:34

## 2021-07-12 RX ADMIN — ACETAMINOPHEN 650 MG: 325 TABLET ORAL at 08:31

## 2021-07-12 RX ADMIN — ACETAMINOPHEN 650 MG: 325 TABLET ORAL at 22:40

## 2021-07-12 RX ADMIN — OXCARBAZEPINE 300 MG: 300 TABLET, FILM COATED ORAL at 08:31

## 2021-07-12 RX ADMIN — ENOXAPARIN SODIUM 30 MG: 30 INJECTION SUBCUTANEOUS at 08:31

## 2021-07-12 RX ADMIN — MORPHINE SULFATE 4 MG: 4 INJECTION, SOLUTION INTRAMUSCULAR; INTRAVENOUS at 11:59

## 2021-07-12 RX ADMIN — SODIUM CHLORIDE, PRESERVATIVE FREE 10 ML: 5 INJECTION INTRAVENOUS at 08:32

## 2021-07-12 RX ADMIN — OXCARBAZEPINE 300 MG: 300 TABLET, FILM COATED ORAL at 19:54

## 2021-07-12 RX ADMIN — HYDRALAZINE HYDROCHLORIDE 25 MG: 25 TABLET, FILM COATED ORAL at 14:55

## 2021-07-12 RX ADMIN — SODIUM CHLORIDE, PRESERVATIVE FREE 10 ML: 5 INJECTION INTRAVENOUS at 19:36

## 2021-07-12 RX ADMIN — FAMOTIDINE 20 MG: 10 INJECTION, SOLUTION INTRAVENOUS at 19:34

## 2021-07-12 ASSESSMENT — PAIN SCALES - GENERAL
PAINLEVEL_OUTOF10: 0
PAINLEVEL_OUTOF10: 7
PAINLEVEL_OUTOF10: 8
PAINLEVEL_OUTOF10: 6
PAINLEVEL_OUTOF10: 8
PAINLEVEL_OUTOF10: 7
PAINLEVEL_OUTOF10: 5
PAINLEVEL_OUTOF10: 3
PAINLEVEL_OUTOF10: 0

## 2021-07-12 ASSESSMENT — PAIN DESCRIPTION - PAIN TYPE
TYPE: CHRONIC PAIN
TYPE: CHRONIC PAIN
TYPE: ACUTE PAIN
TYPE: CHRONIC PAIN
TYPE: ACUTE PAIN
TYPE: CHRONIC PAIN
TYPE: CHRONIC PAIN

## 2021-07-12 ASSESSMENT — PAIN DESCRIPTION - LOCATION
LOCATION: BACK
LOCATION: BACK
LOCATION: ABDOMEN
LOCATION: BACK
LOCATION: BACK
LOCATION: ABDOMEN
LOCATION: BACK

## 2021-07-12 ASSESSMENT — PAIN DESCRIPTION - DESCRIPTORS: DESCRIPTORS: ACHING

## 2021-07-12 NOTE — PROGRESS NOTES
[facility]    Progress Note     7/12/2021    1:58 PM    Name:   Nadia Reddy  MRN:     [id number]     Acct:      [de-identified]   Room:   2063/2063-01  IP Day:  5  Admit Date:  7/7/2021  6:15 PM    PCP:   ANDRE Bustillo CNP  Code Status:  Full Code    Subjective:     C/C:   Chief Complaint   Patient presents with    Abdominal Pain     x1 month     Principal Problem:    Hyponatremia  Active Problems:    History of gallstones    Peptic ulcer    Seizures ([facility] [address]    History of small bowel obstruction    Chronic abdominal pain  Resolved Problems:    * No resolved hospital problems. *    39-year-old morbidly obese lady class II BMI 28 lives with a sister admitted with abdominal pain chest pain CT scan showed porcelain gallbladder but nil acute  Routine labs revealed severe hyponatremia 118 likely secondary to decreased oral intake check for urine plasma osmolality urine sodium gentle hydration nephrology input requested  We will have the staff call the family patient lives with sister and has Parkinson's disease poor historian  Chest pain atypical in nature 4 months will order CTA rule out PE or dissection will review with results             July 11  Hyponatremia is improving  Abdominal chest pain resolved CTA chest no dissection no PE noted  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                 Significant last 24 hr data reviewed ; Vitals:    07/11/21 1215 07/11/21 1958 07/12/21 0634 07/12/21 0730   BP: (!) 157/53 (!) 148/65 (!) 178/49 (!) 158/70   Pulse: 95 71  65   Resp: 18 18  18   Temp: 98.3 °F (36.8 °C) 97.7 °F (36.5 °C)  98 °F (36.7 °C)   TempSrc: Oral Oral     SpO2: 95% 94%  94%   Weight:    229 lb 11.5 oz (104.2 kg)   Height:          Recent Results (from the past 24 hour(s))   NA (Sodium)    Collection Time: 07/11/21 10:01 PM   Result Value Ref Range    Sodium 130 (L) 135 - 144 mmol/L   Basic Metabolic Panel w/ Reflex to MG    Collection Time: 07/12/21  5:46 AM   Result Value Ref Range    Glucose 95 70 - 99 mg/dL    BUN 18 8 - 23 mg/dL    CREATININE 0.73 0.50 - 0.90 mg/dL    Bun/Cre Ratio NOT REPORTED 9 - 20    Calcium 8.7 8.6 - 10.4 mg/dL    Sodium 128 (L) 135 - 144 mmol/L    Potassium 4.3 3.7 - 5.3 mmol/L    Chloride 96 (L) 98 - 107 mmol/L    CO2 24 20 - 31 mmol/L    Anion Gap 8 (L) 9 - 17 mmol/L    GFR Non-African American >60 >60 mL/min    GFR African American >60 >60 mL/min    GFR Comment          GFR Staging NOT REPORTED    CBC    Collection Time: 07/12/21  5:46 AM   Result Value Ref Range    WBC 4.3 3.5 - 11.0 k/uL    RBC 3.90 (L) 4.0 - 5.2 m/uL    Hemoglobin 12.0 12.0 - 16.0 g/dL    Hematocrit 36.0 36 - 46 %    MCV 92.4 80 - 100 fL    MCH 30.9 26 - 34 pg    MCHC 33.5 31 - 37 g/dL    RDW 13.5 11.5 - 14.9 %    Platelets 863 829 - 572 k/uL    MPV 6.4 6.0 - 12.0 fL    NRBC Automated NOT REPORTED per 100 WBC   Magnesium    Collection Time: 07/12/21  5:46 AM   Result Value Ref Range    Magnesium 2.1 1.6 - 2.6 mg/dL     No results for input(s): POCGLU in the last 72 hours. CT ABDOMEN PELVIS W IV CONTRAST Additional Contrast? None    Result Date: 7/7/2021  EXAMINATION: CT OF THE ABDOMEN AND PELVIS WITH CONTRAST 7/7/2021 7:51 pm TECHNIQUE: CT of the abdomen and pelvis was performed with the administration of intravenous contrast. Multiplanar reformatted images are provided for review.  Dose modulation, iterative reconstruction, and/or weight based adjustment of the mA/kV was utilized to reduce the radiation dose to as low as reasonably achievable. COMPARISON: 04/19/2017 HISTORY: ORDERING SYSTEM PROVIDED HISTORY: Abdo pain, hx SBO TECHNOLOGIST PROVIDED HISTORY: Abdo pain, hx SBO Decision Support Exception - unselect if not a suspected or confirmed emergency medical condition->Emergency Medical Condition (MA) Reason for Exam: pt c/o abd pain x 1 month, pt unable to raise arms above head for CT exam. FINDINGS: Lower Chest: Heart size is normal.  Three-vessel calcific coronary artery disease. Small hiatal hernia. Lung bases are clear. Organs: Cholelithiasis with porcelain gallbladder. No pericholecystic fluid fat stranding. No change from prior study. The liver, spleen, pancreas and kidneys are normal.  Small nodular contour of the bilateral adrenal glands, unchanged. GI/Bowel: Interval partial sigmoidectomy. A few diverticula remain proximal to the sigmoid anastomosis. No evidence of diverticulitis. The appendix is normal.  No bowel obstruction. Pelvis: Urinary bladder is unremarkable. The uterus is surgically absent. Peritoneum/Retroperitoneum: There is no adenopathy, free air or free fluid. There is a retroaortic left renal vein. Calcific aorto iliac atherosclerotic disease with no evidence of an aneurysm. Bones/Soft Tissues: Unchanged/remote compression fractures of T12 and L5. No acute bone finding. No acute finding in the abdomen or pelvis. Cholelithiasis with porcelain gallbladder. No pericholecystic fluid or fat stranding to suggest acute cholecystitis. Interval partial sigmoidectomy. Few diverticular main proximal to the sigmoid anastomosis with no evidence of diverticulitis. Unchanged/remote T12 and L5 compression fractures.      CT CHEST PULMONARY EMBOLISM W CONTRAST    Result Date: 7/8/2021  EXAMINATION: CTA OF THE CHEST 7/8/2021 11:13 am TECHNIQUE: CTA of the chest was performed after the administration of intravenous contrast.  Multiplanar reformatted images are provided for review. MIP images are provided for review. Dose modulation, iterative reconstruction, and/or weight based adjustment of the mA/kV was utilized to reduce the radiation dose to as low as reasonably achievable. COMPARISON: CT abdomen and pelvis yesterday. HISTORY: ORDERING SYSTEM PROVIDED HISTORY: chest pain rule out aaa ,pe TECHNOLOGIST PROVIDED HISTORY: chest pain rule out aaa ,pe Reason for Exam: chest pain rule out aaa ,pe Acuity: Unknown Type of Exam: Unknown Additional signs and symptoms: pt states chest/stomach pain Relevant Medical/Surgical History: no surgeries to area FINDINGS: Pulmonary Arteries: Pulmonary arteries are adequately opacified for evaluation. No evidence of intraluminal filling defect to suggest pulmonary embolism. Main pulmonary artery is normal in caliber. Mediastinum: No evidence of mediastinal lymphadenopathy. The heart and pericardium demonstrate no acute abnormality. There is no acute abnormality of the thoracic aorta. Lungs/pleura: No acute airspace disease identified. Mosaic attenuation of the lung parenchyma, likely due to regional air trapping. Sequela of old granulomatous disease. No effusion. The central airway is patent. Upper Abdomen: Cholelithiasis. Small hiatal hernia. Soft Tissues/Bones: No acute findings. Chronic T12 compression deformity. 1.  No evidence of pulmonary embolism or acute pulmonary abnormality. 2.  No thoracic aortic aneurysm or acute aortic abnormality appreciated. 3.  Cholelithiasis.              HPI:     Overnight no fever chills no nausea vomiting tolerating diet denies any chest pain abdominal pain patient is refusing to go back to live with her sister wants a place to be found for her    Review of Systems:     Constitutional:  negative for chills, fevers, sweats  Respiratory:  negative for cough, dyspnea on exertion, hemoptysis, shortness of breath, wheezing  Cardiovascular: negative for chest pain, chest pressure/discomfort, lower extremity edema, palpitations  Gastrointestinal:  negative for abdominal pain, constipation, diarrhea, nausea, vomiting  Neurological:  negative for dizziness, headache  Data:     Past Medical History:  no change     Social History:  no change    Family History: @no change    Vitals:      I/O (24Hr): Intake/Output Summary (Last 24 hours) at 7/12/2021 1358  Last data filed at 7/12/2021 0345  Gross per 24 hour   Intake 185 ml   Output 575 ml   Net -390 ml       Labs:    URINE ANALYSIS: No results found for: LABURIN     CBC:  Lab Results   Component Value Date    WBC 4.3 07/12/2021    HGB 12.0 07/12/2021     07/12/2021     05/04/2012        BMP:    Lab Results   Component Value Date     07/12/2021    K 4.3 07/12/2021    CL 96 07/12/2021    CO2 24 07/12/2021    BUN 18 07/12/2021    CREATININE 0.73 07/12/2021    GLUCOSE 95 07/12/2021    GLUCOSE 96 05/04/2012      LIVER PROFILE:  Lab Results   Component Value Date    ALT 9 07/07/2021    AST 15 07/07/2021    PROT 7.3 07/07/2021    BILITOT 0.41 07/07/2021    BILIDIR <0.08 10/16/2015    LABALBU 4.4 07/07/2021    LABALBU 3.9 05/04/2012               Radiology:  Medications:      Allergies:      Current Meds:   Scheduled Meds:    enoxaparin  30 mg Subcutaneous BID    hydrALAZINE  25 mg Oral 3 times per day    sodium chloride flush  5-40 mL Intravenous 2 times per day    famotidine (PEPCID) injection  20 mg Intravenous BID    OXcarbazepine  300 mg Oral BID     Continuous Infusions:    sodium chloride       PRN Meds: sodium chloride flush, sodium chloride flush, sodium chloride flush, sodium chloride, potassium chloride **OR** potassium alternative oral replacement **OR** potassium chloride, magnesium sulfate, polyethylene glycol, acetaminophen **OR** acetaminophen, ondansetron, morphine **OR** morphine      Physical Examination:        BP (!) 158/70   Pulse 65   Temp 98 °F (36.7 °C)   Resp 18    5' 6\" (1.676 m)   Wt 229 lb 11.5 oz (104.2 kg)   SpO2 94%   BMI 37.08 kg/m²   Temp (24hrs), Av.9 °F (36.6 °C), Min:97.7 °F (36.5 °C), Max:98 °F (36.7 °C)    No results for input(s): POCGLU in the last 72 hours. Intake/Output Summary (Last 24 hours) at 2021 1358  Last data filed at 2021 0345  Gross per 24 hour   Intake 185 ml   Output 575 ml   Net -390 ml       General Appearance:  alert, well appearing, and in no acute distress  Mental status:   Head:  normocephalic, atraumatic. Eye: no icterus, redness, pupils equal and reactive, extraocular eye movements intact, conjunctiva clear  Ear: normal external ear, no discharge, hearing intact  Nose:  no drainage noted  Mouth: mucous membranes moist  No teeth and mouth  Neck: supple, no carotid bruits, thyroid not palpable  Lungs: Bilateral equal air entry, clear to ausculation, no wheezing, rales or rhonchi, normal effort  Cardiovascular: normal rate, regular rhythm, no murmur, gallop, rub.   Abdomen: Soft, nontender, nondistended, normal bowel sounds, no hepatomegaly or splenomegaly  Neurologic: There are no new focal motor or sensory deficits,  Frequent tongues and lip smacking likely part of Parkinson's disease  Skin: No gross lesions, rashes, bruising or bleeding on exposed skin area  Extremities:  peripheral pulses palpable, no pedal edema or calf pain with palpation              Assessment:        Primary Problem  Hyponatremia    Active Hospital Problems    Diagnosis Date Noted    History of small bowel obstruction [Z87.19] 2021    Chronic abdominal pain [R10.9, G89.29] 2021    Hyponatremia [E87.1] 2021    Seizures ([address] [R56.9] 2011    History of gallstones [Z87.19] 2011    Peptic ulcer [K27.9] 2011     Plan:        75-year-old morbidly obese lady class II BMI 28 lives with a sister admitted with abdominal pain chest pain CT scan showed porcelain gallbladder but nil acute  Routine labs revealed severe hyponatremia 118 likely secondary to decreased oral intake check for urine plasma osmolality urine sodium gentle hydration nephrology input requested  We will have the staff call the family patient lives with sister and has Parkinson's disease poor historian  Chest pain atypical in nature 4 months will order CTA rule out PE or dissection will review with results             July 11  Hyponatremia is improving  Abdominal chest pain resolved CTA chest no dissection no PE noted  Porcelain gallbladder high risk for gallbladder malignancy needs outpatient surgical evaluation  Parkinson's disease and deconditioning requiring extended care facility  Need ecf placement  Dementia,clinically   Dc plan in am if ok with nephro    7/12/21  Sodium 128 . Clinically doing ok   precert in progress   1. Hyponatremia - Patient is clinically euvolemic and hyponatremia in this patient is consistent with SIADH. This may be secondary to Oxcarbazepine[Trileptal]. Serum sodium is stable at 132 mmol/L. Plan: Continue fluid restriction 1500 mL/day. Check serum sodium level every 8 hours with target rate of correction of sodium not to exceed 0.5 mmol/L/h [maximum 8 mmol over 24 hours].     2.   Systemic hypertension - Continue hydralazine 25 mg p.o. 3 times daily.        Adwoa Mars MD  7/12/2021  1:58 PM

## 2021-07-12 NOTE — PROGRESS NOTES
Physical Therapy    [address] [facility]      Date: 21  Patient Name: Anna Miller       Room: [id number]  MRN: [id number]  Account: [de-identified]   : 1950  (65 y.o.) Gender: female       Goals modiifed as below:     21 1723   Short term goals   Time Frame for Short term goals 5 to 7 sessions   Short term goal 1 Improve strength in both LE to 4+/5   Short term goal 2 Independent in bed mobility   Short term goal 3 Improve sitting balance to good   Short term goal 4 Supine<>sit at [facility]   Short term goal 5 Sit to stand with rolling walker , CGA   Short term goal 6 Pt able to perform transfers at min A    Short term goal 7 pt able to ambulate with Rolling walker distance fo 20 ft, min A x 2

## 2021-07-12 NOTE — PROGRESS NOTES
Physical Therapy        Physical Therapy Cancel Note      DATE: 2021    NAME: Nohelia Hudson  MRN: [id number]   : 1950      Patient not seen this date for Physical Therapy due to: Other: Pt refusing PT despite encouragement and education on the importance of functional mobility. Will continue to follow for PT services.        Electronically signed by Emeli Kelsey PTA on 2021 at 2:11 PM

## 2021-07-12 NOTE — PROGRESS NOTES
REGINA sent therapy notes to [facility]. REGINA spoke to [place] from the facility. Pre-cert was started . REGINA started 7000 in HENS.

## 2021-07-12 NOTE — PLAN OF CARE
Problem: Falls - Risk of:  Goal: Will remain free from falls  Description: Will remain free from falls  7/12/2021 0517 by Hortencia Lopez RN  Outcome: Ongoing  7/11/2021 1612 by Oleksandr Quick RN  Outcome: Ongoing  Goal: Absence of physical injury  Description: Absence of physical injury  7/12/2021 0517 by Hortencia Lopez RN  Outcome: Ongoing  7/11/2021 1612 by Oleksandr Quick RN  Outcome: Ongoing     Problem: Infection:  Goal: Will remain free from infection  Description: Will remain free from infection  7/12/2021 0517 by Hortencia Lopez RN  Outcome: Ongoing  7/11/2021 1612 by Oleksandr Quick RN  Outcome: Ongoing     Problem: Safety:  Goal: Free from accidental physical injury  Description: Free from accidental physical injury  7/12/2021 0517 by Hortencia Lopez RN  Outcome: Ongoing  7/11/2021 1612 by Oleksandr Quick RN  Outcome: Ongoing  Goal: Free from intentional harm  Description: Free from intentional harm  Outcome: Ongoing     Problem: Daily Care:  Goal: Daily care needs are met  Description: Daily care needs are met  Outcome: Ongoing

## 2021-07-12 NOTE — PROGRESS NOTES
Bedside rounding done, per Darren Daley and Cisco Villalobos RN's  Pt. Rests peacefully, pt. Requests something for pain, Barbara will check into this asap.  Bed alarm on  Purewick intact, depends dry at this time

## 2021-07-12 NOTE — PROGRESS NOTES
Department of Internal Medicine  Nephrology [facility], MD  Progress Note    Reason for consultation: Management of hyponatremia. Consulting physician: Dana Maynard. MD Nina.    Interval history:   Patient was seen and examined today and she does not have any new complaints. She is nonoliguric and now has an external catheter in place. Serum sodium stable at 130s    History of present illness: This is a 65 y.o. female with a significant past medical history of Bipolar disorder, hyperlipidemia, chronic obstructive pulmonary disease and systemic hypertension, who presented to the emergency department yesterday with complaints of Acute on chronic abdominal pain of 4 weeks duration. There was no associated fever, chills, nausea, vomiting, constipation or diarrhea. She did not have any seizures. Laboratory studies were remarkable for serum sodium 119 mmol/L and hence nephrology consultation. Home medications Trileptal and Keppra.      Scheduled Meds:   enoxaparin  30 mg Subcutaneous BID    hydrALAZINE  25 mg Oral 3 times per day    sodium chloride flush  5-40 mL Intravenous 2 times per day    famotidine (PEPCID) injection  20 mg Intravenous BID    OXcarbazepine  300 mg Oral BID     Continuous Infusions:   sodium chloride       PRN Meds:.sodium chloride flush, sodium chloride, potassium chloride **OR** potassium alternative oral replacement **OR** potassium chloride, magnesium sulfate, polyethylene glycol, acetaminophen **OR** acetaminophen, ondansetron, morphine **OR** morphine    Physical Exam:    VITALS:  BP 83/63   Pulse 81   Temp 98.2 °F (36.8 °C)   Resp 18   Ht 5' 6\" (1.676 m)   Wt 229 lb 11.5 oz (104.2 kg)   SpO2 95%   BMI 37.08 kg/m²   24HR INTAKE/OUTPUT:      Intake/Output Summary (Last 24 hours) at 7/12/2021 1623  Last data filed at 7/12/2021 0345  Gross per 24 hour   Intake 10 ml   Output 575 ml   Net -565 ml     Constitutional: alert, appears stated age and cooperative    Skin: No rash but evidence of hirsutism. Head: Normocephalic, without obvious abnormality, atraumatic     Cardiovascular/Edema: regular rate and rhythm, S1, S2 normal, no murmur, click, rub or gallop    Respiratory: Lungs: clear to auscultation bilaterally    Abdomen: soft, non-tender; bowel sounds normal; no masses,  no organomegaly    Back: symmetric, no curvature. ROM normal. No CVA tenderness. Extremities: extremities normal, atraumatic, no cyanosis or edema    Neuro:  Involuntary movements especially of the muscles of mastication consistent with tardive dyskinesia    CBC:   Recent Labs     07/10/21  0558 07/11/21  0539 07/12/21  0546   WBC 6.8 5.4 4.3   HGB 13.1 12.0 12.0    211 215     BMP:    Recent Labs     07/10/21  0558 07/10/21  1824 07/11/21  0539 07/11/21  1340 07/11/21  2201 07/12/21  0546 07/12/21  1335   *   < > 132*   < > 130* 128* 131*   K 4.2  --  4.3  --   --  4.3  --    CL 98  --  99  --   --  96*  --    CO2 23  --  24  --   --  24  --    BUN 10  --  14  --   --  18  --    CREATININE 0.70  --  0.76  --   --  0.73  --    GLUCOSE 100*  --  96  --   --  95  --     < > = values in this interval not displayed. Lab Results   Component Value Date    NITRU NEGATIVE 07/07/2021    COLORU YELLOW 07/07/2021    PHUR 6.5 07/07/2021    WBCUA 0 TO 2 07/07/2021    RBCUA 0 TO 2 07/07/2021    MUCUS NOT REPORTED 07/07/2021    TRICHOMONAS NOT REPORTED 07/07/2021    YEAST NOT REPORTED 07/07/2021    BACTERIA FEW 07/07/2021    SPECGRAV 1.049 07/07/2021    LEUKOCYTESUR NEGATIVE 07/07/2021    UROBILINOGEN Normal 07/07/2021    BILIRUBINUR NEGATIVE 07/07/2021    BILIRUBINUR NEGATIVE 05/04/2012    GLUCOSEU NEGATIVE 07/07/2021    GLUCOSEU NEGATIVE 05/04/2012    KETUA NEGATIVE 07/07/2021    AMORPHOUS NOT REPORTED 07/07/2021     IMPRESSION/RECOMMENDATIONS:      1. Hyponatremia - Patient is clinically euvolemic and hyponatremia in this patient is consistent with SIADH.   This may be secondary to Oxcarbazepine[Trileptal]. Serum sodium is stable at 131 mmol/L. Plan: Continue fluid restriction 1500 mL/day. 2.  Systemic hypertension - Continue hydralazine 25 mg p.o. 3 times daily. Prognosis is guarded.     Arron De La Cruz MD   Attending Nephrologist  7/12/2021 4:23 PM

## 2021-07-12 NOTE — PROGRESS NOTES
[address]   OCCUPATIONAL THERAPY MISSED TREATMENT NOTE   INPATIENT   Date: 21  Patient Name: Alesha Carias       Room: 4783/7104-41  MRN: [id number]   Account #: [de-identified]    : 1950  (71 y.o.)  Gender: female   Diagnosis: admitted 21 with abdominal pain chest pain CT scan showed porcelain gallbladder but nil acute, Hyponatremia - Patient is clinically euvolemic and hyponatremia in this patient is consistent with SIADH. systemic hypertension             REASON FOR MISSED TREATMENT:  Patient refusal   -   Pt refusing OT this date despite max encouragement to participate. Writer will attempt back at next available date.      SANTOSH Cason

## 2021-07-13 PROBLEM — G21.19 OTHER DRUG INDUCED SECONDARY PARKINSONISM (HCC): Status: ACTIVE | Noted: 2021-07-13

## 2021-07-13 LAB
-: NORMAL
ANION GAP SERPL CALCULATED.3IONS-SCNC: 9 MMOL/L (ref 9–17)
BUN BLDV-MCNC: 22 MG/DL (ref 8–23)
BUN/CREAT BLD: ABNORMAL (ref 9–20)
CALCIUM SERPL-MCNC: 8.6 MG/DL (ref 8.6–10.4)
CHLORIDE BLD-SCNC: 96 MMOL/L (ref 98–107)
CO2: 23 MMOL/L (ref 20–31)
CREAT SERPL-MCNC: 0.79 MG/DL (ref 0.5–0.9)
GFR AFRICAN AMERICAN: >60 ML/MIN
GFR NON-AFRICAN AMERICAN: >60 ML/MIN
GFR SERPL CREATININE-BSD FRML MDRD: ABNORMAL ML/MIN/{1.73_M2}
GFR SERPL CREATININE-BSD FRML MDRD: ABNORMAL ML/MIN/{1.73_M2}
GLUCOSE BLD-MCNC: 96 MG/DL (ref 70–99)
HCT VFR BLD CALC: 37.5 % (ref 36–46)
HEMOGLOBIN: 12.5 G/DL (ref 12–16)
MAGNESIUM: 2 MG/DL (ref 1.6–2.6)
MCH RBC QN AUTO: 30.4 PG (ref 26–34)
MCHC RBC AUTO-ENTMCNC: 33.3 G/DL (ref 31–37)
MCV RBC AUTO: 91.3 FL (ref 80–100)
NRBC AUTOMATED: NORMAL PER 100 WBC
PDW BLD-RTO: 13.6 % (ref 11.5–14.9)
PLATELET # BLD: 280 K/UL (ref 150–450)
PMV BLD AUTO: 7.8 FL (ref 6–12)
POTASSIUM SERPL-SCNC: 4.5 MMOL/L (ref 3.7–5.3)
RBC # BLD: 4.1 M/UL (ref 4–5.2)
REASON FOR REJECTION: NORMAL
SODIUM BLD-SCNC: 128 MMOL/L (ref 135–144)
WBC # BLD: 4.7 K/UL (ref 3.5–11)
ZZ NTE CLEAN UP: ORDERED TEST: NORMAL
ZZ NTE WITH NAME CLEAN UP: SPECIMEN SOURCE: NORMAL

## 2021-07-13 PROCEDURE — 99232 SBSQ HOSP IP/OBS MODERATE 35: CPT | Performed by: INTERNAL MEDICINE

## 2021-07-13 PROCEDURE — 97110 THERAPEUTIC EXERCISES: CPT

## 2021-07-13 PROCEDURE — 83735 ASSAY OF MAGNESIUM: CPT

## 2021-07-13 PROCEDURE — 6370000000 HC RX 637 (ALT 250 FOR IP): Performed by: INTERNAL MEDICINE

## 2021-07-13 PROCEDURE — 6360000002 HC RX W HCPCS: Performed by: NURSE PRACTITIONER

## 2021-07-13 PROCEDURE — 36415 COLL VENOUS BLD VENIPUNCTURE: CPT

## 2021-07-13 PROCEDURE — 85027 COMPLETE CBC AUTOMATED: CPT

## 2021-07-13 PROCEDURE — 6370000000 HC RX 637 (ALT 250 FOR IP): Performed by: NURSE PRACTITIONER

## 2021-07-13 PROCEDURE — 2500000003 HC RX 250 WO HCPCS: Performed by: NURSE PRACTITIONER

## 2021-07-13 PROCEDURE — 80048 BASIC METABOLIC PNL TOTAL CA: CPT

## 2021-07-13 PROCEDURE — 2580000003 HC RX 258: Performed by: NURSE PRACTITIONER

## 2021-07-13 PROCEDURE — 1200000000 HC SEMI PRIVATE

## 2021-07-13 RX ORDER — LORAZEPAM 2 MG/ML
1 INJECTION INTRAMUSCULAR ONCE
Status: COMPLETED | OUTPATIENT
Start: 2021-07-13 | End: 2021-07-13

## 2021-07-13 RX ORDER — LORAZEPAM 0.5 MG/1
0.5 TABLET ORAL EVERY 6 HOURS PRN
Status: DISCONTINUED | OUTPATIENT
Start: 2021-07-13 | End: 2021-07-14 | Stop reason: HOSPADM

## 2021-07-13 RX ORDER — SODIUM CHLORIDE 1000 MG
1 TABLET, SOLUBLE MISCELLANEOUS
Status: DISCONTINUED | OUTPATIENT
Start: 2021-07-13 | End: 2021-07-14 | Stop reason: HOSPADM

## 2021-07-13 RX ORDER — HYDRALAZINE HYDROCHLORIDE 50 MG/1
50 TABLET, FILM COATED ORAL EVERY 8 HOURS SCHEDULED
Status: DISCONTINUED | OUTPATIENT
Start: 2021-07-13 | End: 2021-07-14 | Stop reason: HOSPADM

## 2021-07-13 RX ADMIN — MORPHINE SULFATE 4 MG: 4 INJECTION, SOLUTION INTRAMUSCULAR; INTRAVENOUS at 23:42

## 2021-07-13 RX ADMIN — SODIUM CHLORIDE, PRESERVATIVE FREE 10 ML: 5 INJECTION INTRAVENOUS at 07:31

## 2021-07-13 RX ADMIN — HYDRALAZINE HYDROCHLORIDE 50 MG: 50 TABLET, FILM COATED ORAL at 22:46

## 2021-07-13 RX ADMIN — ACETAMINOPHEN 650 MG: 325 TABLET ORAL at 17:37

## 2021-07-13 RX ADMIN — MORPHINE SULFATE 4 MG: 4 INJECTION, SOLUTION INTRAMUSCULAR; INTRAVENOUS at 19:21

## 2021-07-13 RX ADMIN — ENOXAPARIN SODIUM 30 MG: 30 INJECTION SUBCUTANEOUS at 20:27

## 2021-07-13 RX ADMIN — SODIUM CHLORIDE 1 G: 1 TABLET ORAL at 12:20

## 2021-07-13 RX ADMIN — HYDRALAZINE HYDROCHLORIDE 25 MG: 25 TABLET, FILM COATED ORAL at 05:18

## 2021-07-13 RX ADMIN — SODIUM CHLORIDE, PRESERVATIVE FREE 10 ML: 5 INJECTION INTRAVENOUS at 22:47

## 2021-07-13 RX ADMIN — MORPHINE SULFATE 4 MG: 4 INJECTION, SOLUTION INTRAMUSCULAR; INTRAVENOUS at 07:28

## 2021-07-13 RX ADMIN — ONDANSETRON 4 MG: 2 INJECTION INTRAMUSCULAR; INTRAVENOUS at 18:48

## 2021-07-13 RX ADMIN — OXCARBAZEPINE 300 MG: 300 TABLET, FILM COATED ORAL at 22:41

## 2021-07-13 RX ADMIN — MORPHINE SULFATE 4 MG: 4 INJECTION, SOLUTION INTRAMUSCULAR; INTRAVENOUS at 14:34

## 2021-07-13 RX ADMIN — ACETAMINOPHEN 650 MG: 325 TABLET ORAL at 11:37

## 2021-07-13 RX ADMIN — FAMOTIDINE 20 MG: 10 INJECTION, SOLUTION INTRAVENOUS at 07:29

## 2021-07-13 RX ADMIN — LORAZEPAM 1 MG: 2 INJECTION INTRAMUSCULAR; INTRAVENOUS at 22:41

## 2021-07-13 RX ADMIN — SODIUM CHLORIDE 1 G: 1 TABLET ORAL at 18:18

## 2021-07-13 RX ADMIN — ONDANSETRON 4 MG: 2 INJECTION INTRAMUSCULAR; INTRAVENOUS at 23:42

## 2021-07-13 RX ADMIN — FAMOTIDINE 20 MG: 10 INJECTION, SOLUTION INTRAVENOUS at 20:27

## 2021-07-13 RX ADMIN — HYDRALAZINE HYDROCHLORIDE 50 MG: 50 TABLET, FILM COATED ORAL at 13:32

## 2021-07-13 RX ADMIN — OXCARBAZEPINE 300 MG: 300 TABLET, FILM COATED ORAL at 07:31

## 2021-07-13 RX ADMIN — ACETAMINOPHEN 650 MG: 325 TABLET ORAL at 05:18

## 2021-07-13 RX ADMIN — ENOXAPARIN SODIUM 30 MG: 30 INJECTION SUBCUTANEOUS at 07:29

## 2021-07-13 ASSESSMENT — PAIN SCALES - GENERAL
PAINLEVEL_OUTOF10: 6
PAINLEVEL_OUTOF10: 8
PAINLEVEL_OUTOF10: 6
PAINLEVEL_OUTOF10: 0
PAINLEVEL_OUTOF10: 4
PAINLEVEL_OUTOF10: 7
PAINLEVEL_OUTOF10: 8
PAINLEVEL_OUTOF10: 5
PAINLEVEL_OUTOF10: 8
PAINLEVEL_OUTOF10: 9
PAINLEVEL_OUTOF10: 10
PAINLEVEL_OUTOF10: 7
PAINLEVEL_OUTOF10: 8

## 2021-07-13 ASSESSMENT — PAIN DESCRIPTION - ORIENTATION: ORIENTATION: MID

## 2021-07-13 ASSESSMENT — PAIN DESCRIPTION - DESCRIPTORS
DESCRIPTORS: ACHING
DESCRIPTORS: ACHING

## 2021-07-13 ASSESSMENT — PAIN DESCRIPTION - LOCATION
LOCATION: ABDOMEN
LOCATION: ABDOMEN
LOCATION: BACK
LOCATION: ABDOMEN
LOCATION: BACK

## 2021-07-13 ASSESSMENT — PAIN DESCRIPTION - PAIN TYPE
TYPE: CHRONIC PAIN

## 2021-07-13 ASSESSMENT — PAIN SCALES - WONG BAKER: WONGBAKER_NUMERICALRESPONSE: 6

## 2021-07-13 NOTE — PLAN OF CARE
Problem: Falls - Risk of:  Goal: Will remain free from falls  Description: Will remain free from falls  Outcome: Ongoing  Goal: Absence of physical injury  Description: Absence of physical injury  Outcome: Ongoing     Problem: Infection:  Goal: Will remain free from infection  Description: Will remain free from infection  Outcome: Ongoing     Problem: Safety:  Goal: Free from accidental physical injury  Description: Free from accidental physical injury  Outcome: Ongoing  Goal: Free from intentional harm  Description: Free from intentional harm  Outcome: Ongoing     Problem: Daily Care:  Goal: Daily care needs are met  Description: Daily care needs are met  Outcome: Ongoing     Problem: Pain:  Goal: Patient's pain/discomfort is manageable  Description: Patient's pain/discomfort is manageable  Outcome: Ongoing  Goal: Pain level will decrease  Description: Pain level will decrease  Outcome: Ongoing  Goal: Control of acute pain  Description: Control of acute pain  Outcome: Ongoing  Goal: Control of chronic pain  Description: Control of chronic pain  Outcome: Ongoing     Problem: Skin Integrity:  Goal: Skin integrity will stabilize  Description: Skin integrity will stabilize  Outcome: Ongoing     Problem: Discharge Planning:  Goal: Patients continuum of care needs are met  Description: Patients continuum of care needs are met  Outcome: Ongoing     Problem: Skin Integrity:  Goal: Will show no infection signs and symptoms  Description: Will show no infection signs and symptoms  Outcome: Ongoing  Goal: Absence of new skin breakdown  Description: Absence of new skin breakdown  Outcome: Ongoing     Problem: Musculor/Skeletal Functional Status  Goal: Highest potential functional level  Outcome: Ongoing  Goal: Absence of falls  Outcome: Ongoing

## 2021-07-13 NOTE — PROGRESS NOTES
Physical Therapy  Facility/Department: [facility] MED SURG  Daily Treatment Note  NAME: Lynnette Rosado  : 1950  MRN: [id number]    Date of Service: 2021    Discharge Recommendations:  Patient would benefit from continued therapy after discharge        Assessment   Body structures, Functions, Activity limitations: Decreased functional mobility ; Decreased strength;Decreased endurance;Decreased balance  Assessment: Pt would benefit from additonal PT to  increase safety, strength, endurance, and independence with functional mobility. Treatment Diagnosis: impaired strength and mobility  Specific instructions for Next Treatment: ther exs and ther activities as tolerated by pt  Prognosis: Fair  Decision Making: Medium Complexity  History: Greg Mireles is a 65 y.o. female who presents to ER on 21 with 4-week history of chronic abdominal pain with multiple history of small bowel obstructions. Patient states that she has not been to seen a doctor in the last 4 weeks for the pain as it was initially manageable, however has worsened in the last couple of days  Exam: MMT, ROM, sensation , functional mobility and balance assessment. Clinical Presentation: Pt not very cooperative, constantly talking , needed maximal assistance for bed mobility. Barriers to Learning: motivation  REQUIRES PT FOLLOW UP: Yes  Activity Tolerance  Activity Tolerance: Patient Tolerated treatment well     Patient Diagnosis(es): The encounter diagnosis was Generalized abdominal pain. has a past medical history of Bowel obstruction ([address] COPD (chronic obstructive pulmonary disease) ([address] Fecal incontinence, Gallstones, Hyperlipidemia, Hyperplastic polyp of intestine, Hypertension, Psychiatric problem, and Seizures ([address] has a past surgical history that includes Hysterectomy; Colonoscopy (2017); and pr colsc flx w/removal lesion by hot bx forceps (N/A, 5/3/2017).     Restrictions  Restrictions/Precautions  Restrictions/Precautions: General Precautions, Contact Precautions, Fall Risk, Seizure  Required Braces or Orthoses?: No  Implants present? :  (Pt denies)  Position Activity Restriction  Other position/activity restrictions: fluid restriction 1500 mL/day. Subjective   General  Chart Reviewed: Yes  Response To Previous Treatment: Not applicable (PT eval done today)  Family / Caregiver Present: No  Referring Practitioner: Ulysses Kappa M.D. Subjective  Subjective: Pt is agreeable to PT with encouragement. Pt fixates on abd pain. General Comment  Comments: RN Jessica Marin ok's pt for PT. Per RN, pt's BP is in an appropriate range. Pain Screening  Patient Currently in Pain: Yes  Pain Assessment  Pain Assessment: Faces  Bhatti-Baker Pain Rating: Hurts even more  Pain Location: Abdomen  Pain Orientation: Mid  Vital Signs  Patient Currently in Pain: Yes  Oxygen Therapy  O2 Device: None (Room air)  Pre Treatment Pain Screening  Pain at present: 4  Scale Used: Numeric Score  Intervention List: Patient able to continue with treatment    Orientation  Orientation  Overall Orientation Status: Impaired  Orientation Level: Disoriented to time;Oriented to place;Oriented to situation;Oriented to person  Objective   Bed mobility  Bridging: Independent  Supine to Sit: Minimal assistance  Sit to Supine: Contact guard assistance  Scooting: Stand by assistance  Comment: Pt demos reaching for writers assistance for trunk support. Education on the importance of using bed rails to improve iindependence with bed mobility. Pt sat EOB x~10 min completing ther ex. Pt able to lateral scoot towards HOB and return supine with CGA and cues for technique. Transfers  Sit to Stand: Minimal Assistance (second person SBA)  Stand to sit: Contact guard assistance (Second person SBA )  Comment: Pt requires cues for safe hand placement with fair  carryover. Pt is slightly impulsive during tx however redirectable. Transfers completed with RW.  Encouraged pt to amb however pt declines due to pain and fatigue. Ambulation  Ambulation?: No  Stairs/Curb  Stairs?: No     Balance  Posture: Fair  Sitting - Static: Fair;-  Sitting - Dynamic: Fair;-  Standing - Static: Fair  Standing - Dynamic: Fair;-  Comments: standing balance assessed with RW. Other exercises  Other exercises?: Yes  Other exercises 1: Supine BLE ex's. Reps: 5-10 each. Other exercises 2: Seated LAQ's and marches. Reps: 10 each. Other exercises 3: Static stand. x1 wiith RW. Min A for safety. Pregait weight shifting to tolerance. Comment: rest breaks PRN. Goals  Short term goals  Time Frame for Short term goals: 5 to 7 sessions  Short term goal 1: Improve strength in both LE to 4+/5  Short term goal 2:  Independent in bed mobility  Short term goal 3: Improve sitting balance to good  Short term goal 4: Supine<>sit at [facility]  Short term goal 5: Sit to stand with rolling walker , CGA  Short term goal 6: Pt able to perform transfers at min A   Short term goal 7: pt able to ambulate with Rolling walker distance fo 20 ft, min A x 2   Patient Goals   Patient goals : not go back to staying with her sister    Plan    Plan  Times per week: 5-7  Times per day: Daily  Plan weeks: 2  Specific instructions for Next Treatment: ther exs and ther activities as tolerated by pt  Current Treatment Recommendations: Strengthening, Balance Training, Functional Mobility Training  Safety Devices  Type of devices: Bed alarm in place, All fall risk precautions in place, Nurse notified, Left in bed     Therapy Time   Individual Concurrent Group Co-treatment   Time In 1404         Time Out 1420         Minutes [address], [place]

## 2021-07-13 NOTE — PROGRESS NOTES
[facility]    OCCUPATIONAL THERAPY MISSED TREATMENT NOTE   INPATIENT   Date: 21  Patient Name: Silas Guzman       Room: 8682/3795-10  MRN: [id number]   Account #: [de-identified]    : 1950  (71 y.o.)  Gender: female   Diagnosis: admitted 21 with abdominal pain chest pain CT scan showed porcelain gallbladder but nil acute, Hyponatremia - Patient is clinically euvolemic and hyponatremia in this patient is consistent with SIADH. systemic hypertension             REASON FOR MISSED TREATMENT:  Hold treatment per nursing request   -  RN held OT treatment this AM due to increased BP.         SANTOSH Cason

## 2021-07-13 NOTE — PROGRESS NOTES
[place] [address] Internal Medicine    Progress Note     7/13/2021    11:31 AM    Name:   Phillip Childs  MRN:     [id number]     Acct:      [de-identified]   Room:   2063/2063-01  IP Day:  6  Admit Date:  7/7/2021  6:15 PM    PCP:   ANDRE Vargas CNP  Code Status:  Full Code    Subjective:     C/C:   Chief Complaint   Patient presents with    Abdominal Pain     x1 month     Principal Problem:    Hyponatremia  Active Problems:    History of gallstones    Peptic ulcer    Seizures ([facility] [address]    History of small bowel obstruction    Chronic abdominal pain    SIADH (syndrome of inappropriate ADH production) ([facility] [address]  Resolved Problems:    * No resolved hospital problems. *    33-year-old morbidly obese lady class II BMI 28 lives with a sister admitted with abdominal pain chest pain CT scan showed porcelain gallbladder but nil acute  Routine labs revealed severe hyponatremia 118 likely secondary to decreased oral intake check for urine plasma osmolality urine sodium gentle hydration nephrology input requested  We will have the staff call the family patient lives with sister and has Parkinson's disease poor historian  Chest pain atypical in nature 4 months will order CTA rule out PE or dissection will review with results          7/13/21   Pt c/o pain abdomen radiating to back. Getting pain medications. Denies any nausea, vomiting. She remained afebrile, BP remained high overnight. Hydralazine increased to 50 mg from 25    Pt tolerating PO feed, good urine output. Didn't have BM, will add bowel regimen. Labs show persistent Hyponatremia Na 128, nephro started salt tablets 1 gm PO 3 times daily. Recommended fluid restriction 1500 ml/day.      CT abdo pelvis 7/7/21:     Cholelithiasis with porcelain gallbladder.  No pericholecystic fluid or fat   stranding to suggest acute cholecystitis.       Interval partial sigmoidectomy.  Few diverticular main proximal to the   sigmoid anastomosis with no evidence of diverticulitis. Discharge planning, awaiting precert. Appreciate Nephro input. BMP:   Recent Labs     07/12/21  0546 07/12/21  1335 07/12/21  2131 07/13/21  0728   *   < > 127* 128*   K 4.3  --   --  4.5   CO2 24  --   --  23   BUN 18  --   --  22   CREATININE 0.73  --   --  0.79   LABGLOM >60  --   --  >60   GLUCOSE 95  --   --  96    < > = values in this interval not displayed. Significant last 24 hr data reviewed ;   Vitals:    07/12/21 1932 07/13/21 0518 07/13/21 0730 07/13/21 0946   BP: (!) 187/69 (!) 197/75 (!) 211/75 (!) 164/57   Pulse: 73  70 78   Resp: 18  18 18   Temp: 98.2 °F (36.8 °C)  97.6 °F (36.4 °C) 97.5 °F (36.4 °C)   TempSrc:   Axillary    SpO2: 94%  94% 96%   Weight:       Height:          Recent Results (from the past 24 hour(s))   NA (Sodium)    Collection Time: 07/12/21  1:35 PM   Result Value Ref Range    Sodium 131 (L) 135 - 144 mmol/L   NA (Sodium)    Collection Time: 07/12/21  9:31 PM   Result Value Ref Range    Sodium 127 (L) 135 - 144 mmol/L   CBC    Collection Time: 07/13/21  6:22 AM   Result Value Ref Range    WBC 4.7 3.5 - 11.0 k/uL    RBC 4.10 4.0 - 5.2 m/uL    Hemoglobin 12.5 12.0 - 16.0 g/dL    Hematocrit 37.5 36 - 46 %    MCV 91.3 80 - 100 fL    MCH 30.4 26 - 34 pg    MCHC 33.3 31 - 37 g/dL    RDW 13.6 11.5 - 14.9 %    Platelets 923 054 - 384 k/uL    MPV 7.8 6.0 - 12.0 fL    NRBC Automated NOT REPORTED per 100 WBC   SPECIMEN REJECTION    Collection Time: 07/13/21  6:22 AM   Result Value Ref Range    Specimen Source . BLOOD     Ordered Test [facility]- PSYCHIATRY & ADDICTIVE MED     Reason for Rejection Unable to perform testing: Specimen hemolyzed.      - NOT REPORTED    Basic Metabolic Panel w/ Reflex to MG    Collection Time: 07/13/21  7:28 AM   Result Value Ref Range    Glucose 96 70 - 99 mg/dL    BUN 22 8 - 23 mg/dL    CREATININE 0.79 0.50 - 0.90 mg/dL    Bun/Cre Ratio NOT REPORTED 9 - 20    Calcium 8.6 8.6 - 10.4 mg/dL    Sodium 128 (L) 135 - 144 mmol/L Potassium 4.5 3.7 - 5.3 mmol/L    Chloride 96 (L) 98 - 107 mmol/L    CO2 23 20 - 31 mmol/L    Anion Gap 9 9 - 17 mmol/L    GFR Non-African American >60 >60 mL/min    GFR African American >60 >60 mL/min    GFR Comment          GFR Staging NOT REPORTED    Magnesium    Collection Time: 07/13/21  7:28 AM   Result Value Ref Range    Magnesium 2.0 1.6 - 2.6 mg/dL     No results for input(s): POCGLU in the last 72 hours. CT ABDOMEN PELVIS W IV CONTRAST Additional Contrast? None    Result Date: 7/7/2021  EXAMINATION: CT OF THE ABDOMEN AND PELVIS WITH CONTRAST 7/7/2021 7:51 pm TECHNIQUE: CT of the abdomen and pelvis was performed with the administration of intravenous contrast. Multiplanar reformatted images are provided for review. Dose modulation, iterative reconstruction, and/or weight based adjustment of the mA/kV was utilized to reduce the radiation dose to as low as reasonably achievable. COMPARISON: 04/19/2017 HISTORY: ORDERING SYSTEM PROVIDED HISTORY: Abdo pain, hx SBO TECHNOLOGIST PROVIDED HISTORY: Abdo pain, hx SBO Decision Support Exception - unselect if not a suspected or confirmed emergency medical condition->Emergency Medical Condition (MA) Reason for Exam: pt c/o abd pain x 1 month, pt unable to raise arms above head for CT exam. FINDINGS: Lower Chest: Heart size is normal.  Three-vessel calcific coronary artery disease. Small hiatal hernia. Lung bases are clear. Organs: Cholelithiasis with porcelain gallbladder. No pericholecystic fluid fat stranding. No change from prior study. The liver, spleen, pancreas and kidneys are normal.  Small nodular contour of the bilateral adrenal glands, unchanged. GI/Bowel: Interval partial sigmoidectomy. A few diverticula remain proximal to the sigmoid anastomosis. No evidence of diverticulitis. The appendix is normal.  No bowel obstruction. Pelvis: Urinary bladder is unremarkable. The uterus is surgically absent.  Peritoneum/Retroperitoneum: There is no adenopathy, free air or free fluid. There is a retroaortic left renal vein. Calcific aorto iliac atherosclerotic disease with no evidence of an aneurysm. Bones/Soft Tissues: Unchanged/remote compression fractures of T12 and L5. No acute bone finding. No acute finding in the abdomen or pelvis. Cholelithiasis with porcelain gallbladder. No pericholecystic fluid or fat stranding to suggest acute cholecystitis. Interval partial sigmoidectomy. Few diverticular main proximal to the sigmoid anastomosis with no evidence of diverticulitis. Unchanged/remote T12 and L5 compression fractures. CT CHEST PULMONARY EMBOLISM W CONTRAST    Result Date: 7/8/2021  EXAMINATION: CTA OF THE CHEST 7/8/2021 11:13 am TECHNIQUE: CTA of the chest was performed after the administration of intravenous contrast.  Multiplanar reformatted images are provided for review. MIP images are provided for review. Dose modulation, iterative reconstruction, and/or weight based adjustment of the mA/kV was utilized to reduce the radiation dose to as low as reasonably achievable. COMPARISON: CT abdomen and pelvis yesterday. HISTORY: ORDERING SYSTEM PROVIDED HISTORY: chest pain rule out aaa ,pe TECHNOLOGIST PROVIDED HISTORY: chest pain rule out aaa ,pe Reason for Exam: chest pain rule out aaa ,pe Acuity: Unknown Type of Exam: Unknown Additional signs and symptoms: pt states chest/stomach pain Relevant Medical/Surgical History: no surgeries to area FINDINGS: Pulmonary Arteries: Pulmonary arteries are adequately opacified for evaluation. No evidence of intraluminal filling defect to suggest pulmonary embolism. Main pulmonary artery is normal in caliber. Mediastinum: No evidence of mediastinal lymphadenopathy. The heart and pericardium demonstrate no acute abnormality. There is no acute abnormality of the thoracic aorta. Lungs/pleura: No acute airspace disease identified.   Mosaic attenuation of the lung parenchyma, likely due to regional air trapping. Sequela of old granulomatous disease. No effusion. The central airway is patent. Upper Abdomen: Cholelithiasis. Small hiatal hernia. Soft Tissues/Bones: No acute findings. Chronic T12 compression deformity. 1.  No evidence of pulmonary embolism or acute pulmonary abnormality. 2.  No thoracic aortic aneurysm or acute aortic abnormality appreciated. 3.  Cholelithiasis. HPI:     Overnight no fever chills no nausea vomiting tolerating diet denies any chest pain abdominal pain patient is refusing to go back to live with her sister wants a place to be found for her    Review of Systems:     Constitutional:  negative for chills, fevers, sweats  Respiratory:  negative for cough, dyspnea on exertion, hemoptysis, shortness of breath, wheezing  Cardiovascular:  negative for chest pain, chest pressure/discomfort, lower extremity edema, palpitations  Gastrointestinal:  negative for abdominal pain, constipation, diarrhea, nausea, vomiting  Neurological:  negative for dizziness, headache  Data:     Past Medical History:  no change     Social History:  no change    Family History: @no change    Vitals:      I/O (24Hr):     Intake/Output Summary (Last 24 hours) at 7/13/2021 1131  Last data filed at 7/13/2021 0523  Gross per 24 hour   Intake 285 ml   Output 750 ml   Net -465 ml       Labs:    URINE ANALYSIS: No results found for: LABURIN     CBC:  Lab Results   Component Value Date    WBC 4.7 07/13/2021    HGB 12.5 07/13/2021     07/13/2021     05/04/2012        BMP:    Lab Results   Component Value Date     07/13/2021    K 4.5 07/13/2021    CL 96 07/13/2021    CO2 23 07/13/2021    BUN 22 07/13/2021    CREATININE 0.79 07/13/2021    GLUCOSE 96 07/13/2021    GLUCOSE 96 05/04/2012      LIVER PROFILE:  Lab Results   Component Value Date    ALT 9 07/07/2021    AST 15 07/07/2021    PROT 7.3 07/07/2021    BILITOT 0.41 07/07/2021    BILIDIR <0.08 10/16/2015    LABALBU 4.4 07/07/2021    LABALBU 3.9 2012               Radiology:  Medications: Allergies:      Current Meds:   Scheduled Meds:    hydrALAZINE  50 mg Oral 3 times per day    sodium chloride  1 g Oral TID WC    enoxaparin  30 mg Subcutaneous BID    sodium chloride flush  5-40 mL Intravenous 2 times per day    famotidine (PEPCID) injection  20 mg Intravenous BID    OXcarbazepine  300 mg Oral BID     Continuous Infusions:    sodium chloride       PRN Meds: sodium chloride flush, sodium chloride, potassium chloride **OR** potassium alternative oral replacement **OR** potassium chloride, magnesium sulfate, polyethylene glycol, acetaminophen **OR** acetaminophen, ondansetron, morphine **OR** morphine      Physical Examination:        BP (!) 164/57   Pulse 78   Temp 97.5 °F (36.4 °C)   Resp 18   Ht 5' 6\" (1.676 m)   Wt 229 lb 11.5 oz (104.2 kg)   SpO2 96%   BMI 37.08 kg/m²   Temp (24hrs), Av.9 °F (36.6 °C), Min:97.5 °F (36.4 °C), Max:98.2 °F (36.8 °C)    No results for input(s): POCGLU in the last 72 hours. Intake/Output Summary (Last 24 hours) at 2021 1131  Last data filed at 2021 0523  Gross per 24 hour   Intake 285 ml   Output 750 ml   Net -465 ml       General Appearance:  alert, well appearing, and in no acute distress  Mental status:   Head:  normocephalic, atraumatic. Eye: no icterus, redness, pupils equal and reactive, extraocular eye movements intact, conjunctiva clear  Ear: normal external ear, no discharge, hearing intact  Nose:  no drainage noted  Mouth: mucous membranes moist  No teeth and mouth  Neck: supple, no carotid bruits, thyroid not palpable  Lungs: Bilateral equal air entry, clear to ausculation, no wheezing, rales or rhonchi, normal effort  Cardiovascular: normal rate, regular rhythm, no murmur, gallop, rub.   Abdomen: Soft, nontender, nondistended, normal bowel sounds, no hepatomegaly or splenomegaly  Neurologic: There are no new focal motor or sensory deficits,  Frequent tongues and lip smacking likely part of Parkinson's disease  Skin: No gross lesions, rashes, bruising or bleeding on exposed skin area  Extremities:  peripheral pulses palpable, no pedal edema or calf pain with palpation              Assessment:        Primary Problem  Hyponatremia    Active Hospital Problems    Diagnosis Date Noted    SIADH (syndrome of inappropriate ADH production) ([address] [E22.2]     History of small bowel obstruction [Z87.19] 07/08/2021    Chronic abdominal pain [R10.9, G89.29] 07/08/2021    Hyponatremia [E87.1] 07/07/2021    Seizures ([address] [R56.9] 12/19/2011    History of gallstones [Z87.19] 12/19/2011    Peptic ulcer [K27.9] 12/19/2011     Plan:        68-year-old morbidly obese lady class II BMI 28 lives with a sister admitted with abdominal pain chest pain CT scan showed porcelain gallbladder but nil acute  Routine labs revealed severe hyponatremia 118 likely secondary to decreased oral intake check for urine plasma osmolality urine sodium gentle hydration nephrology input requested  We will have the staff call the family patient lives with sister and has Parkinson's disease poor historian  Chest pain atypical in nature 4 months will order CTA rule out PE or dissection will review with results             July 11  Hyponatremia is improving  Abdominal chest pain resolved CTA chest no dissection no PE noted  Porcelain gallbladder high 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 Clinically doing ok   precert in progress   1. Hyponatremia - Patient is clinically euvolemic and hyponatremia in this patient is consistent with SIADH. This may be secondary to Oxcarbazepine[Trileptal]. Serum sodium is stable at 132 mmol/L. Plan: Continue fluid restriction 1500 mL/day.   Check serum sodium level every 8 hours with target rate of correction of sodium not to exceed 0.5 mmol/L/h [maximum 8 mmol over 24 hours].     2. Systemic hypertension with sub optimal BP control. Hydralazine increased to 50 mg p.o. 3 times daily. Prognosis is guarded as per Nephro note.        Roberto Xie MD  7/13/2021  11:31 AM     Attestation and add on       I have discussed the care of Rehana Bonner , including pertinent history and exam findings,      7/13/21    with the resident. I have seen and examined the patient and the key elements of all parts of the encounter have been performed by me . I agree with the assessment, plan and orders as documented by the resident.     -Clinically much improved  Has tardive dyskinesias  Alert and coherent  Mild hyponatremia  Secondary to Trileptal  SIADH  On salt tablets and fluid restriction  Blood pressure control improving  Hydralazine increased    Discharge planning in progress  Intermittent pain abdomen no acute abdominal--- ;  Patient is ready for discharge once pre-CERT obtained     MD ANTONIO Perez[address]    Phone [phone]   Fax: [phone]  Answering Service: [phone]

## 2021-07-13 NOTE — PROGRESS NOTES
SW spoke to pt's guardian Keagan Colvin who was having second thoughts about sending pt to SNF. SW spoke to pt per guardians request. Pt said she wants to go to rehab. Pt had her phone off the hook as she said she does not want to talk to her sister. SW relayed this message to Keagan Colvin. Keagan Colvin said he is obligated to see pt in person and will stop by the hospital today. Still await pre-cert from [facility]. Addendum: Margarita Sullivan did see pt. Pt confirmed she wants to go to [facility]. Also per Ronak Valencia at [facility], Insurance is requesting an updated therapy note. SW left message for therapy to see pt asap.

## 2021-07-13 NOTE — PROGRESS NOTES
Physical Therapy        Physical Therapy Cancel Note      DATE: 2021    NAME: Anna Miller  MRN: [id number]   : 1950      Patient not seen this date for Physical Therapy due to:    Patient is not appropriate for PT evaluation/treatment at this time d/t elevated BP, per TOMMY LINDER. Will check back later this date time permitting.        Electronically signed by Timo Steven PTA on 2021 at 10:07 AM

## 2021-07-13 NOTE — PLAN OF CARE
Problem: Falls - Risk of:  Goal: Will remain free from falls  Description: Will remain free from falls  7/13/2021 0403 by China Carrion RN  Outcome: Ongoing     Problem: Falls - Risk of:  Goal: Absence of physical injury  Description: Absence of physical injury  7/13/2021 0403 by China Carrion RN  Outcome: Ongoing     Problem: Infection:  Goal: Will remain free from infection  Description: Will remain free from infection  Outcome: Ongoing     Problem: Safety:  Goal: Free from accidental physical injury  Description: Free from accidental physical injury  Outcome: Ongoing     Problem: Safety:  Goal: Free from intentional harm  Description: Free from intentional harm  Outcome: Ongoing     Problem: Daily Care:  Goal: Daily care needs are met  Description: Daily care needs are met  Outcome: Ongoing

## 2021-07-13 NOTE — PROGRESS NOTES
Department of Internal Medicine  Nephrology Tera Pierre MD  Progress Note    Reason for consultation: Management of hyponatremia. Consulting physician: Juan R Soliman. Asmita Flowers MD.    Interval history: Patient was seen and examined today and she complains of abdominal pain/dyspepsia. She does not have nausea or vomiting. Laboratory studies were reviewed and serum sodium is 128 mmol/L today. History of present illness: This is a 65 y.o. female with a significant past medical history of Bipolar disorder, hyperlipidemia, chronic obstructive pulmonary disease and systemic hypertension, who presented to the emergency department yesterday with complaints of Acute on chronic abdominal pain of 4 weeks duration. There was no associated fever, chills, nausea, vomiting, constipation or diarrhea. She did not have any seizures. Laboratory studies were remarkable for serum sodium 119 mmol/L and hence nephrology consultation. Home medications Trileptal and Keppra.      Scheduled Meds:   enoxaparin  30 mg Subcutaneous BID    hydrALAZINE  25 mg Oral 3 times per day    sodium chloride flush  5-40 mL Intravenous 2 times per day    famotidine (PEPCID) injection  20 mg Intravenous BID    OXcarbazepine  300 mg Oral BID     Continuous Infusions:   sodium chloride       PRN Meds:.sodium chloride flush, sodium chloride, potassium chloride **OR** potassium alternative oral replacement **OR** potassium chloride, magnesium sulfate, polyethylene glycol, acetaminophen **OR** acetaminophen, ondansetron, morphine **OR** morphine    Physical Exam:    VITALS:  BP (!) 164/57   Pulse 78   Temp 97.5 °F (36.4 °C)   Resp 18   Ht 5' 6\" (1.676 m)   Wt 229 lb 11.5 oz (104.2 kg)   SpO2 96%   BMI 37.08 kg/m²   24HR INTAKE/OUTPUT:      Intake/Output Summary (Last 24 hours) at 7/13/2021 1004  Last data filed at 7/13/2021 0523  Gross per 24 hour   Intake 285 ml   Output 750 ml   Net -465 ml     Constitutional: alert, appears stated age and cooperative    Skin: No rash but evidence of hirsutism. Head: Normocephalic, without obvious abnormality, atraumatic     Cardiovascular/Edema: regular rate and rhythm, S1, S2 normal, no murmur, click, rub or gallop    Respiratory: Lungs: clear to auscultation bilaterally    Abdomen: soft, non-tender; bowel sounds normal; no masses,  no organomegaly    Back: symmetric, no curvature. ROM normal. No CVA tenderness. Extremities: extremities normal, atraumatic, no cyanosis or edema    Neuro:  Involuntary movements especially of the muscles of mastication consistent with tardive dyskinesia    CBC:   Recent Labs     07/11/21  0539 07/12/21  0546 07/13/21  0622   WBC 5.4 4.3 4.7   HGB 12.0 12.0 12.5    215 280     BMP:    Recent Labs     07/11/21  0539 07/11/21  1340 07/12/21  0546 07/12/21  0546 07/12/21  1335 07/12/21  2131 07/13/21  0728   *   < > 128*   < > 131* 127* 128*   K 4.3  --  4.3  --   --   --  4.5   CL 99  --  96*  --   --   --  96*   CO2 24  --  24  --   --   --  23   BUN 14  --  18  --   --   --  22   CREATININE 0.76  --  0.73  --   --   --  0.79   GLUCOSE 96  --  95  --   --   --  96    < > = values in this interval not displayed. Lab Results   Component Value Date    NITRU NEGATIVE 07/07/2021    COLORU YELLOW 07/07/2021    PHUR 6.5 07/07/2021    WBCUA 0 TO 2 07/07/2021    RBCUA 0 TO 2 07/07/2021    MUCUS NOT REPORTED 07/07/2021    TRICHOMONAS NOT REPORTED 07/07/2021    YEAST NOT REPORTED 07/07/2021    BACTERIA FEW 07/07/2021    SPECGRAV 1.049 07/07/2021    LEUKOCYTESUR NEGATIVE 07/07/2021    UROBILINOGEN Normal 07/07/2021    BILIRUBINUR NEGATIVE 07/07/2021    BILIRUBINUR NEGATIVE 05/04/2012    GLUCOSEU NEGATIVE 07/07/2021    GLUCOSEU NEGATIVE 05/04/2012    KETUA NEGATIVE 07/07/2021    AMORPHOUS NOT REPORTED 07/07/2021     IMPRESSION/RECOMMENDATIONS:      1. Hyponatremia - Patient is clinically euvolemic and hyponatremia in this patient is consistent with SIADH.   This may be secondary to Oxcarbazepine[Trileptal]. Continue fluid restriction 1500 mL/day. Start sodium chloride tablet 1 g p.o. 3 times daily. Check serum sodium levels daily. 2.  Systemic hypertension - Blood pressure control is slightly suboptimal.  Increase hydralazine to 50 mg p.o. 3 times daily. Prognosis is guarded.     Ridge Pizano MD   Attending Nephrologist  7/13/2021 10:04 AM

## 2021-07-14 VITALS
BODY MASS INDEX: 36.92 KG/M2 | HEIGHT: 66 IN | HEART RATE: 110 BPM | DIASTOLIC BLOOD PRESSURE: 55 MMHG | OXYGEN SATURATION: 95 % | RESPIRATION RATE: 18 BRPM | WEIGHT: 229.72 LBS | TEMPERATURE: 98.3 F | SYSTOLIC BLOOD PRESSURE: 160 MMHG

## 2021-07-14 LAB
ANION GAP SERPL CALCULATED.3IONS-SCNC: 8 MMOL/L (ref 9–17)
BUN BLDV-MCNC: 23 MG/DL (ref 8–23)
BUN/CREAT BLD: ABNORMAL (ref 9–20)
CALCIUM SERPL-MCNC: 8.9 MG/DL (ref 8.6–10.4)
CHLORIDE BLD-SCNC: 98 MMOL/L (ref 98–107)
CO2: 25 MMOL/L (ref 20–31)
CREAT SERPL-MCNC: 0.85 MG/DL (ref 0.5–0.9)
GFR AFRICAN AMERICAN: >60 ML/MIN
GFR NON-AFRICAN AMERICAN: >60 ML/MIN
GFR SERPL CREATININE-BSD FRML MDRD: ABNORMAL ML/MIN/{1.73_M2}
GFR SERPL CREATININE-BSD FRML MDRD: ABNORMAL ML/MIN/{1.73_M2}
GLUCOSE BLD-MCNC: 94 MG/DL (ref 70–99)
HCT VFR BLD CALC: 35.6 % (ref 36–46)
HEMOGLOBIN: 11.9 G/DL (ref 12–16)
MAGNESIUM: 2.1 MG/DL (ref 1.6–2.6)
MCH RBC QN AUTO: 30.6 PG (ref 26–34)
MCHC RBC AUTO-ENTMCNC: 33.4 G/DL (ref 31–37)
MCV RBC AUTO: 91.6 FL (ref 80–100)
NRBC AUTOMATED: ABNORMAL PER 100 WBC
PDW BLD-RTO: 13.2 % (ref 11.5–14.9)
PLATELET # BLD: 209 K/UL (ref 150–450)
PMV BLD AUTO: 6.7 FL (ref 6–12)
POTASSIUM SERPL-SCNC: 4.7 MMOL/L (ref 3.7–5.3)
RBC # BLD: 3.89 M/UL (ref 4–5.2)
SODIUM BLD-SCNC: 131 MMOL/L (ref 135–144)
WBC # BLD: 9.1 K/UL (ref 3.5–11)

## 2021-07-14 PROCEDURE — 97530 THERAPEUTIC ACTIVITIES: CPT

## 2021-07-14 PROCEDURE — 97535 SELF CARE MNGMENT TRAINING: CPT

## 2021-07-14 PROCEDURE — 83735 ASSAY OF MAGNESIUM: CPT

## 2021-07-14 PROCEDURE — 85027 COMPLETE CBC AUTOMATED: CPT

## 2021-07-14 PROCEDURE — 2500000003 HC RX 250 WO HCPCS: Performed by: NURSE PRACTITIONER

## 2021-07-14 PROCEDURE — 99239 HOSP IP/OBS DSCHRG MGMT >30: CPT | Performed by: INTERNAL MEDICINE

## 2021-07-14 PROCEDURE — 6370000000 HC RX 637 (ALT 250 FOR IP): Performed by: NURSE PRACTITIONER

## 2021-07-14 PROCEDURE — 6370000000 HC RX 637 (ALT 250 FOR IP): Performed by: INTERNAL MEDICINE

## 2021-07-14 PROCEDURE — 80048 BASIC METABOLIC PNL TOTAL CA: CPT

## 2021-07-14 PROCEDURE — 6360000002 HC RX W HCPCS: Performed by: NURSE PRACTITIONER

## 2021-07-14 PROCEDURE — 36415 COLL VENOUS BLD VENIPUNCTURE: CPT

## 2021-07-14 PROCEDURE — 2580000003 HC RX 258: Performed by: NURSE PRACTITIONER

## 2021-07-14 RX ORDER — HYDRALAZINE HYDROCHLORIDE 50 MG/1
50 TABLET, FILM COATED ORAL EVERY 8 HOURS SCHEDULED
Qty: 90 TABLET | Refills: 3 | Status: SHIPPED | OUTPATIENT
Start: 2021-07-14 | End: 2022-01-01

## 2021-07-14 RX ORDER — POLYETHYLENE GLYCOL 3350 17 G/17G
17 POWDER, FOR SOLUTION ORAL DAILY PRN
Qty: 527 G | Refills: 1 | Status: SHIPPED | OUTPATIENT
Start: 2021-07-14 | End: 2021-08-13

## 2021-07-14 RX ORDER — LORAZEPAM 0.5 MG/1
0.5 TABLET ORAL EVERY 6 HOURS PRN
Qty: 10 TABLET | Refills: 0 | Status: SHIPPED | OUTPATIENT
Start: 2021-07-14 | End: 2021-08-13

## 2021-07-14 RX ORDER — SODIUM CHLORIDE 1000 MG
1 TABLET, SOLUBLE MISCELLANEOUS
Qty: 90 TABLET | Refills: 3 | Status: SHIPPED | OUTPATIENT
Start: 2021-07-14 | End: 2022-01-01

## 2021-07-14 RX ADMIN — ENOXAPARIN SODIUM 30 MG: 30 INJECTION SUBCUTANEOUS at 10:03

## 2021-07-14 RX ADMIN — ACETAMINOPHEN 650 MG: 325 TABLET ORAL at 10:04

## 2021-07-14 RX ADMIN — SODIUM CHLORIDE 1 G: 1 TABLET ORAL at 10:04

## 2021-07-14 RX ADMIN — SODIUM CHLORIDE, PRESERVATIVE FREE 10 ML: 5 INJECTION INTRAVENOUS at 10:07

## 2021-07-14 RX ADMIN — FAMOTIDINE 20 MG: 10 INJECTION, SOLUTION INTRAVENOUS at 10:05

## 2021-07-14 RX ADMIN — OXCARBAZEPINE 300 MG: 300 TABLET, FILM COATED ORAL at 10:04

## 2021-07-14 RX ADMIN — HYDRALAZINE HYDROCHLORIDE 50 MG: 50 TABLET, FILM COATED ORAL at 11:22

## 2021-07-14 RX ADMIN — SODIUM CHLORIDE 1 G: 1 TABLET ORAL at 12:43

## 2021-07-14 ASSESSMENT — PAIN DESCRIPTION - LOCATION
LOCATION: BACK
LOCATION: BACK

## 2021-07-14 ASSESSMENT — PAIN DESCRIPTION - DESCRIPTORS
DESCRIPTORS: ACHING
DESCRIPTORS: ACHING

## 2021-07-14 ASSESSMENT — PAIN DESCRIPTION - PAIN TYPE
TYPE: ACUTE PAIN
TYPE: ACUTE PAIN

## 2021-07-14 ASSESSMENT — PAIN SCALES - GENERAL
PAINLEVEL_OUTOF10: 8

## 2021-07-14 ASSESSMENT — PAIN DESCRIPTION - ORIENTATION
ORIENTATION: LOWER
ORIENTATION: LOWER

## 2021-07-14 NOTE — PLAN OF CARE
Nutrition Problem #1: In context of acute illness or injury  Intervention: Food and/or Nutrient Delivery: Continue Current Diet  Nutritional Goals: Meet greater than 75% of estimated nutrition needs

## 2021-07-14 NOTE — PLAN OF CARE
Problem: Falls - Risk of:  Goal: Will remain free from falls  Description: Will remain free from falls  7/14/2021 0241 by Markus Park RN  Outcome: Met This Shift     Problem: Infection:  Goal: Will remain free from infection  Description: Will remain free from infection  7/14/2021 0241 by Markus Park RN  Outcome: Met This Shift     Problem: Safety:  Goal: Free from accidental physical injury  Description: Free from accidental physical injury  7/14/2021 0241 by Markus Park RN  Outcome: Met This Shift     Problem: Skin Integrity:  Goal: Skin integrity will stabilize  Description: Skin integrity will stabilize  7/14/2021 0241 by Markus Park RN  Outcome: Met This Shift     Problem: Pain:  Goal: Patient's pain/discomfort is manageable  Description: Patient's pain/discomfort is manageable  7/14/2021 0241 by Markus Park RN  Outcome: Ongoing  Note: Pt continues to state of pain despite receiving pain medications, continue to monitor     Problem: Musculor/Skeletal Functional Status  Goal: Highest potential functional level  7/14/2021 0241 by Markus Park RN  Outcome: Ongoing  Note: Pt with generalized weakness, able to position self in bed

## 2021-07-14 NOTE — PROGRESS NOTES
[address]   INPATIENT OCCUPATIONAL THERAPY  PROGRESS NOTE  Date: 2021  Patient Name: David Kimbrough      Room: 7032/3291-99  MRN: [id number]    : 1950  (71 y.o.) Gender: female     Discharge Recommendations:  Further Occupational Therapy is recommended upon facility discharge. OT Equipment Recommendations  Equipment Needed: No  Other: plans to go to facility       Diagnosis: admitted 19 with abdominal pain chest pain CT scan showed porcelain gallbladder but nil acute, Hyponatremia - Patient is clinically euvolemic and hyponatremia in this patient is consistent with SIADH. systemic hypertension  General  Patient assessed for rehabilitation services?: Yes  Diagnosis: admitted 21 with abdominal pain chest pain CT scan showed porcelain gallbladder but nil acute, Hyponatremia - Patient is clinically euvolemic and hyponatremia in this patient is consistent with SIADH. systemic hypertension    Restrictions  Restrictions/Precautions: General Precautions, Contact Precautions, Fall Risk, Seizure  Implants present? :  (Pt denies)  Other position/activity restrictions: fluid restriction 1500 mL/day. Required Braces or Orthoses?: No      Subjective  Subjective: pt very talkative. Comments: pt alert  Patient Currently in Pain: Yes  Pain Level: 8  Pain Location: Back  Pain Orientation: Lower  Overall Orientation Status: Within Functional Limits     Pain Assessment  Pain Assessment: 0-10  Pain Level: 8  Pain Type: Acute pain  Pain Location: Back  Pain Orientation: Lower  Pain Descriptors: Aching  Response to Pain Intervention: Patient Satisfied    Objective  Cognition  Overall Cognitive Status: Impaired  Following Directions:  Follows one step commands (with VCs)  Safety Judgement: Decreased awareness of need for safety  Bed mobility  Supine to Sit: Stand by assistance  Sit to Supine: Contact guard assistance  Scooting: Stand by assistance  Comment: VCs to utilize bed rails for ease  Balance  Sitting Balance: Supervision (lalita 20 min with good dynamic/static balance without UE support needed)  Standing Balance: Contact guard assistance (c BUE support on RW)  Standing Balance  Time: 2 min  Activity: static stand, 4 steps L<>R  Comment: unsteady but no LOB noted  Functional Mobility  Functional - Mobility Device: Rolling Walker  Activity:  (static stand, 4 steps L<>R)  Assist Level: Contact guard assistance  Functional Mobility Comments: unsteady but no LOB noted   ADL  Feeding: Setup  Grooming: Setup; Increased time to complete  UE Bathing: Setup;Minimal assistance  LE Bathing: Increased time to complete;Setup; Moderate assistance (pt wash BLE by bringing foot to opposite knee, TAforbuttocks)  UE Dressing: Setup; Moderate assistance  LE Dressing: Setup; Increased time to complete (pt marbin/doff socks by bringing foot to opposite knee)  Toileting: Maximum assistance  Additional Comments: pt completes ADLs sitting EOB, pt req extended time for all tasks 2* pt requiring encouragement for pt to complete thorough task. no LOB noted during dynamic reaching or bending forward. Transfers  Sit to stand: Minimal assistance  Stand to sit: Contact guard assistance  Transfer Comments: VCs for hand placement, pt demo P carryover, pt impulsive, pt sits without notice                             Assessment  Performance deficits / Impairments: Decreased ADL status; Decreased balance;Decreased strength;Decreased endurance;Decreased functional mobility   Prognosis: Good  Activity Tolerance: Patient Tolerated treatment well  Safety Devices in place: Yes  Type of devices: Bed alarm in place; Left in bed;Call light within reach (sister in room as writer departs)             Patient Education: OT POC, bed mobility, safety with transfers, activity promotion     Learner:patient  Method: demonstration and explanation       Outcome: acknowledged understanding and needs reinforcement     Plan  Safety Devices  Safety Devices in place: Yes  Type of devices: Bed alarm in place, Left in bed, Call light within reach (sister in room as writer departs)  Plan  Times per week: 4-5  Times per day: Daily  Current Treatment Recommendations: Strengthening, Safety Education & Training, Balance Training, Patient/Caregiver Education & Training, Self-Care / ADL, Functional Mobility Training, Endurance Training      Goals  Short term goals  Time Frame for Short term goals: 1 week  Short term goal 1: lalita 6-8 min stand with BUE support and SBA for adls  Short term goal 2: min of 2 adl transfers with RW  Short term goal 3: set up UE bathe and dress  Short term goal 4: min LE bathe and dress  Short term goal 5: lalita 10 reps x 4 BUE shoulder AROM with mod resistance for work lalita, strength    OT Individual Minutes  Time In: [facility]  Time Out: [address] [place] [address]  Minutes: 28      Electronically signed by SANTOSH Giordano on 7/14/21 at 1:05 PM KEVINT

## 2021-07-14 NOTE — PROGRESS NOTES
Pt calls out for pain medications frequently, pt not grimacing and watching TV when nurse is in room, will continue to monitor, VSS

## 2021-07-14 NOTE — PROGRESS NOTES
Dr Lara Orona rounded and discharged patient. Patient states she does not want to go to SNF. Sister, Haylee Saeed, present at bedside and states Danyell Camarena is going home with me, I take good care of her\". She states \"I am certified, I'm a \". Patient and sister educated that therapy is recommending therapy at a rehab facility due to weakness. [address] notified and will speak with the patient.  Electronically signed by Maybelle Sacks, RN on 7/14/2021 at 11:07 AM

## 2021-07-14 NOTE — PLAN OF CARE
Problem: Falls - Risk of:  Goal: Will remain free from falls  Description: Will remain free from falls  7/14/2021 1405 by Tania Hanson RN  Outcome: Completed  7/14/2021 0241 by Uday Johnson RN  Outcome: Met This Shift  Goal: Absence of physical injury  Description: Absence of physical injury  Outcome: Completed     Problem: Infection:  Goal: Will remain free from infection  Description: Will remain free from infection  7/14/2021 1405 by Tania Hanson RN  Outcome: Completed  7/14/2021 0241 by Uday Johnson RN  Outcome: Met This Shift     Problem: Safety:  Goal: Free from accidental physical injury  Description: Free from accidental physical injury  7/14/2021 1405 by Tania Hanson RN  Outcome: Completed  7/14/2021 0241 by Uday Johnson RN  Outcome: Met This Shift  Goal: Free from intentional harm  Description: Free from intentional harm  Outcome: Completed     Problem: Daily Care:  Goal: Daily care needs are met  Description: Daily care needs are met  Outcome: Completed     Problem: Pain:  Goal: Patient's pain/discomfort is manageable  Description: Patient's pain/discomfort is manageable  7/14/2021 1405 by Tania Hanson RN  Outcome: Completed  7/14/2021 0241 by Uday Johnson RN  Outcome: Ongoing  Note: Pt continues to state of pain despite receiving pain medications, continue to monitor  Goal: Pain level will decrease  Description: Pain level will decrease  Outcome: Completed  Goal: Control of acute pain  Description: Control of acute pain  Outcome: Completed  Goal: Control of chronic pain  Description: Control of chronic pain  Outcome: Completed     Problem: Discharge Planning:  Goal: Patients continuum of care needs are met  Description: Patients continuum of care needs are met  Outcome: Completed     Problem: Skin Integrity:  Goal: Will show no infection signs and symptoms  Description: Will show no infection signs and symptoms  Outcome: Completed  Goal: Absence of new skin breakdown  Description: Absence of new skin breakdown  Outcome: Completed     Problem: Musculor/Skeletal Functional Status  Goal: Highest potential functional level  7/14/2021 1405 by Nancy Stuart RN  Outcome: Completed  7/14/2021 0241 by Jeovanny De León RN  Outcome: Ongoing  Note: Pt with generalized weakness, able to position self in bed  Goal: Absence of falls  Outcome: Completed     Problem: Nutrition  Goal: Optimal nutrition therapy  Outcome: Completed

## 2021-07-14 NOTE — PROGRESS NOTES
SW spoke to pt and sister. Pt now wants to go home with sister. SW did confirm this plan with pt's guardian Maribel Parr. Pt's sister said she has everything she needs and declined home care.

## 2021-07-14 NOTE — PROGRESS NOTES
Department of Internal Medicine  Nephrology Art Guzman MD  Progress Note    Reason for consultation: Management of hyponatremia. Consulting physician: Jermaine Watson. John Toledo MD.    Interval history: Patient does not have any new complaints today. Sister was present throughout the whole encounter and had multiple questions that were satisfactorily answered by me. Laboratory studies were reviewed and serum sodium is 131 mmol/L today. History of present illness: This is a 65 y.o. female with a significant past medical history of Bipolar disorder, hyperlipidemia, chronic obstructive pulmonary disease and systemic hypertension, who presented to the emergency department yesterday with complaints of Acute on chronic abdominal pain of 4 weeks duration. There was no associated fever, chills, nausea, vomiting, constipation or diarrhea. She did not have any seizures. Laboratory studies were remarkable for serum sodium 119 mmol/L and hence nephrology consultation. Home medications Trileptal and Keppra. Scheduled Meds:   hydrALAZINE  50 mg Oral 3 times per day    sodium chloride  1 g Oral TID WC    enoxaparin  30 mg Subcutaneous BID    sodium chloride flush  5-40 mL Intravenous 2 times per day    famotidine (PEPCID) injection  20 mg Intravenous BID    OXcarbazepine  300 mg Oral BID     Continuous Infusions:   sodium chloride       PRN Meds:. LORazepam, sodium chloride flush, sodium chloride, potassium chloride **OR** potassium alternative oral replacement **OR** potassium chloride, magnesium sulfate, polyethylene glycol, acetaminophen **OR** acetaminophen, ondansetron, morphine **OR** morphine    Physical Exam:    VITALS:  BP (!) 150/62   Pulse 94   Temp 97.7 °F (36.5 °C) (Oral)   Resp 16   Ht 5' 6\" (1.676 m)   Wt 229 lb 11.5 oz (104.2 kg)   SpO2 96%   BMI 37.08 kg/m²   24HR INTAKE/OUTPUT:      Intake/Output Summary (Last 24 hours) at 7/14/2021 1058  Last data filed at 7/14/2021 0644  Gross per 24 hour   Intake 240 ml   Output 1250 ml   Net -1010 ml     Constitutional: alert, appears stated age and cooperative    Skin: No rash but evidence of hirsutism. Head: Normocephalic, without obvious abnormality, atraumatic     Cardiovascular/Edema: regular rate and rhythm, S1, S2 normal, no murmur, click, rub or gallop    Respiratory: Lungs: clear to auscultation bilaterally    Abdomen: soft, non-tender; bowel sounds normal; no masses,  no organomegaly    Back: symmetric, no curvature. ROM normal. No CVA tenderness. Extremities: extremities normal, atraumatic, no cyanosis or edema    Neuro:  Involuntary movements especially of the muscles of mastication consistent with tardive dyskinesia    CBC:   Recent Labs     07/12/21  0546 07/13/21  0622 07/14/21  0556   WBC 4.3 4.7 9.1   HGB 12.0 12.5 11.9*    280 209     BMP:    Recent Labs     07/12/21  0546 07/12/21  1335 07/12/21  2131 07/13/21  0728 07/14/21  0556   *   < > 127* 128* 131*   K 4.3  --   --  4.5 4.7   CL 96*  --   --  96* 98   CO2 24  --   --  23 25   BUN 18  --   --  22 23   CREATININE 0.73  --   --  0.79 0.85   GLUCOSE 95  --   --  96 94    < > = values in this interval not displayed. Lab Results   Component Value Date    NITRU NEGATIVE 07/07/2021    COLORU YELLOW 07/07/2021    PHUR 6.5 07/07/2021    WBCUA 0 TO 2 07/07/2021    RBCUA 0 TO 2 07/07/2021    MUCUS NOT REPORTED 07/07/2021    TRICHOMONAS NOT REPORTED 07/07/2021    YEAST NOT REPORTED 07/07/2021    BACTERIA FEW 07/07/2021    SPECGRAV 1.049 07/07/2021    LEUKOCYTESUR NEGATIVE 07/07/2021    UROBILINOGEN Normal 07/07/2021    BILIRUBINUR NEGATIVE 07/07/2021    BILIRUBINUR NEGATIVE 05/04/2012    GLUCOSEU NEGATIVE 07/07/2021    GLUCOSEU NEGATIVE 05/04/2012    KETUA NEGATIVE 07/07/2021    AMORPHOUS NOT REPORTED 07/07/2021     IMPRESSION/RECOMMENDATIONS:      1. Hyponatremia - Patient is clinically euvolemic and hyponatremia in this patient is consistent with SIADH.   This may be secondary to Oxcarbazepine[Trileptal]. Continue fluid restriction 1500 mL/day. Continue sodium chloride tablet 1 g p.o. 3 times daily. Check serum sodium levels daily. 2.  Systemic hypertension - Blood pressure control is acceptable on hydralazine 50 mg p.o. 3 times daily. Prognosis is guarded.     Nima De Leon MD FACP  Attending Nephrologist  7/14/2021 10:58 AM

## 2021-07-14 NOTE — PROGRESS NOTES
[place] [address] Internal Medicine    Progress Note  Patient assessed for rehabilitation services?: Yes  7/14/2021    10:49 AM    Name:   Kar Carson  MRN:     [id number]     Acct:      [de-identified]   Room:   2063/2063-01  IP Day:  7  Admit Date:  7/7/2021  6:15 PM    PCP:   ANDRE Bustamante CNP  Code Status:  Full Code    Subjective:     C/C:   Chief Complaint   Patient presents with    Abdominal Pain     x1 month     Principal Problem:    Hyponatremia  Active Problems:    History of gallstones    Peptic ulcer    Seizures ([address]    HTN (hypertension)    History of small bowel obstruction    Chronic abdominal pain    SIADH (syndrome of inappropriate ADH production) [facility] [address]    Other drug induced secondary parkinsonism ([facility] [address]  Resolved Problems:    * No resolved hospital problems. *    77-year-old morbidly obese lady class II BMI 28 lives with a sister admitted with abdominal pain chest pain CT scan showed porcelain gallbladder but nil acute  Routine labs revealed severe hyponatremia 118 likely secondary to decreased oral intake check for urine plasma osmolality urine sodium gentle hydration nephrology input requested  We will have the staff call the family patient lives with sister and has Parkinson's disease poor historian  Chest pain atypical in nature 4 months will order CTA rule out PE or dissection will review with results          7/13/21   Pt c/o pain abdomen radiating to back. Getting pain medications. Denies any nausea, vomiting. She remained afebrile, BP remained high overnight. Hydralazine increased to 50 mg from 25    Pt tolerating PO feed, good urine output. Didn't have BM, will add bowel regimen. Labs show persistent Hyponatremia Na 128, nephro started salt tablets 1 gm PO 3 times daily. Recommended fluid restriction 1500 ml/day.      CT abdo pelvis 7/7/21:     Cholelithiasis with porcelain gallbladder.  No pericholecystic fluid or fat   stranding to suggest acute cholecystitis.       Interval partial sigmoidectomy.  Few diverticular main proximal to the   sigmoid anastomosis with no evidence of diverticulitis. Discharge planning, awaiting precert. Appreciate Nephro input. BMP:   Recent Labs     07/13/21  0728 07/14/21  0556   * 131*   K 4.5 4.7   CO2 23 25   BUN 22 23   CREATININE 0.79 0.85   LABGLOM >60 >60   GLUCOSE 96 94                Significant last 24 hr data reviewed ;   Vitals:    07/13/21 1223 07/13/21 2032 07/13/21 2058 07/14/21 0915   BP: (!) 181/52 135/88  (!) 150/62   Pulse: 88 88  94   Resp: 18 16  16   Temp: 97.8 °F (36.6 °C) 98.1 °F (36.7 °C)  97.7 °F (36.5 °C)   TempSrc: Axillary Oral  Oral   SpO2: 96% 94% 98% 96%   Weight:       Height:          Recent Results (from the past 24 hour(s))   Basic Metabolic Panel w/ Reflex to MG    Collection Time: 07/14/21  5:56 AM   Result Value Ref Range    Glucose 94 70 - 99 mg/dL    BUN 23 8 - 23 mg/dL    CREATININE 0.85 0.50 - 0.90 mg/dL    Bun/Cre Ratio NOT REPORTED 9 - 20    Calcium 8.9 8.6 - 10.4 mg/dL    Sodium 131 (L) 135 - 144 mmol/L    Potassium 4.7 3.7 - 5.3 mmol/L    Chloride 98 98 - 107 mmol/L    CO2 25 20 - 31 mmol/L    Anion Gap 8 (L) 9 - 17 mmol/L    GFR Non-African American >60 >60 mL/min    GFR African American >60 >60 mL/min    GFR Comment          GFR Staging NOT REPORTED    CBC    Collection Time: 07/14/21  5:56 AM   Result Value Ref Range    WBC 9.1 3.5 - 11.0 k/uL    RBC 3.89 (L) 4.0 - 5.2 m/uL    Hemoglobin 11.9 (L) 12.0 - 16.0 g/dL    Hematocrit 35.6 (L) 36 - 46 %    MCV 91.6 80 - 100 fL    MCH 30.6 26 - 34 pg    MCHC 33.4 31 - 37 g/dL    RDW 13.2 11.5 - 14.9 %    Platelets 677 232 - 738 k/uL    MPV 6.7 6.0 - 12.0 fL    NRBC Automated NOT REPORTED per 100 WBC   Magnesium    Collection Time: 07/14/21  5:56 AM   Result Value Ref Range    Magnesium 2.1 1.6 - 2.6 mg/dL     No results for input(s): POCGLU in the last 72 hours.      CT ABDOMEN PELVIS W IV CONTRAST Additional Contrast? None    Result Date: 7/7/2021  EXAMINATION: CT OF THE ABDOMEN AND PELVIS WITH CONTRAST 7/7/2021 7:51 pm TECHNIQUE: CT of the abdomen and pelvis was performed with the administration of intravenous contrast. Multiplanar reformatted images are provided for review. Dose modulation, iterative reconstruction, and/or weight based adjustment of the mA/kV was utilized to reduce the radiation dose to as low as reasonably achievable. COMPARISON: 04/19/2017 HISTORY: ORDERING SYSTEM PROVIDED HISTORY: Abdo pain, hx SBO TECHNOLOGIST PROVIDED HISTORY: Abdo pain, hx SBO Decision Support Exception - unselect if not a suspected or confirmed emergency medical condition->Emergency Medical Condition (MA) Reason for Exam: pt c/o abd pain x 1 month, pt unable to raise arms above head for CT exam. FINDINGS: Lower Chest: Heart size is normal.  Three-vessel calcific coronary artery disease. Small hiatal hernia. Lung bases are clear. Organs: Cholelithiasis with porcelain gallbladder. No pericholecystic fluid fat stranding. No change from prior study. The liver, spleen, pancreas and kidneys are normal.  Small nodular contour of the bilateral adrenal glands, unchanged. GI/Bowel: Interval partial sigmoidectomy. A few diverticula remain proximal to the sigmoid anastomosis. No evidence of diverticulitis. The appendix is normal.  No bowel obstruction. Pelvis: Urinary bladder is unremarkable. The uterus is surgically absent. Peritoneum/Retroperitoneum: There is no adenopathy, free air or free fluid. There is a retroaortic left renal vein. Calcific aorto iliac atherosclerotic disease with no evidence of an aneurysm. Bones/Soft Tissues: Unchanged/remote compression fractures of T12 and L5. No acute bone finding. No acute finding in the abdomen or pelvis. Cholelithiasis with porcelain gallbladder. No pericholecystic fluid or fat stranding to suggest acute cholecystitis. Interval partial sigmoidectomy.   Few diverticular main proximal to the sigmoid anastomosis with no evidence of diverticulitis. Unchanged/remote T12 and L5 compression fractures. CT CHEST PULMONARY EMBOLISM W CONTRAST    Result Date: 7/8/2021  EXAMINATION: CTA OF THE CHEST 7/8/2021 11:13 am TECHNIQUE: CTA of the chest was performed after the administration of intravenous contrast.  Multiplanar reformatted images are provided for review. MIP images are provided for review. Dose modulation, iterative reconstruction, and/or weight based adjustment of the mA/kV was utilized to reduce the radiation dose to as low as reasonably achievable. COMPARISON: CT abdomen and pelvis yesterday. HISTORY: ORDERING SYSTEM PROVIDED HISTORY: chest pain rule out aaa ,pe TECHNOLOGIST PROVIDED HISTORY: chest pain rule out aaa ,pe Reason for Exam: chest pain rule out aaa ,pe Acuity: Unknown Type of Exam: Unknown Additional signs and symptoms: pt states chest/stomach pain Relevant Medical/Surgical History: no surgeries to area FINDINGS: Pulmonary Arteries: Pulmonary arteries are adequately opacified for evaluation. No evidence of intraluminal filling defect to suggest pulmonary embolism. Main pulmonary artery is normal in caliber. Mediastinum: No evidence of mediastinal lymphadenopathy. The heart and pericardium demonstrate no acute abnormality. There is no acute abnormality of the thoracic aorta. Lungs/pleura: No acute airspace disease identified. Mosaic attenuation of the lung parenchyma, likely due to regional air trapping. Sequela of old granulomatous disease. No effusion. The central airway is patent. Upper Abdomen: Cholelithiasis. Small hiatal hernia. Soft Tissues/Bones: No acute findings. Chronic T12 compression deformity. 1.  No evidence of pulmonary embolism or acute pulmonary abnormality. 2.  No thoracic aortic aneurysm or acute aortic abnormality appreciated. 3.  Cholelithiasis.              HPI:     Overnight no fever chills no nausea vomiting tolerating diet denies any chest pain chloride, potassium chloride **OR** potassium alternative oral replacement **OR** potassium chloride, magnesium sulfate, polyethylene glycol, acetaminophen **OR** acetaminophen, ondansetron, morphine **OR** morphine      Physical Examination:        BP (!) 150/62   Pulse 94   Temp 97.7 °F (36.5 °C) (Oral)   Resp 16   Ht 5' 6\" (1.676 m)   Wt 229 lb 11.5 oz (104.2 kg)   SpO2 96%   BMI 37.08 kg/m²   Temp (24hrs), Av.9 °F (36.6 °C), Min:97.7 °F (36.5 °C), Max:98.1 °F (36.7 °C)    No results for input(s): POCGLU in the last 72 hours. Intake/Output Summary (Last 24 hours) at 2021 1049  Last data filed at 2021 0644  Gross per 24 hour   Intake 240 ml   Output 1250 ml   Net -1010 ml       General Appearance:  alert, well appearing, and in no acute distress  Mental status:   Head:  normocephalic, atraumatic. Eye: no icterus, redness, pupils equal and reactive, extraocular eye movements intact, conjunctiva clear  Ear: normal external ear, no discharge, hearing intact  Nose:  no drainage noted  Mouth: mucous membranes moist  No teeth and mouth  Neck: supple, no carotid bruits, thyroid not palpable  Lungs: Bilateral equal air entry, clear to ausculation, no wheezing, rales or rhonchi, normal effort  Cardiovascular: normal rate, regular rhythm, no murmur, gallop, rub.   Abdomen: Soft, nontender, nondistended, normal bowel sounds, no hepatomegaly or splenomegaly  Neurologic: There are no new focal motor or sensory deficits,  Frequent tongues and lip smacking likely part of Parkinson's disease  Skin: No gross lesions, rashes, bruising or bleeding on exposed skin area  Extremities:  peripheral pulses palpable, no pedal edema or calf pain with palpation              Assessment:        Primary Problem  Hyponatremia    Active Hospital Problems    Diagnosis Date Noted    Other drug induced secondary parkinsonism ([facility] [address] [G21.19] 2021    SIADH (syndrome of inappropriate ADH production) ([facility] [address] [E22.2]     History of small bowel obstruction [Z87.19] 07/08/2021    Chronic abdominal pain [R10.9, G89.29] 07/08/2021    Hyponatremia [E87.1] 07/07/2021    Seizures ([address] [R56.9] 12/19/2011    History of gallstones [Z87.19] 12/19/2011    Peptic ulcer [K27.9] 12/19/2011    HTN (hypertension) [I10] 12/19/2011     Plan:        70-year-old morbidly obese lady class II BMI 28 lives with a sister admitted with abdominal pain chest pain CT scan showed porcelain gallbladder but nil acute  Routine labs revealed severe hyponatremia 118 likely secondary to decreased oral intake check for urine plasma osmolality urine sodium gentle hydration nephrology input requested  We will have the staff call the family patient lives with sister and has Parkinson's disease poor historian  Chest pain atypical in nature 4 months will order CTA rule out PE or dissection will review with results             July 11  Hyponatremia is improving  Abdominal chest pain resolved CTA chest no dissection no PE noted  Porcelain gallbladder high 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 Clinically doing ok   precert in progress   1. Hyponatremia - Patient is clinically euvolemic and hyponatremia in this patient is consistent with SIADH. This may be secondary to Oxcarbazepine[Trileptal]. Serum sodium is stable at 132 mmol/L. Plan: Continue fluid restriction 1500 mL/day. Check serum sodium level every 8 hours with target rate of correction of sodium not to exceed 0.5 mmol/L/h [maximum 8 mmol over 24 hours].     2. Systemic hypertension with sub optimal BP control. Hydralazine increased to 50 mg p.o. 3 times daily.     Prognosis is guarded as per Nephro note.          7/14/21    Is to discharge patient to skilled nursing facility  Stable and improved Serena Mcdermott MD  7/14/2021  10:49 AM

## 2021-07-14 NOTE — PROGRESS NOTES
Comprehensive Nutrition Assessment    Type and Reason for Visit:  Initial (LOS 7-day)    Nutrition Recommendations/Plan: Continue current diet    Nutrition Assessment:  Patient admitted with diagnosis of hyponatremia, serum sodium 118. Patient is a poor historian, has a history of patient Parkinson's disease, lives with sister. Serum sodium 131 improving, eating well, consuming % of meals. Continue current diet. Malnutrition Assessment:  Malnutrition Status: At risk for malnutrition (Comment)    Context:  Acute Illness     Findings of the 6 clinical characteristics of malnutrition:  Energy Intake:  1 - 75% or less of estimated energy requirements for 7 or more days  Weight Loss:  No significant weight loss     Body Fat Loss:  Unable to assess     Muscle Mass Loss:  Unable to assess    Fluid Accumulation:  1 - Mild Extremities, Generalized   Strength:  Not Performed    Estimated Daily Nutrient Needs:  Energy (kcal):  1726 kcal absed on [place] and 1.1 factor; Weight Used for Energy Requirements:  Current     Protein (g):  106-118 gm based on 1.8-2.0 gm/kg of ideal; Weight Used for Protein Requirements:  Ideal          Nutrition Related Findings:  Edema: +2 generalized, +1 BLE. Bowel sounds active      Wounds:  None       Current Nutrition Therapies:    ADULT DIET;  Regular; 1500 ml    Anthropometric Measures:  · Height: 5' 6\" (167.6 cm)  · Current Body Weight: 229 lb 11.5 oz (104.2 kg)   · Admission Body Weight: 218 lb (98.9 kg)    · Usual Body Weight: 210 lb (95.3 kg)     · Ideal Body Weight: 130 lbs; % Ideal Body Weight 176.7 %   · BMI: 37.1  · Adjusted Body Weight:  ; No Adjustment   · BMI Categories: Obese Class 2 (BMI 35.0 -39.9)       Nutrition Diagnosis:   · In context of acute illness or injury related to inadequate protein-energy intake, cognitive or neurological impairment as evidenced by poor intake prior to admission, lab values    Nutrition Interventions:   Food and/or Nutrient Delivery:  Continue Current Diet  Nutrition Education/Counseling:  No recommendation at this time   Coordination of Nutrition Care:  Continue to monitor while inpatient    Goals:  Meet greater than 75% of estimated nutrition needs       Nutrition Monitoring and Evaluation:   Behavioral-Environmental Outcomes:  None Identified   Food/Nutrient Intake Outcomes:  Food and Nutrient Intake  Physical Signs/Symptoms Outcomes:  Biochemical Data, Weight     Discharge Planning:    Continue current diet       Some areas of assessment may be incomplete due to COVID-19 precautions    Ming Flores RD, LD  Office phone [phone]

## 2021-07-15 NOTE — DISCHARGE SUMMARY
[facility] Internal Medicine    Discharge Summary     Patient ID: Alyce Tadeo  :  1950   MRN: [id number]     ACCOUNT:  [de-identified]   Patient's PCP: ANDRE Tuttle CNP  Admit Date: 2021   Discharge Date: 2021   Length of Stay: 7  Code Status:  Prior  Admitting Physician: Roger Alvarado MD  Discharge Physician: James El MD     Active Discharge Diagnoses:     Primary Problem  Hyponatremia      [place] Problems    Diagnosis Date Noted    Other drug induced secondary parkinsonism ([address] [G21.19] 2021    SIADH (syndrome of inappropriate ADH production) ([address] [E22.2]     History of small bowel obstruction [Z87.19] 2021    Chronic abdominal pain [R10.9, G89.29] 2021    Hyponatremia [E87.1] 2021    Seizures ([address] [R56.9] 2011    History of gallstones [Z87.19] 2011    Peptic ulcer [K27.9] 2011    HTN (hypertension) [I10] 2011       Admission Condition:  poor     Discharged Condition: fair    Hospital Stay:     Hospital Course:  Alyce Tadeo is a 65 y.o. female who was admitted for the management of   .     , presented with Abdominal Pain (x1 month)      ,                         Hyponatremia;                               Principal Problem:    Hyponatremia  Active Problems:    History of gallstones    Peptic ulcer    Seizures (HCC)    HTN (hypertension)    History of small bowel obstruction    Chronic abdominal pain    SIADH (syndrome of inappropriate ADH production) ([address]    Other drug induced secondary parkinsonism ([address]  Resolved Problems:    * No resolved hospital problems.  *       Significant therapeutic interventions:         68-year-old morbidly obese lady class II BMI 28 lives with a sister admitted with abdominal pain chest pain CT scan showed porcelain gallbladder but nil acute  Routine labs revealed severe hyponatremia 118 likely secondary to decreased oral intake check for urine plasma osmolality urine sodium gentle hydration nephrology input requested  We will have the staff call the family patient lives with sister and has Parkinson's disease poor historian  Chest pain atypical in nature 4 months will order CTA rule out PE or dissection will review with results           7/13/21   Pt c/o pain abdomen radiating to back. Getting pain medications. Denies any nausea, vomiting.     She remained afebrile, BP remained high overnight. Hydralazine increased to 50 mg from 25     Pt tolerating PO feed, good urine output. Didn't have BM, will add bowel regimen.     Labs show persistent Hyponatremia Na 128, nephro started salt tablets 1 gm PO 3 times daily. Recommended fluid restriction 1500 ml/day.      CT abdo pelvis 7/7/21:      Cholelithiasis with porcelain gallbladder.  No pericholecystic fluid or fat   stranding to suggest acute cholecystitis.       Interval partial sigmoidectomy.  Few diverticular main proximal to the   sigmoid anastomosis with no evidence of diverticulitis.          Discharge planning, awaiting precert. Appreciate Nephro input.      Pt discharged with all documented belonging. Discharge instructions given to patient ans sister (caretaker).  All questions answered at this time        Significant Diagnostic Studies:   Labs / Micro:       Results for orders placed or performed during the hospital encounter of 07/07/21   CBC Auto Differential   Result Value Ref Range    WBC 8.4 3.5 - 11.0 k/uL    RBC 4.25 4.0 - 5.2 m/uL    Hemoglobin 12.8 12.0 - 16.0 g/dL    Hematocrit 37.8 36 - 46 %    MCV 88.9 80 - 100 fL    MCH 30.2 26 - 34 pg    MCHC 34.0 31 - 37 g/dL    RDW 13.2 11.5 - 14.9 %    Platelets 596 974 - 172 k/uL    MPV 6.3 6.0 - 12.0 fL    NRBC Automated NOT REPORTED per 100 WBC    Differential Type NOT REPORTED     Seg Neutrophils 80 (H) 36 - 66 %    Lymphocytes 12 (L) 24 - 44 %    Monocytes 7 1 - 7 %    Eosinophils % 0 0 - 4 %    Basophils 1 0 - 2 % Immature Granulocytes NOT REPORTED 0 %    Segs Absolute 6.70 1.3 - 9.1 k/uL    Absolute Lymph # 1.00 1.0 - 4.8 k/uL    Absolute Mono # 0.60 0.1 - 1.3 k/uL    Absolute Eos # 0.00 0.0 - 0.4 k/uL    Basophils Absolute 0.10 0.0 - 0.2 k/uL    Absolute Immature Granulocyte NOT REPORTED 0.00 - 0.30 k/uL    WBC Morphology NOT REPORTED     RBC Morphology NOT REPORTED     Platelet Estimate NOT REPORTED    Comprehensive Metabolic Panel w/ Reflex to MG   Result Value Ref Range    Glucose 107 (H) 70 - 99 mg/dL    BUN 15 8 - 23 mg/dL    CREATININE 0.78 0.50 - 0.90 mg/dL    Bun/Cre Ratio NOT REPORTED 9 - 20    Calcium 9.0 8.6 - 10.4 mg/dL    Sodium 119 (LL) 135 - 144 mmol/L    Potassium 4.0 3.7 - 5.3 mmol/L    Chloride 86 (L) 98 - 107 mmol/L    CO2 23 20 - 31 mmol/L    Anion Gap 10 9 - 17 mmol/L    Alkaline Phosphatase 76 35 - 104 U/L    ALT 9 5 - 33 U/L    AST 15 <32 U/L    Total Bilirubin 0.41 0.3 - 1.2 mg/dL    Total Protein 7.3 6.4 - 8.3 g/dL    Albumin 4.4 3.5 - 5.2 g/dL    Albumin/Globulin Ratio NOT REPORTED 1.0 - 2.5    GFR Non-African American >60 >60 mL/min    GFR African American >60 >60 mL/min    GFR Comment          GFR Staging NOT REPORTED    Lipase   Result Value Ref Range    Lipase 20 13 - 60 U/L   URINALYSIS WITH MICROSCOPIC   Result Value Ref Range    Color, UA YELLOW YELLOW    Turbidity UA CLEAR CLEAR    Glucose, Ur NEGATIVE NEGATIVE    Bilirubin Urine NEGATIVE NEGATIVE    Ketones, Urine NEGATIVE NEGATIVE    Specific Gravity, UA 1.049 (H) 1.000 - 1.030    Urine Hgb NEGATIVE NEGATIVE    pH, UA 6.5 5.0 - 8.0    Protein, UA NEGATIVE NEGATIVE    Urobilinogen, Urine Normal Normal    Nitrite, Urine NEGATIVE NEGATIVE    Leukocyte Esterase, Urine NEGATIVE NEGATIVE    Urinalysis Comments NOT REPORTED     -          WBC, UA 0 TO 2 /HPF    RBC, UA 0 TO 2 /HPF    Casts UA NOT REPORTED /LPF    Crystals, UA NOT REPORTED None /HPF    Epithelial Cells UA 20 TO 50 /HPF    Renal Epithelial, UA NOT REPORTED 0 /HPF    Bacteria, UA FEW (A) None    Mucus, UA NOT REPORTED None    Trichomonas, UA NOT REPORTED None    Amorphous, UA NOT REPORTED None    Other Observations UA NOT REPORTED NOT REQ.     Yeast, UA NOT REPORTED None   Osmolality   Result Value Ref Range    Serum Osmolality 273 (L) 275 - 295 mOsm/kg   OSMOLALITY, URINE   Result Value Ref Range    Osmolality, Ur 494 80 - 1300 mOsm/kg   Basic Metabolic Panel w/ Reflex to MG   Result Value Ref Range    Glucose 97 70 - 99 mg/dL    BUN 12 8 - 23 mg/dL    CREATININE 0.64 0.50 - 0.90 mg/dL    Bun/Cre Ratio NOT REPORTED 9 - 20    Calcium 8.4 (L) 8.6 - 10.4 mg/dL    Sodium 122 (L) 135 - 144 mmol/L    Potassium 3.6 (L) 3.7 - 5.3 mmol/L    Chloride 91 (L) 98 - 107 mmol/L    CO2 21 20 - 31 mmol/L    Anion Gap 10 9 - 17 mmol/L    GFR Non-African American >60 >60 mL/min    GFR African American >60 >60 mL/min    GFR Comment          GFR Staging NOT REPORTED    CBC   Result Value Ref Range    WBC 5.7 3.5 - 11.0 k/uL    RBC 3.83 (L) 4.0 - 5.2 m/uL    Hemoglobin 11.7 (L) 12.0 - 16.0 g/dL    Hematocrit 34.9 (L) 36 - 46 %    MCV 91.1 80 - 100 fL    MCH 30.4 26 - 34 pg    MCHC 33.4 31 - 37 g/dL    RDW 13.2 11.5 - 14.9 %    Platelets 590 865 - 746 k/uL    MPV 6.4 6.0 - 12.0 fL    NRBC Automated NOT REPORTED per 100 WBC   Magnesium   Result Value Ref Range    Magnesium 2.1 1.6 - 2.6 mg/dL   NA (Sodium)   Result Value Ref Range    Sodium 118 (LL) 135 - 144 mmol/L   NA (Sodium)   Result Value Ref Range    Sodium 121 (L) 135 - 144 mmol/L   NA (Sodium)   Result Value Ref Range    Sodium 126 (L) 135 - 144 mmol/L   NA (Sodium)   Result Value Ref Range    Sodium 128 (L) 135 - 144 mmol/L   TROP/MYOGLOBIN   Result Value Ref Range    Troponin, High Sensitivity 23 (H) 0 - 14 ng/L    Troponin T NOT REPORTED <0.03 ng/mL    Troponin Interp NOT REPORTED     Myoglobin 60 (H) 25 - 58 ng/mL   TROP/MYOGLOBIN   Result Value Ref Range    Troponin, High Sensitivity 23 (H) 0 - 14 ng/L    Troponin T NOT REPORTED <0.03 ng/mL Troponin Interp NOT REPORTED     Myoglobin 72 (H) 25 - 58 ng/mL   OSMOLALITY, URINE   Result Value Ref Range    Osmolality, Ur 536 80 - 1300 mOsm/kg   URIC ACID   Result Value Ref Range    Uric Acid 5.0 2.4 - 5.7 mg/dL   TSH WITH REFLEX   Result Value Ref Range    TSH 1.02 0.30 - 5.00 mIU/L   CHLORIDE, URINE, RANDOM   Result Value Ref Range    Chloride, Ur 29 mmol/L   SODIUM, URINE, RANDOM   Result Value Ref Range    Sodium,Ur 38 mmol/L   Sodium   Result Value Ref Range    Sodium 127 (L) 135 - 144 mmol/L   NA (Sodium)   Result Value Ref Range    Sodium 128 (L) 135 - 144 mmol/L   Basic Metabolic Panel w/ Reflex to MG   Result Value Ref Range    Glucose 88 70 - 99 mg/dL    BUN 13 8 - 23 mg/dL    CREATININE 0.74 0.50 - 0.90 mg/dL    Bun/Cre Ratio NOT REPORTED 9 - 20    Calcium 8.2 (L) 8.6 - 10.4 mg/dL    Sodium 131 (L) 135 - 144 mmol/L    Potassium 4.1 3.7 - 5.3 mmol/L    Chloride 100 98 - 107 mmol/L    CO2 22 20 - 31 mmol/L    Anion Gap 9 9 - 17 mmol/L    GFR Non-African American >60 >60 mL/min    GFR African American >60 >60 mL/min    GFR Comment          GFR Staging NOT REPORTED    CBC   Result Value Ref Range    WBC 6.1 3.5 - 11.0 k/uL    RBC 3.75 (L) 4.0 - 5.2 m/uL    Hemoglobin 11.4 (L) 12.0 - 16.0 g/dL    Hematocrit 33.6 (L) 36 - 46 %    MCV 89.5 80 - 100 fL    MCH 30.4 26 - 34 pg    MCHC 33.9 31 - 37 g/dL    RDW 13.4 11.5 - 14.9 %    Platelets 025 560 - 060 k/uL    MPV 6.7 6.0 - 12.0 fL    NRBC Automated NOT REPORTED per 100 WBC   Magnesium   Result Value Ref Range    Magnesium 2.0 1.6 - 2.6 mg/dL   NA (Sodium)   Result Value Ref Range    Sodium 131 (L) 135 - 144 mmol/L   NA (Sodium)   Result Value Ref Range    Sodium 127 (L) 135 - 144 mmol/L   Basic Metabolic Panel w/ Reflex to MG   Result Value Ref Range    Glucose 100 (H) 70 - 99 mg/dL    BUN 10 8 - 23 mg/dL    CREATININE 0.70 0.50 - 0.90 mg/dL    Bun/Cre Ratio NOT REPORTED 9 - 20    Calcium 9.0 8.6 - 10.4 mg/dL    Sodium 131 (L) 135 - 144 mmol/L Potassium 4.2 3.7 - 5.3 mmol/L    Chloride 98 98 - 107 mmol/L    CO2 23 20 - 31 mmol/L    Anion Gap 10 9 - 17 mmol/L    GFR Non-African American >60 >60 mL/min    GFR African American >60 >60 mL/min    GFR Comment          GFR Staging NOT REPORTED    CBC   Result Value Ref Range    WBC 6.8 3.5 - 11.0 k/uL    RBC 4.34 4.0 - 5.2 m/uL    Hemoglobin 13.1 12.0 - 16.0 g/dL    Hematocrit 39.0 36 - 46 %    MCV 89.8 80 - 100 fL    MCH 30.1 26 - 34 pg    MCHC 33.5 31 - 37 g/dL    RDW 13.4 11.5 - 14.9 %    Platelets 352 616 - 927 k/uL    MPV 6.6 6.0 - 12.0 fL    NRBC Automated NOT REPORTED per 100 WBC   Magnesium   Result Value Ref Range    Magnesium 1.9 1.6 - 2.6 mg/dL   NA (Sodium)   Result Value Ref Range    Sodium 130 (L) 135 - 144 mmol/L   NA (Sodium)   Result Value Ref Range    Sodium 132 (L) 135 - 144 mmol/L   Basic Metabolic Panel w/ Reflex to MG   Result Value Ref Range    Glucose 96 70 - 99 mg/dL    BUN 14 8 - 23 mg/dL    CREATININE 0.76 0.50 - 0.90 mg/dL    Bun/Cre Ratio NOT REPORTED 9 - 20    Calcium 8.6 8.6 - 10.4 mg/dL    Sodium 132 (L) 135 - 144 mmol/L    Potassium 4.3 3.7 - 5.3 mmol/L    Chloride 99 98 - 107 mmol/L    CO2 24 20 - 31 mmol/L    Anion Gap 9 9 - 17 mmol/L    GFR Non-African American >60 >60 mL/min    GFR African American >60 >60 mL/min    GFR Comment          GFR Staging NOT REPORTED    CBC   Result Value Ref Range    WBC 5.4 3.5 - 11.0 k/uL    RBC 3.92 (L) 4.0 - 5.2 m/uL    Hemoglobin 12.0 12.0 - 16.0 g/dL    Hematocrit 36.0 36 - 46 %    MCV 91.6 80 - 100 fL    MCH 30.6 26 - 34 pg    MCHC 33.4 31 - 37 g/dL    RDW 13.4 11.5 - 14.9 %    Platelets 859 008 - 923 k/uL    MPV 7.2 6.0 - 12.0 fL    NRBC Automated NOT REPORTED per 100 WBC   Magnesium   Result Value Ref Range    Magnesium 2.0 1.6 - 2.6 mg/dL   NA (Sodium)   Result Value Ref Range    Sodium 130 (L) 135 - 144 mmol/L   NA (Sodium)   Result Value Ref Range    Sodium 130 (L) 135 - 144 mmol/L   Basic Metabolic Panel w/ Reflex to MG   Result Value Ref Range    Glucose 95 70 - 99 mg/dL    BUN 18 8 - 23 mg/dL    CREATININE 0.73 0.50 - 0.90 mg/dL    Bun/Cre Ratio NOT REPORTED 9 - 20    Calcium 8.7 8.6 - 10.4 mg/dL    Sodium 128 (L) 135 - 144 mmol/L    Potassium 4.3 3.7 - 5.3 mmol/L    Chloride 96 (L) 98 - 107 mmol/L    CO2 24 20 - 31 mmol/L    Anion Gap 8 (L) 9 - 17 mmol/L    GFR Non-African American >60 >60 mL/min    GFR African American >60 >60 mL/min    GFR Comment          GFR Staging NOT REPORTED    CBC   Result Value Ref Range    WBC 4.3 3.5 - 11.0 k/uL    RBC 3.90 (L) 4.0 - 5.2 m/uL    Hemoglobin 12.0 12.0 - 16.0 g/dL    Hematocrit 36.0 36 - 46 %    MCV 92.4 80 - 100 fL    MCH 30.9 26 - 34 pg    MCHC 33.5 31 - 37 g/dL    RDW 13.5 11.5 - 14.9 %    Platelets 427 176 - 655 k/uL    MPV 6.4 6.0 - 12.0 fL    NRBC Automated NOT REPORTED per 100 WBC   Magnesium   Result Value Ref Range    Magnesium 2.1 1.6 - 2.6 mg/dL   NA (Sodium)   Result Value Ref Range    Sodium 131 (L) 135 - 144 mmol/L   NA (Sodium)   Result Value Ref Range    Sodium 127 (L) 135 - 144 mmol/L   CBC   Result Value Ref Range    WBC 4.7 3.5 - 11.0 k/uL    RBC 4.10 4.0 - 5.2 m/uL    Hemoglobin 12.5 12.0 - 16.0 g/dL    Hematocrit 37.5 36 - 46 %    MCV 91.3 80 - 100 fL    MCH 30.4 26 - 34 pg    MCHC 33.3 31 - 37 g/dL    RDW 13.6 11.5 - 14.9 %    Platelets 264 240 - 027 k/uL    MPV 7.8 6.0 - 12.0 fL    NRBC Automated NOT REPORTED per 100 WBC   Basic Metabolic Panel w/ Reflex to MG   Result Value Ref Range    Glucose 96 70 - 99 mg/dL    BUN 22 8 - 23 mg/dL    CREATININE 0.79 0.50 - 0.90 mg/dL    Bun/Cre Ratio NOT REPORTED 9 - 20    Calcium 8.6 8.6 - 10.4 mg/dL    Sodium 128 (L) 135 - 144 mmol/L    Potassium 4.5 3.7 - 5.3 mmol/L    Chloride 96 (L) 98 - 107 mmol/L    CO2 23 20 - 31 mmol/L    Anion Gap 9 9 - 17 mmol/L    GFR Non-African American >60 >60 mL/min    GFR African American >60 >60 mL/min    GFR Comment          GFR Staging NOT REPORTED    Magnesium   Result Value Ref Range    Magnesium 2.0 SYSTEM PROVIDED HISTORY: Abdo pain, hx SBO TECHNOLOGIST PROVIDED HISTORY: Abdo pain, hx SBO Decision Support Exception - unselect if not a suspected or confirmed emergency medical condition->Emergency Medical Condition (MA) Reason for Exam: pt c/o abd pain x 1 month, pt unable to raise arms above head for CT exam. FINDINGS: Lower Chest: Heart size is normal.  Three-vessel calcific coronary artery disease. Small hiatal hernia. Lung bases are clear. Organs: Cholelithiasis with porcelain gallbladder. No pericholecystic fluid fat stranding. No change from prior study. The liver, spleen, pancreas and kidneys are normal.  Small nodular contour of the bilateral adrenal glands, unchanged. GI/Bowel: Interval partial sigmoidectomy. A few diverticula remain proximal to the sigmoid anastomosis. No evidence of diverticulitis. The appendix is normal.  No bowel obstruction. Pelvis: Urinary bladder is unremarkable. The uterus is surgically absent. Peritoneum/Retroperitoneum: There is no adenopathy, free air or free fluid. There is a retroaortic left renal vein. Calcific aorto iliac atherosclerotic disease with no evidence of an aneurysm. Bones/Soft Tissues: Unchanged/remote compression fractures of T12 and L5. No acute bone finding. No acute finding in the abdomen or pelvis. Cholelithiasis with porcelain gallbladder. No pericholecystic fluid or fat stranding to suggest acute cholecystitis. Interval partial sigmoidectomy. Few diverticular main proximal to the sigmoid anastomosis with no evidence of diverticulitis. Unchanged/remote T12 and L5 compression fractures. CT CHEST PULMONARY EMBOLISM W CONTRAST    Result Date: 7/8/2021  EXAMINATION: CTA OF THE CHEST 7/8/2021 11:13 am TECHNIQUE: CTA of the chest was performed after the administration of intravenous contrast.  Multiplanar reformatted images are provided for review. MIP images are provided for review.  Dose modulation, iterative reconstruction, and/or weight based adjustment of the mA/kV was utilized to reduce the radiation dose to as low as reasonably achievable. COMPARISON: CT abdomen and pelvis yesterday. HISTORY: ORDERING SYSTEM PROVIDED HISTORY: chest pain rule out aaa ,pe TECHNOLOGIST PROVIDED HISTORY: chest pain rule out aaa ,pe Reason for Exam: chest pain rule out aaa ,pe Acuity: Unknown Type of Exam: Unknown Additional signs and symptoms: pt states chest/stomach pain Relevant Medical/Surgical History: no surgeries to area FINDINGS: Pulmonary Arteries: Pulmonary arteries are adequately opacified for evaluation. No evidence of intraluminal filling defect to suggest pulmonary embolism. Main pulmonary artery is normal in caliber. Mediastinum: No evidence of mediastinal lymphadenopathy. The heart and pericardium demonstrate no acute abnormality. There is no acute abnormality of the thoracic aorta. Lungs/pleura: No acute airspace disease identified. Mosaic attenuation of the lung parenchyma, likely due to regional air trapping. Sequela of old granulomatous disease. No effusion. The central airway is patent. Upper Abdomen: Cholelithiasis. Small hiatal hernia. Soft Tissues/Bones: No acute findings. Chronic T12 compression deformity. 1.  No evidence of pulmonary embolism or acute pulmonary abnormality. 2.  No thoracic aortic aneurysm or acute aortic abnormality appreciated. 3.  Cholelithiasis. Consultations:    Consults:     Final Specialist Recommendations/Findings:   IP CONSULT TO INTERNAL MEDICINE  IP CONSULT TO INTERNAL MEDICINE  IP CONSULT TO NEPHROLOGY  IP CONSULT TO SOCIAL WORK      The patient was seen and examined on day of discharge and this discharge summary is in conjunction with any daily progress note from day of discharge. Discharge plan:     Disposition: Home    Physician Follow Up:   With PCP and as specified     Requiring Further Evaluation/Follow Up POST HOSPITALIZATION/Incidental Findings:    Diet: regular     Activity: As tolerated    I  Discharge Medications:      Medication List      START taking these medications    hydrALAZINE 50 MG tablet  Commonly known as: APRESOLINE  Take 1 tablet by mouth every 8 hours     LORazepam 0.5 MG tablet  Commonly known as: ATIVAN  Take 1 tablet by mouth every 6 hours as needed for Anxiety for up to 30 days. polyethylene glycol 17 g packet  Commonly known as: GLYCOLAX  Take 17 g by mouth daily as needed for Constipation     sodium chloride 1 g tablet  Take 1 tablet by mouth 3 times daily (with meals)        CONTINUE taking these medications    OXcarbazepine 300 MG tablet  Commonly known as: TRILEPTAL           Where to Get Your Medications      These medications were sent to [address] [place] [address] [facility] [address]    Phone: [phone]   · hydrALAZINE 50 MG tablet  · polyethylene glycol 17 g packet  · sodium chloride 1 g tablet     You can get these medications from any pharmacy    Bring a paper prescription for each of these medications  · LORazepam 0.5 MG tablet           Time spent on discharge planning ;          [] less than 30 minutes . [x]   more  than 30 minutes . Ellectronically signed by   Rajat Bautista MD      Thank you Dr. Edilma Salamanca, APRN - CNP for the opportunity to be involved in this patient's care. Please note that this chart was generated using voice recognition Dragon dictation software. Although every effort was made to ensure the accuracy of this automated transcription, some errors in transcription may have occurred.

## 2022-01-01 ENCOUNTER — HOSPITAL ENCOUNTER (OUTPATIENT)
Age: 72
Setting detail: SPECIMEN
Discharge: HOME | End: 2022-03-29
Payer: COMMERCIAL

## 2022-01-01 ENCOUNTER — APPOINTMENT (OUTPATIENT)
Dept: CT IMAGING | Age: 72
DRG: 100 | End: 2022-01-01
Payer: COMMERCIAL

## 2022-01-01 ENCOUNTER — HOSPITAL ENCOUNTER (OUTPATIENT)
Age: 72
Setting detail: SPECIMEN
Discharge: HOME | End: 2022-02-21
Payer: COMMERCIAL

## 2022-01-01 ENCOUNTER — APPOINTMENT (OUTPATIENT)
Dept: GENERAL RADIOLOGY | Age: 72
DRG: 100 | End: 2022-01-01
Payer: COMMERCIAL

## 2022-01-01 ENCOUNTER — HOSPITAL ENCOUNTER (INPATIENT)
Age: 72
LOS: 1 days | DRG: 100 | End: 2022-09-23
Attending: EMERGENCY MEDICINE | Admitting: FAMILY MEDICINE
Payer: COMMERCIAL

## 2022-01-01 ENCOUNTER — HOSPITAL ENCOUNTER (OUTPATIENT)
Age: 72
Setting detail: SPECIMEN
Discharge: HOME | End: 2022-02-20
Payer: COMMERCIAL

## 2022-01-01 ENCOUNTER — HOSPITAL ENCOUNTER (OUTPATIENT)
Age: 72
Setting detail: SPECIMEN
Discharge: HOME | End: 2022-01-03

## 2022-01-01 VITALS
SYSTOLIC BLOOD PRESSURE: 90 MMHG | HEIGHT: 69 IN | DIASTOLIC BLOOD PRESSURE: 50 MMHG | RESPIRATION RATE: 28 BRPM | WEIGHT: 200 LBS | BODY MASS INDEX: 29.62 KG/M2 | OXYGEN SATURATION: 93 % | TEMPERATURE: 97.8 F | HEART RATE: 98 BPM

## 2022-01-01 DIAGNOSIS — I26.99 OTHER ACUTE PULMONARY EMBOLISM WITHOUT ACUTE COR PULMONALE (HCC): Primary | ICD-10-CM

## 2022-01-01 DIAGNOSIS — R56.9 SEIZURE (HCC): ICD-10-CM

## 2022-01-01 DIAGNOSIS — N30.00 ACUTE CYSTITIS WITHOUT HEMATURIA: ICD-10-CM

## 2022-01-01 LAB
-: ABNORMAL
ABSOLUTE EOS #: 0.1 K/UL (ref 0–0.4)
ABSOLUTE EOS #: 0.24 K/UL (ref 0–0.44)
ABSOLUTE IMMATURE GRANULOCYTE: <0.03 K/UL (ref 0–0.3)
ABSOLUTE LYMPH #: 1.63 K/UL (ref 1.1–3.7)
ABSOLUTE LYMPH #: 2.6 K/UL (ref 1–4.8)
ABSOLUTE MONO #: 0.35 K/UL (ref 0.1–1.2)
ABSOLUTE MONO #: 0.5 K/UL (ref 0.1–1.3)
ALBUMIN SERPL-MCNC: 3 G/DL (ref 3.5–5.2)
ALBUMIN SERPL-MCNC: 4.1 G/DL (ref 3.5–5.2)
ALBUMIN/GLOBULIN RATIO: 1.6 (ref 1–2.5)
ALP BLD-CCNC: 53 U/L (ref 35–104)
ALP BLD-CCNC: 62 U/L (ref 35–104)
ALT SERPL-CCNC: 30 U/L (ref 5–33)
ALT SERPL-CCNC: 8 U/L (ref 5–33)
AMORPHOUS: ABNORMAL
AMORPHOUS: ABNORMAL
ANION GAP SERPL CALCULATED.3IONS-SCNC: 10 MMOL/L (ref 9–17)
ANION GAP SERPL CALCULATED.3IONS-SCNC: 12 MMOL/L (ref 9–17)
ANION GAP SERPL CALCULATED.3IONS-SCNC: 14 MMOL/L (ref 9–17)
ANION GAP SERPL CALCULATED.3IONS-SCNC: 23 MMOL/L (ref 9–17)
ANTI-XA UNFRAC HEPARIN: >1.1 IU/L (ref 0.3–0.7)
AST SERPL-CCNC: 14 U/L
AST SERPL-CCNC: 41 U/L
BACTERIA: ABNORMAL
BASOPHILS # BLD: 1 % (ref 0–2)
BASOPHILS # BLD: 1 % (ref 0–2)
BASOPHILS ABSOLUTE: 0.03 K/UL (ref 0–0.2)
BASOPHILS ABSOLUTE: 0.1 K/UL (ref 0–0.2)
BILIRUB SERPL-MCNC: 0.28 MG/DL (ref 0.3–1.2)
BILIRUB SERPL-MCNC: 0.6 MG/DL (ref 0.3–1.2)
BILIRUBIN DIRECT: 0.3 MG/DL
BILIRUBIN URINE: NEGATIVE
BILIRUBIN URINE: NEGATIVE
BILIRUBIN, INDIRECT: 0.3 MG/DL (ref 0–1)
BUN BLDV-MCNC: 18 MG/DL (ref 8–23)
BUN BLDV-MCNC: 19 MG/DL (ref 8–23)
BUN BLDV-MCNC: 20 MG/DL (ref 8–23)
BUN BLDV-MCNC: 20 MG/DL (ref 8–23)
BUN/CREAT BLD: ABNORMAL (ref 9–20)
BUN/CREAT BLD: NORMAL (ref 9–20)
CALCIUM SERPL-MCNC: 8.9 MG/DL (ref 8.6–10.4)
CALCIUM SERPL-MCNC: 9 MG/DL (ref 8.6–10.4)
CALCIUM SERPL-MCNC: 9.2 MG/DL (ref 8.6–10.4)
CALCIUM SERPL-MCNC: 9.2 MG/DL (ref 8.6–10.4)
CARBOXYHEMOGLOBIN: 2.4 % (ref 0–5)
CASTS UA: ABNORMAL /LPF (ref 0–2)
CASTS UA: ABNORMAL /LPF (ref 0–2)
CHLORIDE BLD-SCNC: 100 MMOL/L (ref 98–107)
CHLORIDE BLD-SCNC: 103 MMOL/L (ref 98–107)
CHLORIDE BLD-SCNC: 106 MMOL/L (ref 98–107)
CHLORIDE BLD-SCNC: 94 MMOL/L (ref 98–107)
CO2: 17 MMOL/L (ref 20–31)
CO2: 20 MMOL/L (ref 20–31)
CO2: 23 MMOL/L (ref 20–31)
CO2: 25 MMOL/L (ref 20–31)
COLOR: ABNORMAL
COLOR: YELLOW
COMMENT UA: ABNORMAL
CREAT SERPL-MCNC: 0.56 MG/DL (ref 0.5–0.9)
CREAT SERPL-MCNC: 0.58 MG/DL (ref 0.5–0.9)
CREAT SERPL-MCNC: 0.75 MG/DL (ref 0.5–0.9)
CREAT SERPL-MCNC: 0.86 MG/DL (ref 0.5–0.9)
CRYSTALS, UA: ABNORMAL /HPF
CULTURE: NORMAL
CULTURE: NORMAL
EKG ATRIAL RATE: 118 BPM
EKG P AXIS: 99 DEGREES
EKG P-R INTERVAL: 180 MS
EKG Q-T INTERVAL: 316 MS
EKG QRS DURATION: 86 MS
EKG QTC CALCULATION (BAZETT): 442 MS
EKG R AXIS: 83 DEGREES
EKG T AXIS: -63 DEGREES
EKG VENTRICULAR RATE: 118 BPM
EOSINOPHILS RELATIVE PERCENT: 1 % (ref 0–4)
EOSINOPHILS RELATIVE PERCENT: 5 % (ref 1–4)
EPITHELIAL CELLS UA: ABNORMAL /HPF
EPITHELIAL CELLS UA: ABNORMAL /HPF (ref 0–5)
FIO2: 100
GFR AFRICAN AMERICAN: >60 ML/MIN
GFR NON-AFRICAN AMERICAN: >60 ML/MIN
GFR SERPL CREATININE-BSD FRML MDRD: ABNORMAL ML/MIN/{1.73_M2}
GFR SERPL CREATININE-BSD FRML MDRD: NORMAL ML/MIN/{1.73_M2}
GFR SERPL CREATININE-BSD FRML MDRD: NORMAL ML/MIN/{1.73_M2}
GLUCOSE BLD-MCNC: 112 MG/DL (ref 70–99)
GLUCOSE BLD-MCNC: 70 MG/DL (ref 70–99)
GLUCOSE BLD-MCNC: 81 MG/DL (ref 70–99)
GLUCOSE BLD-MCNC: 83 MG/DL (ref 70–99)
GLUCOSE URINE: NEGATIVE
GLUCOSE URINE: NEGATIVE
HCO3 VENOUS: 16.9 MMOL/L (ref 24–30)
HCT VFR BLD CALC: 37.9 % (ref 36–46)
HCT VFR BLD CALC: 39.5 % (ref 36.3–47.1)
HCT VFR BLD CALC: 40.3 % (ref 36.3–47.1)
HCT VFR BLD CALC: 42.3 % (ref 36.3–47.1)
HEMOGLOBIN: 12.7 G/DL (ref 11.9–15.1)
HEMOGLOBIN: 12.9 G/DL (ref 11.9–15.1)
HEMOGLOBIN: 13.1 G/DL (ref 12–16)
HEMOGLOBIN: 13.5 G/DL (ref 11.9–15.1)
IMMATURE GRANULOCYTES: 0 %
INR BLD: 1.1
KETONES, URINE: ABNORMAL
KETONES, URINE: ABNORMAL
LEUKOCYTE ESTERASE, URINE: ABNORMAL
LEUKOCYTE ESTERASE, URINE: NEGATIVE
LIPASE: 23 U/L (ref 13–60)
LYMPHOCYTES # BLD: 20 % (ref 24–44)
LYMPHOCYTES # BLD: 35 % (ref 24–43)
Lab: NORMAL
MAGNESIUM: 2.3 MG/DL (ref 1.6–2.6)
MCH RBC QN AUTO: 29.6 PG (ref 25.2–33.5)
MCH RBC QN AUTO: 29.9 PG (ref 25.2–33.5)
MCH RBC QN AUTO: 30.6 PG (ref 25.2–33.5)
MCH RBC QN AUTO: 32.3 PG (ref 26–34)
MCHC RBC AUTO-ENTMCNC: 31.9 G/DL (ref 28.4–34.8)
MCHC RBC AUTO-ENTMCNC: 32 G/DL (ref 28.4–34.8)
MCHC RBC AUTO-ENTMCNC: 32.2 G/DL (ref 28.4–34.8)
MCHC RBC AUTO-ENTMCNC: 34.5 G/DL (ref 31–37)
MCV RBC AUTO: 92.8 FL (ref 82.6–102.9)
MCV RBC AUTO: 92.9 FL (ref 82.6–102.9)
MCV RBC AUTO: 93.7 FL (ref 80–100)
MCV RBC AUTO: 95.7 FL (ref 82.6–102.9)
METHEMOGLOBIN: 0.8 % (ref 0–1.9)
MODE: ABNORMAL
MONOCYTES # BLD: 4 % (ref 1–7)
MONOCYTES # BLD: 8 % (ref 3–12)
MUCUS: ABNORMAL
NEGATIVE BASE EXCESS, VEN: 10.6 MMOL/L (ref 0–2)
NITRITE, URINE: NEGATIVE
NITRITE, URINE: NEGATIVE
NRBC AUTOMATED: 0 PER 100 WBC
O2 DEVICE/FLOW/%: ABNORMAL
O2 SAT, VEN: 63.6 % (ref 60–85)
OTHER OBSERVATIONS UA: ABNORMAL
PARTIAL THROMBOPLASTIN TIME: 27.3 SEC (ref 24–36)
PATIENT TEMP: 37
PCO2, VEN: 40.2 MM HG (ref 39–55)
PDW BLD-RTO: 12.7 % (ref 11.8–14.4)
PDW BLD-RTO: 13.2 % (ref 11.8–14.4)
PDW BLD-RTO: 13.3 % (ref 11.8–14.4)
PDW BLD-RTO: 17 % (ref 11.5–14.9)
PH UA: 6.5 (ref 5–8)
PH UA: 6.5 (ref 5–8)
PH VENOUS: 7.23 (ref 7.32–7.42)
PHOSPHORUS: 3.8 MG/DL (ref 2.6–4.5)
PLATELET # BLD: 208 K/UL (ref 138–453)
PLATELET # BLD: 228 K/UL (ref 138–453)
PLATELET # BLD: 239 K/UL (ref 138–453)
PLATELET # BLD: 241 K/UL (ref 150–450)
PMV BLD AUTO: 8 FL (ref 6–12)
PMV BLD AUTO: 9.5 FL (ref 8.1–13.5)
PMV BLD AUTO: 9.7 FL (ref 8.1–13.5)
PMV BLD AUTO: 9.8 FL (ref 8.1–13.5)
PO2, VEN: 39.2 MM HG (ref 30–50)
POTASSIUM SERPL-SCNC: 3.5 MMOL/L (ref 3.7–5.3)
POTASSIUM SERPL-SCNC: 4.1 MMOL/L (ref 3.7–5.3)
POTASSIUM SERPL-SCNC: 4.3 MMOL/L (ref 3.7–5.3)
POTASSIUM SERPL-SCNC: 4.5 MMOL/L (ref 3.7–5.3)
PRO-BNP: 852 PG/ML
PROTEIN UA: ABNORMAL
PROTEIN UA: NEGATIVE
PROTHROMBIN TIME: 14 SEC (ref 11.8–14.6)
PT. POSITION: ABNORMAL
RBC # BLD: 4.05 M/UL (ref 4–5.2)
RBC # BLD: 4.21 M/UL (ref 3.95–5.11)
RBC # BLD: 4.25 M/UL (ref 3.95–5.11)
RBC # BLD: 4.56 M/UL (ref 3.95–5.11)
RBC UA: ABNORMAL /HPF
RBC UA: ABNORMAL /HPF (ref 0–2)
RENAL EPITHELIAL, UA: ABNORMAL /HPF
SEG NEUTROPHILS: 51 % (ref 36–65)
SEG NEUTROPHILS: 74 % (ref 36–66)
SEGMENTED NEUTROPHILS ABSOLUTE COUNT: 10.2 K/UL (ref 1.3–9.1)
SEGMENTED NEUTROPHILS ABSOLUTE COUNT: 2.36 K/UL (ref 1.5–8.1)
SODIUM BLD-SCNC: 132 MMOL/L (ref 135–144)
SODIUM BLD-SCNC: 134 MMOL/L (ref 135–144)
SODIUM BLD-SCNC: 138 MMOL/L (ref 135–144)
SODIUM BLD-SCNC: 143 MMOL/L (ref 135–144)
SPECIFIC GRAVITY UA: 1.02 (ref 1–1.03)
SPECIFIC GRAVITY UA: 1.03 (ref 1–1.03)
SPECIMEN DESCRIPTION: NORMAL
SPECIMEN DESCRIPTION: NORMAL
TEXT FOR RESPIRATORY: ABNORMAL
TOTAL PROTEIN: 6.2 G/DL (ref 6.4–8.3)
TOTAL PROTEIN: 6.6 G/DL (ref 6.4–8.3)
TRICHOMONAS: ABNORMAL
TROPONIN, HIGH SENSITIVITY: 36 NG/L (ref 0–14)
TROPONIN, HIGH SENSITIVITY: 45 NG/L (ref 0–14)
TURBIDITY: ABNORMAL
TURBIDITY: CLEAR
URINE HGB: NEGATIVE
URINE HGB: NEGATIVE
UROBILINOGEN, URINE: NORMAL
UROBILINOGEN, URINE: NORMAL
VALPROIC ACID LEVEL: 58 UG/ML (ref 50–125)
WBC # BLD: 13.5 K/UL (ref 3.5–11)
WBC # BLD: 4.6 K/UL (ref 3.5–11.3)
WBC # BLD: 4.9 K/UL (ref 3.5–11.3)
WBC # BLD: 6.3 K/UL (ref 3.5–11.3)
WBC UA: ABNORMAL /HPF
WBC UA: ABNORMAL /HPF (ref 0–5)
YEAST: ABNORMAL

## 2022-01-01 PROCEDURE — 2580000003 HC RX 258: Performed by: FAMILY MEDICINE

## 2022-01-01 PROCEDURE — 80048 BASIC METABOLIC PNL TOTAL CA: CPT

## 2022-01-01 PROCEDURE — 85027 COMPLETE CBC AUTOMATED: CPT

## 2022-01-01 PROCEDURE — 74177 CT ABD & PELVIS W/CONTRAST: CPT

## 2022-01-01 PROCEDURE — 71045 X-RAY EXAM CHEST 1 VIEW: CPT

## 2022-01-01 PROCEDURE — P9612 CATHETERIZE FOR URINE SPEC: HCPCS

## 2022-01-01 PROCEDURE — P9603 ONE-WAY ALLOW PRORATED MILES: HCPCS

## 2022-01-01 PROCEDURE — 6360000002 HC RX W HCPCS: Performed by: EMERGENCY MEDICINE

## 2022-01-01 PROCEDURE — 71260 CT THORAX DX C+: CPT | Performed by: EMERGENCY MEDICINE

## 2022-01-01 PROCEDURE — 6360000004 HC RX CONTRAST MEDICATION: Performed by: EMERGENCY MEDICINE

## 2022-01-01 PROCEDURE — 36415 COLL VENOUS BLD VENIPUNCTURE: CPT

## 2022-01-01 PROCEDURE — 80164 ASSAY DIPROPYLACETIC ACD TOT: CPT

## 2022-01-01 PROCEDURE — 85610 PROTHROMBIN TIME: CPT

## 2022-01-01 PROCEDURE — 70450 CT HEAD/BRAIN W/O DYE: CPT

## 2022-01-01 PROCEDURE — 94660 CPAP INITIATION&MGMT: CPT

## 2022-01-01 PROCEDURE — 80183 DRUG SCRN QUANT OXCARBAZEPIN: CPT

## 2022-01-01 PROCEDURE — 85025 COMPLETE CBC W/AUTO DIFF WBC: CPT

## 2022-01-01 PROCEDURE — 94761 N-INVAS EAR/PLS OXIMETRY MLT: CPT

## 2022-01-01 PROCEDURE — 83690 ASSAY OF LIPASE: CPT

## 2022-01-01 PROCEDURE — 6360000002 HC RX W HCPCS: Performed by: FAMILY MEDICINE

## 2022-01-01 PROCEDURE — 96360 HYDRATION IV INFUSION INIT: CPT

## 2022-01-01 PROCEDURE — 96361 HYDRATE IV INFUSION ADD-ON: CPT

## 2022-01-01 PROCEDURE — 87086 URINE CULTURE/COLONY COUNT: CPT

## 2022-01-01 PROCEDURE — 93010 ELECTROCARDIOGRAM REPORT: CPT | Performed by: INTERNAL MEDICINE

## 2022-01-01 PROCEDURE — 2580000003 HC RX 258: Performed by: EMERGENCY MEDICINE

## 2022-01-01 PROCEDURE — 85520 HEPARIN ASSAY: CPT

## 2022-01-01 PROCEDURE — 80053 COMPREHEN METABOLIC PANEL: CPT

## 2022-01-01 PROCEDURE — 82805 BLOOD GASES W/O2 SATURATION: CPT

## 2022-01-01 PROCEDURE — 1200000000 HC SEMI PRIVATE

## 2022-01-01 PROCEDURE — 85730 THROMBOPLASTIN TIME PARTIAL: CPT

## 2022-01-01 PROCEDURE — 2700000000 HC OXYGEN THERAPY PER DAY

## 2022-01-01 PROCEDURE — 93005 ELECTROCARDIOGRAM TRACING: CPT | Performed by: EMERGENCY MEDICINE

## 2022-01-01 PROCEDURE — 80076 HEPATIC FUNCTION PANEL: CPT

## 2022-01-01 PROCEDURE — 84484 ASSAY OF TROPONIN QUANT: CPT

## 2022-01-01 PROCEDURE — 83735 ASSAY OF MAGNESIUM: CPT

## 2022-01-01 PROCEDURE — 81001 URINALYSIS AUTO W/SCOPE: CPT

## 2022-01-01 PROCEDURE — 84100 ASSAY OF PHOSPHORUS: CPT

## 2022-01-01 PROCEDURE — 82800 BLOOD PH: CPT

## 2022-01-01 PROCEDURE — 83880 ASSAY OF NATRIURETIC PEPTIDE: CPT

## 2022-01-01 PROCEDURE — 99222 1ST HOSP IP/OBS MODERATE 55: CPT | Performed by: NURSE PRACTITIONER

## 2022-01-01 PROCEDURE — 99285 EMERGENCY DEPT VISIT HI MDM: CPT

## 2022-01-01 PROCEDURE — 6370000000 HC RX 637 (ALT 250 FOR IP): Performed by: FAMILY MEDICINE

## 2022-01-01 RX ORDER — HYDROCODONE BITARTRATE AND ACETAMINOPHEN 5; 325 MG/1; MG/1
1 TABLET ORAL EVERY 4 HOURS PRN
COMMUNITY

## 2022-01-01 RX ORDER — MIRTAZAPINE 15 MG/1
7.5 TABLET, FILM COATED ORAL NIGHTLY
COMMUNITY

## 2022-01-01 RX ORDER — MORPHINE SULFATE 2 MG/ML
2 INJECTION, SOLUTION INTRAMUSCULAR; INTRAVENOUS
Status: DISCONTINUED | OUTPATIENT
Start: 2022-01-01 | End: 2022-01-01 | Stop reason: HOSPADM

## 2022-01-01 RX ORDER — ACETAMINOPHEN 325 MG/1
650 TABLET ORAL EVERY 6 HOURS PRN
COMMUNITY

## 2022-01-01 RX ORDER — DIVALPROEX SODIUM 125 MG/1
125 TABLET, DELAYED RELEASE ORAL 3 TIMES DAILY
COMMUNITY

## 2022-01-01 RX ORDER — DIAZEPAM 5 MG/ML
2.5 INJECTION, SOLUTION INTRAMUSCULAR; INTRAVENOUS EVERY 4 HOURS PRN
Status: DISCONTINUED | OUTPATIENT
Start: 2022-01-01 | End: 2022-01-01

## 2022-01-01 RX ORDER — SODIUM CHLORIDE 0.9 % (FLUSH) 0.9 %
5-40 SYRINGE (ML) INJECTION PRN
Status: DISCONTINUED | OUTPATIENT
Start: 2022-01-01 | End: 2022-01-01 | Stop reason: HOSPADM

## 2022-01-01 RX ORDER — 0.9 % SODIUM CHLORIDE 0.9 %
500 INTRAVENOUS SOLUTION INTRAVENOUS ONCE
Status: COMPLETED | OUTPATIENT
Start: 2022-01-01 | End: 2022-01-01

## 2022-01-01 RX ORDER — CHOLECALCIFEROL (VITAMIN D3) 125 MCG
5 CAPSULE ORAL NIGHTLY
COMMUNITY

## 2022-01-01 RX ORDER — LORAZEPAM 2 MG/ML
1 INJECTION INTRAMUSCULAR EVERY 6 HOURS PRN
Status: DISCONTINUED | OUTPATIENT
Start: 2022-01-01 | End: 2022-01-01

## 2022-01-01 RX ORDER — GLYCOPYRROLATE 0.2 MG/ML
0.2 INJECTION INTRAMUSCULAR; INTRAVENOUS EVERY 4 HOURS PRN
Status: DISCONTINUED | OUTPATIENT
Start: 2022-01-01 | End: 2022-01-01 | Stop reason: HOSPADM

## 2022-01-01 RX ORDER — SODIUM CHLORIDE 9 MG/ML
INJECTION, SOLUTION INTRAVENOUS PRN
Status: DISCONTINUED | OUTPATIENT
Start: 2022-01-01 | End: 2022-01-01 | Stop reason: HOSPADM

## 2022-01-01 RX ORDER — 0.9 % SODIUM CHLORIDE 0.9 %
80 INTRAVENOUS SOLUTION INTRAVENOUS ONCE
Status: COMPLETED | OUTPATIENT
Start: 2022-01-01 | End: 2022-01-01

## 2022-01-01 RX ORDER — DIVALPROEX SODIUM 125 MG/1
125 CAPSULE, COATED PELLETS ORAL 3 TIMES DAILY
Status: DISCONTINUED | OUTPATIENT
Start: 2022-01-01 | End: 2022-01-01 | Stop reason: HOSPADM

## 2022-01-01 RX ORDER — OXCARBAZEPINE 300 MG/1
300 TABLET, FILM COATED ORAL 2 TIMES DAILY
Status: DISCONTINUED | OUTPATIENT
Start: 2022-01-01 | End: 2022-01-01 | Stop reason: HOSPADM

## 2022-01-01 RX ORDER — ONDANSETRON 4 MG/1
4 TABLET, ORALLY DISINTEGRATING ORAL EVERY 8 HOURS PRN
Status: DISCONTINUED | OUTPATIENT
Start: 2022-01-01 | End: 2022-01-01 | Stop reason: HOSPADM

## 2022-01-01 RX ORDER — HEPARIN SODIUM 1000 [USP'U]/ML
80 INJECTION, SOLUTION INTRAVENOUS; SUBCUTANEOUS PRN
Status: DISCONTINUED | OUTPATIENT
Start: 2022-01-01 | End: 2022-01-01 | Stop reason: HOSPADM

## 2022-01-01 RX ORDER — HEPARIN SODIUM 1000 [USP'U]/ML
80 INJECTION, SOLUTION INTRAVENOUS; SUBCUTANEOUS ONCE
Status: COMPLETED | OUTPATIENT
Start: 2022-01-01 | End: 2022-01-01

## 2022-01-01 RX ORDER — DONEPEZIL HYDROCHLORIDE 10 MG/1
10 TABLET, FILM COATED ORAL NIGHTLY
COMMUNITY

## 2022-01-01 RX ORDER — ACETAMINOPHEN 325 MG/1
650 TABLET ORAL EVERY 4 HOURS PRN
Status: DISCONTINUED | OUTPATIENT
Start: 2022-01-01 | End: 2022-01-01 | Stop reason: HOSPADM

## 2022-01-01 RX ORDER — SODIUM CHLORIDE 0.9 % (FLUSH) 0.9 %
5-40 SYRINGE (ML) INJECTION EVERY 12 HOURS SCHEDULED
Status: DISCONTINUED | OUTPATIENT
Start: 2022-01-01 | End: 2022-01-01 | Stop reason: HOSPADM

## 2022-01-01 RX ORDER — ONDANSETRON 2 MG/ML
4 INJECTION INTRAMUSCULAR; INTRAVENOUS EVERY 6 HOURS PRN
Status: DISCONTINUED | OUTPATIENT
Start: 2022-01-01 | End: 2022-01-01 | Stop reason: HOSPADM

## 2022-01-01 RX ORDER — LORAZEPAM 2 MG/ML
1 INJECTION INTRAMUSCULAR
Status: DISCONTINUED | OUTPATIENT
Start: 2022-01-01 | End: 2022-01-01 | Stop reason: HOSPADM

## 2022-01-01 RX ORDER — HEPARIN SODIUM 10000 [USP'U]/100ML
5-30 INJECTION, SOLUTION INTRAVENOUS CONTINUOUS
Status: DISCONTINUED | OUTPATIENT
Start: 2022-01-01 | End: 2022-01-01 | Stop reason: HOSPADM

## 2022-01-01 RX ORDER — ALBUTEROL SULFATE 90 UG/1
2 AEROSOL, METERED RESPIRATORY (INHALATION) EVERY 8 HOURS PRN
COMMUNITY

## 2022-01-01 RX ORDER — SODIUM CHLORIDE 0.9 % (FLUSH) 0.9 %
10 SYRINGE (ML) INJECTION PRN
Status: DISCONTINUED | OUTPATIENT
Start: 2022-01-01 | End: 2022-01-01 | Stop reason: HOSPADM

## 2022-01-01 RX ORDER — HEPARIN SODIUM 1000 [USP'U]/ML
40 INJECTION, SOLUTION INTRAVENOUS; SUBCUTANEOUS PRN
Status: DISCONTINUED | OUTPATIENT
Start: 2022-01-01 | End: 2022-01-01 | Stop reason: HOSPADM

## 2022-01-01 RX ADMIN — SODIUM CHLORIDE, PRESERVATIVE FREE 10 ML: 5 INJECTION INTRAVENOUS at 15:54

## 2022-01-01 RX ADMIN — IOPAMIDOL 75 ML: 755 INJECTION, SOLUTION INTRAVENOUS at 15:54

## 2022-01-01 RX ADMIN — HEPARIN SODIUM 7260 UNITS: 1000 INJECTION, SOLUTION INTRAVENOUS; SUBCUTANEOUS at 17:58

## 2022-01-01 RX ADMIN — HEPARIN SODIUM 18 UNITS/KG/HR: 10000 INJECTION, SOLUTION INTRAVENOUS at 18:03

## 2022-01-01 RX ADMIN — SODIUM CHLORIDE 80 ML: 9 INJECTION, SOLUTION INTRAVENOUS at 15:54

## 2022-01-01 RX ADMIN — LORAZEPAM 1 MG: 2 INJECTION INTRAMUSCULAR at 23:27

## 2022-01-01 RX ADMIN — DIVALPROEX SODIUM 125 MG: 125 CAPSULE, COATED PELLETS ORAL at 23:23

## 2022-01-01 RX ADMIN — SODIUM CHLORIDE 500 ML: 9 INJECTION, SOLUTION INTRAVENOUS at 13:17

## 2022-01-01 RX ADMIN — SODIUM CHLORIDE, PRESERVATIVE FREE 10 ML: 5 INJECTION INTRAVENOUS at 23:29

## 2022-01-01 RX ADMIN — OXCARBAZEPINE 300 MG: 300 TABLET, FILM COATED ORAL at 23:23

## 2022-01-01 RX ADMIN — CEFTRIAXONE SODIUM 1000 MG: 1 INJECTION, POWDER, FOR SOLUTION INTRAMUSCULAR; INTRAVENOUS at 19:42

## 2022-01-01 ASSESSMENT — PAIN - FUNCTIONAL ASSESSMENT: PAIN_FUNCTIONAL_ASSESSMENT: NONE - DENIES PAIN

## 2022-01-01 ASSESSMENT — PAIN SCALES - GENERAL
PAINLEVEL_OUTOF10: 5
PAINLEVEL_OUTOF10: 0

## 2022-09-22 PROBLEM — R41.82 ALTERED MENTAL STATE: Status: ACTIVE | Noted: 2022-01-01

## 2022-09-22 NOTE — ED PROVIDER NOTES
EMERGENCY DEPARTMENT ENCOUNTER    Pt Name: Valeri Daley  MRN: [id number]  [place] 1950  Date of evaluation: 9/22/22  CHIEF COMPLAINT       Chief Complaint   Patient presents with    Seizures     HISTORY OF PRESENT ILLNESS   68-year-old female presenting to the ER after a suspected seizure event. Patient was found in the room foaming at the mouth by the nursing staff at the facility she is coming from. Patient with an underlying history of schizophrenia as well as dementia. Per the chart, the patient does have an underlying history of seizures and is on Trileptal.    The history is provided by the EMS personnel. Seizures  Seizure activity on arrival: no          REVIEW OF SYSTEMS     Review of Systems   Unable to perform ROS: Mental status change   Neurological:  Positive for seizures. PASTMEDICAL HISTORY     Past Medical History:   Diagnosis Date    Bowel obstruction ([facility]     ? COPD (chronic obstructive pulmonary disease) ([facility])     Fecal incontinence     Gallstones     Hyperlipidemia     Hyperplastic polyp of intestine     Hypertension     Psychiatric problem     depression    Seizures ([facility])      Past Problem List  Patient Active Problem List   Diagnosis Code    History of gallstones Z87.19    COPD (chronic obstructive pulmonary disease) ([facility] J44.9    Peptic ulcer K27.9    Seizures ([facility]) R56.9    HTN (hypertension) I10    Gall bladder stones K80.20    URI (upper respiratory infection) J06.9    Gall stones K80.20    Fall due to wet surface W01. 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    Altered mental state R41.82     SURGICAL HISTORY       Past Surgical History:   Procedure Laterality Date COLONOSCOPY  2017    HYPERPLASTIC POLYP    HYSTERECTOMY      [place] COLSC FLX W/REMOVAL LESION BY HOT BX FORCEPS N/A 5/3/2017    COLONOSCOPY POLYPECTOMY HOT BIOPSY, HOT SNARE POLYPECTOMY performed by Nidhi Villatoro MD at [address] [place] [address]       Previous Medications    ACETAMINOPHEN (TYLENOL) 325 MG TABLET    Take 650 mg by mouth every 6 hours as needed for Pain    ALBUTEROL SULFATE HFA (VENTOLIN HFA) 108 (90 BASE) MCG/ACT INHALER    Inhale 2 puffs into the lungs every 8 hours as needed for Wheezing or Shortness of Breath    DIVALPROEX (DEPAKOTE) 125 MG DR TABLET    Take 125 mg by mouth 3 times daily    DONEPEZIL (ARICEPT) 10 MG TABLET    Take 10 mg by mouth nightly    HYDROCODONE-ACETAMINOPHEN (NORCO) 5-325 MG PER TABLET    Take 1 tablet by mouth every 4 hours as needed for Pain. MELATONIN 5 MG TABS TABLET    Take 5 mg by mouth nightly    MIRTAZAPINE (REMERON) 15 MG TABLET    Take 7.5 mg by mouth nightly    OXCARBAZEPINE (TRILEPTAL) 300 MG TABLET    Take 300 mg by mouth 2 times daily    SERTRALINE (ZOLOFT) 50 MG TABLET    Take 50 mg by mouth daily     ALLERGIES     is allergic to pcn [penicillins]. FAMILY HISTORY     She indicated that her mother is . She indicated that her father is . SOCIAL HISTORY       Social History     Tobacco Use    Smoking status: Former     Packs/day: 1.00     Years: 41.00     Pack years: 41.00     Types: Cigarettes    Smokeless tobacco: Never    Tobacco comments:     smokes cigars   Substance Use Topics    Alcohol use: No    Drug use: No     PHYSICAL EXAM     INITIAL VITALS: BP (!) 93/48   Pulse (!) 106   Temp 97.8 °F (36.6 °C) (Axillary)   Resp 21   Ht 5' 9\" (1.753 m)   Wt 200 lb (90.7 kg)   SpO2 94%   BMI 29.53 kg/m²    Physical Exam  Vitals reviewed. Constitutional:       General: She is not in acute distress. Appearance: Normal appearance. She is not ill-appearing or toxic-appearing. HENT:      Head: Normocephalic and atraumatic. Right Ear: External ear normal.      Left Ear: External ear normal.      Nose: No congestion or rhinorrhea. Eyes:      Extraocular Movements: Extraocular movements intact. Pupils: Pupils are equal, round, and reactive to light. Cardiovascular:      Rate and Rhythm: Regular rhythm. Tachycardia present. Pulses: Normal pulses. Heart sounds: Normal heart sounds. Pulmonary:      Effort: Pulmonary effort is normal. No respiratory distress. Breath sounds: Normal breath sounds. No wheezing. Abdominal:      General: Bowel sounds are normal. There is no distension. Palpations: Abdomen is soft. Tenderness: There is no abdominal tenderness. Musculoskeletal:         General: No deformity or signs of injury. Normal range of motion. Cervical back: No rigidity or tenderness. Skin:     General: Skin is warm and dry. Neurological:      Mental Status: She is unresponsive. GCS: GCS eye subscore is 4. GCS verbal subscore is 1. GCS motor subscore is 6. Psychiatric:         Mood and Affect: Mood normal.         Behavior: Behavior normal.       MEDICAL DECISION MAKING:   Patient with a change in mental state that is likely secondary to a seizure event at her facility that she is coming from. Patient does have an underlying history of seizures. A Trileptal level was ordered in the ER and is pending. Cardiac work-up in the ER did not display positive signs of acute cardiac ischemia. EKG was reviewed and interpreted by myself, ED physician. CT of the head did not display signs of acute abnormality. CT of the chest did display signs of a pulmonary embolus without signs of right heart strain. Patient's respiratory status did deteriorate while in the ER and did require BiPAP. Patient is DNR comfort care per EMS as well as the guardian who was spoken with directly in the hospital as well as over the phone.   The patient was admitted after speaking with the admitting team. Patient was started on heparin while in the ER. CRITICAL CARE:       PROCEDURES:    Procedures    DIAGNOSTIC RESULTS   EKG:All EKG's are interpreted by the Emergency Department Physician who either signs or Co-signs this chart in the absence of a cardiologist.        RADIOLOGY:All plain film, CT, MRI, and formal ultrasound images (except ED bedside ultrasound) are read by the radiologist, see reports below, unless otherwisenoted in MDM or here. CT ABDOMEN PELVIS W IV CONTRAST Additional Contrast? None   Final Result   1. Pulmonary embolus in a subsegmental right upper lobe pulmonary artery. No   evidence of right heart strain. 2. Dilation of the main pulmonary artery, suggestive of pulmonary artery   hypertension. 3. Pericardial effusion. 4. Cholelithiasis without cholecystitis. There is also calcification of the   gallbladder wall, consistent with porcelain gallbladder. 5. Prior partial colectomy with large amount of stool in the distal   colon/rectum, suggestive of constipation. 6. Small hiatal hernia. Critical results were called by Dr. Tiffanie Loyd MD to Dr. Feliz Rivas on   9/22/2022 at 17:03. CT CHEST PULMONARY EMBOLISM W CONTRAST   Final Result   1. Pulmonary embolus in a subsegmental right upper lobe pulmonary artery. No   evidence of right heart strain. 2. Dilation of the main pulmonary artery, suggestive of pulmonary artery   hypertension. 3. Pericardial effusion. 4. Cholelithiasis without cholecystitis. There is also calcification of the   gallbladder wall, consistent with porcelain gallbladder. 5. Prior partial colectomy with large amount of stool in the distal   colon/rectum, suggestive of constipation. 6. Small hiatal hernia. Critical results were called by Dr. Tiffanie Loyd MD to Dr. Feliz Rivas on   9/22/2022 at 17:03. CT HEAD WO CONTRAST   Final Result   No acute intracranial abnormality.   Age-appropriate atrophy and small-vessel   disease ischemic changes. XR CHEST PORTABLE   Final Result   No acute cardiopulmonary disease. LABS: All lab results were reviewed by myself, and all abnormals are listed below.   Labs Reviewed   TROPONIN - Abnormal; Notable for the following components:       Result Value    Troponin, High Sensitivity 36 (*)     All other components within normal limits   TROPONIN - Abnormal; Notable for the following components:    Troponin, High Sensitivity 45 (*)     All other components within normal limits   BASIC METABOLIC PANEL - Abnormal; Notable for the following components:    Glucose 112 (*)     Sodium 134 (*)     Potassium 3.5 (*)     Chloride 94 (*)     CO2 17 (*)     Anion Gap 23 (*)     All other components within normal limits   CBC WITH AUTO DIFFERENTIAL - Abnormal; Notable for the following components:    WBC 13.5 (*)     RDW 17.0 (*)     Seg Neutrophils 74 (*)     Lymphocytes 20 (*)     Segs Absolute 10.20 (*)     All other components within normal limits   URINALYSIS WITH REFLEX TO CULTURE - Abnormal; Notable for the following components:    Color, UA Dark Yellow (*)     Turbidity UA Turbid (*)     Ketones, Urine SMALL (*)     Protein, UA 1+ (*)     Leukocyte Esterase, Urine LARGE (*)     All other components within normal limits   HEPATIC FUNCTION PANEL - Abnormal; Notable for the following components:    Albumin 3.0 (*)     AST 41 (*)     Total Protein 6.2 (*)     All other components within normal limits   BRAIN NATRIURETIC PEPTIDE - Abnormal; Notable for the following components:    Pro- (*)     All other components within normal limits   BLOOD GAS, VENOUS - Abnormal; Notable for the following components:    pH, Kenneth 7.232 (*)     HCO3, Venous 16.9 (*)     Negative Base Excess, Kenneth 10.6 (*)     All other components within normal limits   MICROSCOPIC URINALYSIS - Abnormal; Notable for the following components:    Amorphous, UA 4+ (*)     All other components within normal limits   CULTURE, URINE   APTT   PROTIME-INR   PHOSPHORUS   MAGNESIUM   LIPASE   OXCARBAZEPINE LEVEL   ANTI-XA, UNFRACTIONATED HEPARIN   ANTI-XA, UNFRACTIONATED HEPARIN       EMERGENCY DEPARTMENTCOURSE:         Vitals:    Vitals:    09/22/22 1726 09/22/22 1741 09/22/22 1756 09/22/22 1840   BP: (!) 136/108 (!) 158/134 (!) 136/120 (!) 93/48   Pulse: (!) 108 (!) 117 (!) 108 (!) 106   Resp: 23 23 19 21   Temp:       TempSrc:       SpO2:       Weight:       Height:           The patient was given the following medications while in the emergency department:  Orders Placed This Encounter   Medications    0.9 % sodium chloride bolus    0.9 % sodium chloride bolus    sodium chloride flush 0.9 % injection 10 mL    iopamidol (ISOVUE-370) 76 % injection 75 mL    heparin (porcine) injection 7,260 Units    heparin (porcine) injection 7,260 Units    heparin (porcine) injection 3,630 Units    heparin 25,000 units in dextrose 5% 250 mL (premix) infusion    sodium chloride flush 0.9 % injection 5-40 mL    sodium chloride flush 0.9 % injection 5-40 mL    0.9 % sodium chloride infusion    acetaminophen (TYLENOL) tablet 650 mg    OR Linked Order Group     ondansetron (ZOFRAN-ODT) disintegrating tablet 4 mg     ondansetron (ZOFRAN) injection 4 mg     CONSULTS:  IP CONSULT TO INTERNAL MEDICINE    FINAL IMPRESSION      1. Other acute pulmonary embolism without acute cor pulmonale ([facility]    2. Seizure ([facility]          DISPOSITION/PLAN   DISPOSITION Admitted 09/22/2022 05:51:19 PM      PATIENT REFERRED TO:  No follow-up provider specified. DISCHARGE MEDICATIONS:  New Prescriptions    No medications on file     The care is provided during an unprecedented national emergency due to the novel coronavirus, COVID 19.   DO Billy Pereira DO  09/22/22 3673

## 2022-09-22 NOTE — PROGRESS NOTES
PROVIDE ADEQUATE OXYGENATION WITH ACCEPTABLE SP02/ABG'S    [x]  IDENTIFY APPROPRIATE OXYGEN THERAPY  [x]   MONITOR SP02/ABG'S AS NEEDED   [x]   PATIENT EDUCATION AS NEEDED   BRONCHOSPASM/BRONCHOCONSTRICTION     []         IMPROVE AERATION/BREATH SOUNDS  []   ADMINISTER BRONCHODILATOR THERAPY AS APPROPRIATE  []   ASSESS BREATH SOUNDS  []   IMPLEMENT AEROSOL/MDI PROTOCOL  []   PATIENT EDUCATION AS NEEDED   NONINVASIVE VENTILATION    PROVIDE OPTIMAL VENTILATION/ACCEPTABLE SPO2   IMPLEMENT NONINVASIVE VENTILATION PROTOCOL   MAINTAIN ACCEPTABLE SPO2   ASSESS SKIN INTEGRITY/BREAKDOWN SCORE   PATIENT EDUCATION AS NEEDED   BIPAP AS NEEDED

## 2022-09-22 NOTE — PROGRESS NOTES
Medication History completed:    New medications: sertraline, mirtazapine, melatonin, Norco, donepezil, divalproex DR, albuterol, acetaminophen    Medications discontinued: sodium chloride, hydralazine    Changes to dosing: none    Stated allergies: As listed    Other pertinent information: Medications confirmed with facility list ([place] [address]).      Thank you,  Birgit Garcia, PharmD, BCPS  [phone]

## 2022-09-22 NOTE — ED NOTES
Patient's SPO2 at 73% room air. Mary RN applied oxygen via nasal cannula at 4 liters. Patient's SPO2 increased to 77%. Dr. Yifan Aguirre notified. Respiratory paged.       Iman Suggs RN  09/22/22 1195

## 2022-09-22 NOTE — PROGRESS NOTES
Patient placed on BiPAP 16/8, 100%, backup RR 16. SpO2 87-90%. RR 27. Patient tolerating fair at this time.

## 2022-09-23 PROBLEM — I27.20 PULMONARY HYPERTENSION (HCC): Status: ACTIVE | Noted: 2022-01-01

## 2022-09-23 PROBLEM — I26.94 MULTIPLE SUBSEGMENTAL PULMONARY EMBOLI WITHOUT ACUTE COR PULMONALE (HCC): Status: ACTIVE | Noted: 2022-01-01

## 2022-09-23 PROBLEM — N30.01 ACUTE CYSTITIS WITH HEMATURIA: Status: ACTIVE | Noted: 2022-01-01

## 2022-09-23 PROBLEM — G93.40 ENCEPHALOPATHY: Status: ACTIVE | Noted: 2022-01-01

## 2022-09-23 PROBLEM — G40.909 SEIZURE DISORDER (HCC): Status: ACTIVE | Noted: 2022-01-01

## 2022-09-23 NOTE — PROGRESS NOTES
RN unable to complete admission navigator, pt on BiPAP and has incomprehensible speech. Pt restless,unable to get a steady BP and Spo2 as well. RN liased with charge RN and house supervisor notified. Pt is DNRCC.  RN to update MD

## 2022-09-23 NOTE — PROGRESS NOTES
Writer spoke with Dr. Sara Spears at nursing station. Writer informed Dr. Sara Spears about RT suggestion of non-rebreather. Per Dr. Sara Spears, refer to pulmonology regarding non-rebreather.

## 2022-09-23 NOTE — PROGRESS NOTES
Writer assessing patient alongside student nurse. Patient not responding at this time. Patient not responsive to painful stimuli either. Writer unable to administer medications as prescribed.

## 2022-09-23 NOTE — H&P
[address]                 [address] [facility], [address]                              HISTORY AND PHYSICAL    PATIENT NAME: Oscar Gilbert                         :        1950  MED REC NO:   [id number]                              ROOM:       4  ACCOUNT NO:   [de-identified]                           ADMIT DATE: 2022  PROVIDER:     Alejandro Alvarado    CHIEF COMPLAINT:  Probable seizure. HISTORY OF PRESENT ILLNESS:  A 66year-old [facility] patient was found in her  room by nursing staff, foaming at the mouth and was unresponsive. As a  result, she was sent to the hospital.    PAST MEDICAL HISTORY:  Includes COPD, peptic ulcer, seizures,  hypertension, urinary tract infection recurrent, altered mental status. MEDICATIONS:  Medication list includes the following:  Tylenol,  albuterol HFA, Depakote, Aricept, Norco, melatonin, mirtazapine,  Trileptal, and sertraline. SOCIAL HISTORY:  Former smoker, 41 pack-year cigarette smoker history. REVIEW OF SYSTEMS:  No review of systems was possible because of the  patient's encephalopathic presentation. PHYSICAL EXAMINATION:  VITAL SIGNS:  Blood pressure 90/50, pulse 98, respirations 28,  temperature 97.8, latest O2 sat 93% on BiPap. CONSTITUTIONAL:  The patient is unresponsive to all stimuli including  pain. She shows no evidence of retraction to painful stimuli either  preglabellar or pretibial.  NECK:  Supple without JVD. HEART:  RSR.  LUNGS:  Clear to auscultation anteriorly. ABDOMEN:  Soft, nontender. EXTREMITIES:  Non-edematous. NEUROPSYCHIATRIC:  The patient is unresponsive to all stimuli. Shows no  Babinski reactivity at all. LABORATORY TESTING:  Her proBNP is elevated at 852. Urine is dark with  large leukocyte esterase, small blood, 6 to 9 white blood cells, 6 to 9  red blood cells.     IMAGING DATA:  CAT scan of her head shows no intracranial abnormality  with age-appropriate atrophy and small-vessel ischemic changes. CTA of  the chest shows pulmonary emboli in the subsegmental right upper lobe,  dilation of the main pulmonary artery suggestive of pulmonary  hypertension, pericardial effusion, cholelithiasis. DIAGNOSTIC IMPRESSION:  Includes:  1. Generalized seizure disorder. 2.  Pulmonary emboli. 3.  Urinary tract infection. 4.  Encephalopathy.         Eleni Speaker    D: 09/23/2022 9:22:43       T: 09/23/2022 9:28:47     [email or web address]  Job#: [id number]     Doc#: [id number]    CC:

## 2022-09-23 NOTE — PROGRESS NOTES
[facility]     Patient Death Note                                      DEATH                Room # 9458/8379-48   Name: Elena Mustafa            Age: 66 y.o. Gender: female          [employer]: [employer]     Admit Date & Time: 2022 12:32 PM     Referral: Palliative Care- Esmer Elizabeth    Actual date of death: 2022   TOD: 6052      SITUATION AT DEATH:  Patient is a 66 y.o. female This is not a 's case. SPIRITUAL/EMOTIONAL INTERVENTION:  No family present. Pt has legal guardian - Abimael Schofield. Spoke with Abimael Schofield over the phone along with Nurse Manager Jessica Jeronimo and completed  ARTIE form. Prayed over pt       Family Received Grief Packet?   NO     HOME:  Name: [facility]: [place], [place]  Phone Number: [phone]    Legal Guardian:  Name: Abimael Schofield  Relationship: Legal Guardian  Street Address: [address] [place] [address]  Zip code: [address]  Phone Number: [phone]    Marrian Hearing  2022 10:38 AM         22 1036   Encounter Summary   Encounter Overview/Reason  Spiritual/Emotional Needs;Grief, Loss, and Adjustments;Palliative Care   Service Provided For: Patient   Referral/Consult From: Palliative Care   Last Encounter  22   Complexity of Encounter Moderate   Begin Time 1000   End Time  1037   Total Time Calculated 37 min   Grief, Loss, and Adjustments   Type Death   Assessment/Intervention/Outcome   Intervention Prayer (assurance of)/[place]

## 2022-09-23 NOTE — PLAN OF CARE
Problem: Discharge Planning  Goal: Discharge to home or other facility with appropriate resources  Outcome: Progressing  Flowsheets (Taken 9/23/2022 0409)  Discharge to home or other facility with appropriate resources: Identify barriers to discharge with patient and caregiver     Problem: Pain  Goal: Verbalizes/displays adequate comfort level or baseline comfort level  Outcome: Progressing  Flowsheets (Taken 9/23/2022 0409)  Verbalizes/displays adequate comfort level or baseline comfort level:   Encourage patient to monitor pain and request assistance   Assess pain using appropriate pain scale   Administer analgesics based on type and severity of pain and evaluate response   Implement non-pharmacological measures as appropriate and evaluate response     Problem: Safety - Adult  Goal: Free from fall injury  Outcome: Progressing  Flowsheets (Taken 9/23/2022 0409)  Free From Fall Injury: Instruct family/caregiver on patient safety     Problem: Neurosensory - Adult  Goal: Achieves stable or improved neurological status  Outcome: Progressing  Flowsheets (Taken 9/23/2022 0409)  Achieves stable or improved neurological status: Assess for and report changes in neurological status  Goal: Absence of seizures  Outcome: Progressing  Flowsheets (Taken 9/23/2022 0409)  Absence of seizures: Monitor for seizure activity.   If seizure occurs, document type and location of movements and any associated apnea     Problem: Respiratory - Adult  Goal: Achieves optimal ventilation and oxygenation  Outcome: Progressing  Flowsheets (Taken 9/23/2022 0409)  Achieves optimal ventilation and oxygenation:   Assess for changes in respiratory status   Assess for changes in mentation and behavior     Problem: Cardiovascular - Adult  Goal: Maintains optimal cardiac output and hemodynamic stability  Outcome: Progressing  Flowsheets (Taken 9/23/2022 0409)  Maintains optimal cardiac output and hemodynamic stability: Monitor blood pressure and heart rate     Problem: Skin/Tissue Integrity - Adult  Goal: Skin integrity remains intact  Outcome: Progressing  Flowsheets (Taken 9/23/2022 0409)  Skin Integrity Remains Intact: Monitor for areas of redness and/or skin breakdown     Problem: Musculoskeletal - Adult  Goal: Return mobility to safest level of function  Outcome: Progressing  Flowsheets (Taken 9/23/2022 0409)  Return Mobility to Safest Level of Function: Assess patient stability and activity tolerance for standing, transferring and ambulating with or without assistive devices     Problem: Infection - Adult  Goal: Absence of infection during hospitalization  Outcome: Progressing  Flowsheets (Taken 9/23/2022 0409)  Absence of infection during hospitalization:   Assess and monitor for signs and symptoms of infection   Monitor lab/diagnostic results     Problem: Metabolic/Fluid and Electrolytes - Adult  Goal: Electrolytes maintained within normal limits  Outcome: Progressing  Flowsheets (Taken 9/23/2022 0409)  Electrolytes maintained within normal limits: Monitor labs and assess patient for signs and symptoms of electrolyte imbalances

## 2022-09-23 NOTE — CONSULTS
..  Palliative Care Inpatient Consult    NAME:  Valeri Daley  MEDICAL RECORD NUMBER:  [id number]  AGE: 66 y.o. GENDER: female  : 1950  TODAY'S DATE:  2022    Reasons for Consultation:    Symptom and/or pain management  Provision of information regarding PC and/or hospice philosophies  Complex, time-intensive communication and interdisciplinary psychosocial support  Clarification of goals of care and/or assistance with difficult decision-making  Guidance in regards to resources and transition(s)    Members of PC team contributing to this consultation are : Jessica Doshi, Palliative Care APRN-CNP    Plan      Palliative Interaction: I went to see patient, and received update from RN. Patient is cold and has mottling to BLE. Heart tones are distant. Nursing instructor was able to get some pulses on doppler. Systolic BP is 98/88 per charting. I called patients legal guardian and updated on patients hospitalized problems. We discussed present code status and current treatment including heparin drip, Depakote, and Bipap. We also discussed that patient became unresponsive this AM and requiring Bipap 60% Fi02. We discussed patients extremities are cold and she has mottling present. We discussed how she is actively dying. Guardian placed writer on 3-way call with Linda Jones and we discussed the above. I discussed current code status and confirmed it with both. I asked about adding hospice conversion and making patient more comfortable off Bipap. Both are agreeable to this. I asked about family or spiritual needs and family has been court ordered to not be around.  called to update and request prayer. I placed comfort meds and updated RN that we can transition patient off Bipap with these to make her more comfortable. RN reports HR is declining into 50's now.       Education/support to staff  Communications with primary service  Discussing meaning/purpose   Specific spiritual beliefs/practices  Continue with current plan of care  Clarification of medical condition to patient and family  Code status clarified: [facility]  Palliative care orders introduced  Provided information about hospice  Principle Problem/Diagnosis:  Altered mental state    Additional Assessments:   Principal Problem:    Altered mental state  Active Problems:    Encephalopathy    Multiple subsegmental pulmonary emboli without acute cor pulmonale (HCC)    Seizure disorder ([facility] [address]    Acute cystitis with hematuria    Pulmonary hypertension ([facility] [address]  Resolved Problems:    * No resolved hospital problems. *    1- Symptom management/ pain control     Pain Assessment:  The patient is not having any pain. Anxiety:  none                          Dyspnea:  acute dyspnea                          Fatigue:  none    Other: We feel the patient symptoms are being controlled. her current regimen is reviewed by myself and discussed with the staff. 2- Goals of care evaluation   The patient goals of care are provide comfort care/support/palliation/relieve suffering and achievement of a peaceful death   Goals of care discussed with:    [] Patient independently    [] Patient and Family    [x] Family or Healthcare DPOA independently    [] Unable to discuss with patient, family/DPOA not present    3- Code Status  DNR-CC    4- Other recommendations   - We will continue to provide comfort and support to the patient and the family  Please call with any palliative questions or concerns. Palliative Care Team is available via perfect serve or via phone. Palliative Care will continue to follow Ms. Rios's care as needed. History of Present Illness     The patient is a 66 y.o.   Non- / non  female who presents with Seizures      Referred to Palliative Care by   [x] Physician   [] Nursing  [] Family Request   [] Other:       She was admitted to the primary service for Seizure [facility]) [R56.9]  Altered mental state Diagnosis Date    Bowel obstruction ([facility] [address]     ? COPD (chronic obstructive pulmonary disease) (HCC)     Fecal incontinence     Gallstones     Hyperlipidemia     Hyperplastic polyp of intestine     Hypertension     Psychiatric problem     depression    Seizures ([facility]        PAST SURGICAL HISTORY  Past Surgical History:   Procedure Laterality Date    COLONOSCOPY  05/03/2017    HYPERPLASTIC POLYP    HYSTERECTOMY      RI COLSC FLX W/REMOVAL LESION BY HOT BX FORCEPS N/A 5/3/2017    COLONOSCOPY POLYPECTOMY HOT BIOPSY, HOT SNARE POLYPECTOMY performed by Jeanetta Pallas, MD at [address] [place] [address]  Social History     Tobacco Use    Smoking status: Former     Packs/day: 1.00     Years: 41.00     Pack years: 41.00     Types: Cigarettes    Smokeless tobacco: Never    Tobacco comments:     smokes cigars   Substance Use Topics    Alcohol use: No    Drug use: No       FAMILY HISTORY  . .No family history on file. ALLERGIES  Allergies   Allergen Reactions    Pcn [Penicillins]      Unsure of reaction         MEDICATIONS  Current Medications    sodium chloride flush  5-40 mL IntraVENous 2 times per day    divalproex  125 mg Oral TID    OXcarbazepine  300 mg Oral BID     sodium chloride flush, heparin (porcine), heparin (porcine), sodium chloride flush, sodium chloride, acetaminophen, ondansetron **OR** ondansetron, LORazepam  IV Drips/Infusions   heparin (PORCINE) Infusion 15 Units/kg/hr (09/23/22 0140)    sodium chloride       Home Medications  No current facility-administered medications on file prior to encounter.      Current Outpatient Medications on File Prior to Encounter   Medication Sig Dispense Refill    albuterol sulfate HFA (VENTOLIN HFA) 108 (90 Base) MCG/ACT inhaler Inhale 2 puffs into the lungs every 8 hours as needed for Wheezing or Shortness of Breath      donepezil (ARICEPT) 10 MG tablet Take 10 mg by mouth nightly      divalproex (DEPAKOTE) 125 MG DR tablet Take 125 mg by mouth 3 times daily HYDROcodone-acetaminophen (NORCO) 5-325 MG per tablet Take 1 tablet by mouth every 4 hours as needed for Pain.      melatonin 5 MG TABS tablet Take 5 mg by mouth nightly      mirtazapine (REMERON) 15 MG tablet Take 7.5 mg by mouth nightly      acetaminophen (TYLENOL) 325 MG tablet Take 650 mg by mouth every 6 hours as needed for Pain      sertraline (ZOLOFT) 50 MG tablet Take 50 mg by mouth daily      OXcarbazepine (TRILEPTAL) 300 MG tablet Take 300 mg by mouth 2 times daily           Data         BP (!) 94/42   Pulse (!) 113   Temp 97.3 °F (36.3 °C) (Axillary)   Resp (!) 33   Ht 5' 9\" (1.753 m)   Wt 200 lb (90.7 kg)   SpO2 93%   BMI 29.53 kg/m²     Wt Readings from Last 3 Encounters:   09/22/22 200 lb (90.7 kg)   07/12/21 229 lb 11.5 oz (104.2 kg)   08/04/20 207 lb (93.9 kg)        Lab Results   Component Value Date    WBC 13.5 (H) 09/22/2022    HGB 13.1 09/22/2022    HCT 37.9 09/22/2022    MCV 93.7 09/22/2022     09/22/2022    ,   Lab Results   Component Value Date/Time     09/22/2022 01:18 PM    K 3.5 09/22/2022 01:18 PM    CL 94 09/22/2022 01:18 PM    CO2 17 09/22/2022 01:18 PM    BUN 20 09/22/2022 01:18 PM    CREATININE 0.86 09/22/2022 01:18 PM    GLUCOSE 112 09/22/2022 01:18 PM    GLUCOSE 96 05/04/2012 05:46 PM    CALCIUM 8.9 09/22/2022 01:18 PM    ,   Lab Results   Component Value Date    INR 1.1 09/22/2022    INR 1.0 08/14/2012    PROTIME 14.0 09/22/2022    PROTIME 10.5 08/14/2012   , .   Lab Results   Component Value Date    ALT 30 09/22/2022    AST 41 (H) 09/22/2022    ALKPHOS 62 09/22/2022    BILITOT 0.6 09/22/2022   ,   Lab Results   Component Value Date    CKTOTAL 106 07/25/2013    CKMB 1.6 07/25/2013    TROPONINI 0.01 07/26/2013   ,   Lab Results   Component Value Date/Time    COLORU Dark Yellow 09/22/2022 05:17 PM    NITRU NEGATIVE 09/22/2022 05:17 PM    GLUCOSEU NEGATIVE 09/22/2022 05:17 PM    GLUCOSEU NEGATIVE 05/04/2012 06:23 PM    KETUA SMALL 09/22/2022 05:17 PM    UROBILINOGEN Normal 09/22/2022 05:17 PM    BILIRUBINUR NEGATIVE 09/22/2022 05:17 PM    BILIRUBINUR NEGATIVE 05/04/2012 06:23 PM   , No results found for: AMYLASE,   Lab Results   Component Value Date    LIPASE 23 09/22/2022   , No results found for: DDIMER,   Lab Results   Component Value Date    BNP 59 10/31/2013   , No results found for: CEA, No results found for: , No results found for: AFPAMN,   Lab Results   Component Value Date    LACTA 1.8 04/19/2017   ,     CT Result (most recent):  CT ABDOMEN PELVIS W IV CONTRAST 09/22/2022    Narrative  EXAMINATION:  CTA OF THE CHEST; CT OF THE ABDOMEN AND PELVIS WITH CONTRAST 9/22/2022 3:21  pm; 9/22/2022 3:31 pm    TECHNIQUE:  CTA of the chest was performed after the administration of intravenous  contrast.  Multiplanar reformatted images are provided for review. MIP  images are provided for review. Automated exposure control, iterative  reconstruction, and/or weight based adjustment of the mA/kV was utilized to  reduce the radiation dose to as low as reasonably achievable.; CT of the  abdomen and pelvis was performed with the administration of intravenous  contrast. Multiplanar reformatted images are provided for review. Automated  exposure control, iterative reconstruction, and/or weight based adjustment of  the mA/kV was utilized to reduce the radiation dose to as low as reasonably  achievable. COMPARISON:  07/08/2021. HISTORY:  ORDERING SYSTEM PROVIDED HISTORY: respiratory failure  TECHNOLOGIST PROVIDED HISTORY:  respiratory failure  Decision Support Exception - unselect if not a suspected or confirmed  emergency medical condition->Emergency Medical Condition (MA)  Reason for Exam: respiratory failure  Additional signs and symptoms: found in the room foaming at the mouth.  hx of  dementia and seizures ; ORDERING SYSTEM PROVIDED HISTORY: respiratory failure  TECHNOLOGIST PROVIDED HISTORY:  respiratory failure    Decision Support Exception - unselect if not a suspected or confirmed  emergency medical condition->Emergency Medical Condition (MA)  Additional signs and symptoms: found in the room foaming at the mouth. hx of  dementia and seizures    FINDINGS:  CHEST:    Pulmonary Arteries: Pulmonary arteries are adequately opacified for  evaluation. There is a filling defect in a subsegmental right upper lobe  pulmonary artery. Main pulmonary artery is dilated to 3.5 cm. Mediastinum: No evidence of mediastinal lymphadenopathy. Heart size is  normal with small pericardial effusion. .  There is no acute abnormality of  the thoracic aorta. Coronary artery atherosclerosis. Lungs/pleura: There is no pleural effusion. There is no pneumothorax. There  is no pulmonary consolidation. 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 There is a 5 mm right upper lobe nodule, unchanged. No further  imaging follow-up is required. Soft Tissues/Bones: Chronic deformity of the left humerus. Multilevel  degenerative changes in the thoracic spine. There is compression deformity  at T12, chronic. ABDOMEN/PELVIS:    Organs: Liver is diffusely hypoattenuating. There is cholelithiasis without  pericholecystic fluid. Calcifications are also noted in the gallbladder  wall. No acute abnormality within the spleen, pancreas, or adrenal glands. No nephrolithiasis or hydronephrosis. Bowel: Small hiatal hernia. Stomach is partially distended. The small bowel  is nondilated. There is a large amount of stool in the distal colon/rectum. Prior partial colectomy with anastomosis of the sigmoid colon. The appendix  is normal in caliber. Pelvis: Bladder is partially distended without vesicular stone. Peritoneum: No ascites or pneumoperitoneum. Atherosclerosis in the  nondilated abdominal aorta. Retroaortic left renal vein. Bones/Soft Tissues: There is anterior wedging at L5, chronic. Multilevel  degenerative changes in the lumbar spine. Impression  1.  Pulmonary patient understand diagnosis/treatment? not asked  Does caregiver understand diagnosis/treatment? yes      Assessment        REVIEW OF SYSTEMS  Unable to assess- patient is unresponsive    PHYSICAL ASSESSMENT:  Constitutional: unresponsive- actively dying   Head: Normocephalic and atraumatic. Eyes: Pupils are equal, round   Neck: Neck supple. No tracheal deviation present. Cardiovascular: Normal rate and heart tones distant. Pulmonary: Effort normal and breath sounds normal.+Bipap 60% Fi02  Abdomen: Soft. No tenderness, not distended, no ascites, no organomegaly   Musculoskeletal: +2BLE edema  Neurological: +Unresponsive   Skin: Normal turgor, no bleeding, no bruising +Mottling to BLE up to thighs    Palliative Performance Scale:  ___60%  Ambulation reduced; Significant disease; Can't do hobbies/housework; intake normal or reduced; occasional assist; LOC full/confusion  ___50%  Mainly sit/lie; Extensive disease; Can't do any work; Considerable assist; intake normal or reduced; LOC full/confusion  ___40%  Mainly in bed; Extensive disease; Mainly assist; intake normal or reduced; LOC full/confusion   ___30%  Bed Bound; Extensive disease; Total care; intake reduced; LOCfull/confusion  ___20%  Bed Bound; Extensive disease; Total care; intake minimal; Drowsy/coma  _x__10%  Bed Bound; Extensive disease; Total care; Mouth care only; Drowsy/coma  ___0       Death        Thank you for allowing Palliative Care to participate in the care of Ms. Pooja Enriquez . This note has been dictated by dragon, typing errors may be a possibility. The total time I spent in seeing the patient, discussing goals of care, advanced directives, code status and other major issues was more than 60 minutes      Electronically signed by   ANDRE Gibson - CNP  Palliative Care Team  on 9/23/2022 at 8:56 AM    Palliative Care can be reached via perfect serve.

## 2022-09-25 LAB — OXCARBAZEPINE: 25 UG/ML (ref 3–35)

## 2022-12-05 NOTE — DISCHARGE SUMMARY
[address]                 [address] [facility], [address]                               DISCHARGE SUMMARY    PATIENT NAME: Oscar Gilbert                         :        1950  MED REC NO:   [id number]                              ROOM:       2054  ACCOUNT NO:   [de-identified]                           ADMIT DATE: 2022  PROVIDER:     Natalie Briseno DATE: 2022    PRIMARY DIAGNOSIS:  Generalized seizure disorder. SECONDARY DIAGNOSES:  Include;  1. Pulmonary emboli. 2.  Urinary tract infection. 3.  Encephalopathy. HOSPITAL COURSE:  The patient was admitted through the emergency room  from an [facility] with a history of recent seizure activity. Workup included  an elevated ProBNP of 852 and urine appeared grossly infected. CAT scan  of the head showed no visible recent abnormality. CTA of the chest  showed multiple subsegmental pulmonary emboli, which suggested pulmonary  hypertension. COMORBIDITIES:  Include cholelithiasis and pericardial effusion. Pulmonology was consulted, but before they could see her the patient  went asystolic and was pronounced dead. Her remains were transferred to  a cremation facility. She  under 50 hours natural causes, no  autopsy.         NATALIE RIVAS    D: 2022 10:31:08       T: 2022 3:43:44     [email or web address]  Job#: [id number]     Doc#: [id number]    CC:

## (undated) DEVICE — AIRLIFE™ NASAL OXYGEN CANNULA CURVED, FLARED TIP, WITH 7 FEET (2.1 M) CRUSH RESISTANT TUBING, OVER-THE-EAR STYLE: Brand: AIRLIFE™

## (undated) DEVICE — FORCEPS BX L240CM JAW DIA2.2MM RAD JAW 4 HOT DISP

## (undated) DEVICE — SNARE ENDOSCP L240CM LOOP W27MM SHTH DIA2.4MM WRK CHN 2.8MM

## (undated) DEVICE — POLYP TRAP: Brand: TRAPEASE®

## (undated) DEVICE — SOLUTION IV 1000ML LAC RINGERS PH 6.5 INJ USP VIAFLX PLAS